# Patient Record
Sex: FEMALE | Race: WHITE | NOT HISPANIC OR LATINO | Employment: UNEMPLOYED
[De-identification: names, ages, dates, MRNs, and addresses within clinical notes are randomized per-mention and may not be internally consistent; named-entity substitution may affect disease eponyms.]

---

## 2018-06-18 ENCOUNTER — TRANSCRIBE ORDERS (OUTPATIENT)
Dept: SCHEDULING | Age: 48
End: 2018-06-18

## 2018-06-18 DIAGNOSIS — Z12.31 ENCOUNTER FOR SCREENING MAMMOGRAM FOR MALIGNANT NEOPLASM OF BREAST: Primary | ICD-10-CM

## 2018-06-25 ENCOUNTER — TRANSCRIBE ORDERS (OUTPATIENT)
Dept: RADIOLOGY | Facility: HOSPITAL | Age: 48
End: 2018-06-25

## 2018-06-25 ENCOUNTER — HOSPITAL ENCOUNTER (OUTPATIENT)
Dept: RADIOLOGY | Facility: HOSPITAL | Age: 48
Discharge: HOME | End: 2018-06-25
Attending: OBSTETRICS & GYNECOLOGY
Payer: COMMERCIAL

## 2018-06-25 DIAGNOSIS — Z12.31 ENCOUNTER FOR SCREENING MAMMOGRAM FOR MALIGNANT NEOPLASM OF BREAST: ICD-10-CM

## 2018-06-25 PROCEDURE — 77063 BREAST TOMOSYNTHESIS BI: CPT

## 2018-09-21 PROBLEM — E55.9 VITAMIN D DEFICIENCY: Status: ACTIVE | Noted: 2017-07-18

## 2018-09-21 PROBLEM — O24.419 GESTATIONAL DIABETES MELLITUS (GDM): Status: ACTIVE | Noted: 2017-07-18

## 2018-09-21 RX ORDER — ERYTHROMYCIN 5 MG/G
OINTMENT OPHTHALMIC
COMMUNITY
Start: 2017-02-21 | End: 2018-09-24 | Stop reason: ALTCHOICE

## 2018-09-24 ENCOUNTER — OFFICE VISIT (OUTPATIENT)
Dept: PRIMARY CARE | Facility: CLINIC | Age: 48
End: 2018-09-24
Attending: INTERNAL MEDICINE
Payer: COMMERCIAL

## 2018-09-24 VITALS
SYSTOLIC BLOOD PRESSURE: 120 MMHG | RESPIRATION RATE: 16 BRPM | OXYGEN SATURATION: 97 % | HEIGHT: 64 IN | DIASTOLIC BLOOD PRESSURE: 72 MMHG | HEART RATE: 80 BPM | WEIGHT: 158 LBS | BODY MASS INDEX: 26.98 KG/M2

## 2018-09-24 DIAGNOSIS — R79.82 ELEVATED C-REACTIVE PROTEIN (CRP): ICD-10-CM

## 2018-09-24 DIAGNOSIS — M25.50 ARTHRALGIA, UNSPECIFIED JOINT: ICD-10-CM

## 2018-09-24 DIAGNOSIS — Z12.11 SCREEN FOR COLON CANCER: ICD-10-CM

## 2018-09-24 DIAGNOSIS — O24.419 GESTATIONAL DIABETES MELLITUS (GDM), ANTEPARTUM, GESTATIONAL DIABETES METHOD OF CONTROL UNSPECIFIED: ICD-10-CM

## 2018-09-24 DIAGNOSIS — Z13.220 SCREENING CHOLESTEROL LEVEL: ICD-10-CM

## 2018-09-24 DIAGNOSIS — E55.9 VITAMIN D DEFICIENCY: ICD-10-CM

## 2018-09-24 DIAGNOSIS — Z00.00 PREVENTATIVE HEALTH CARE: Primary | ICD-10-CM

## 2018-09-24 DIAGNOSIS — R53.83 OTHER FATIGUE: ICD-10-CM

## 2018-09-24 PROCEDURE — 99396 PREV VISIT EST AGE 40-64: CPT | Performed by: INTERNAL MEDICINE

## 2018-09-24 ASSESSMENT — ENCOUNTER SYMPTOMS
DIAPHORESIS: 0
DYSURIA: 0
BRUISES/BLEEDS EASILY: 0
SLEEP DISTURBANCE: 0
COUGH: 0
FEVER: 0
CHILLS: 0
TREMORS: 0
BLOOD IN STOOL: 0
ARTHRALGIAS: 1

## 2018-09-24 NOTE — PATIENT INSTRUCTIONS
Problem List Items Addressed This Visit     Gestational diabetes mellitus (GDM)    Relevant Orders    Hemoglobin A1c    Vitamin D deficiency    Relevant Orders    Vitamin D 25 hydroxy    Preventative health care - Primary    Screening cholesterol level    Relevant Orders    Lipid panel    Other fatigue    Relevant Orders    CBC and differential    Comprehensive metabolic panel    Hemoglobin A1c    TSH w reflex FT4    Elevated C-reactive protein (CRP)    Arthralgia    Relevant Orders    Lyme EIA reflex WB    Screen for colon cancer    Relevant Orders    Ambulatory referral to Gastroenterology

## 2018-09-24 NOTE — PROGRESS NOTES
Subjective      Patient ID: Yany Mahoney is a 48 y.o. female.    49 y/o y/o female for annual exam  No acute issues        The following have been reviewed and updated as appropriate in this visit:  Tobacco  Allergies  Problems  Med Hx  Soc Hx      Review of Systems   Constitutional: Negative for chills, diaphoresis and fever.   HENT: Negative for hearing loss.    Eyes: Negative for visual disturbance.   Respiratory: Negative for cough.    Cardiovascular: Negative for chest pain.   Gastrointestinal: Negative for blood in stool.   Endocrine: Negative for polyuria.   Genitourinary: Negative for dysuria.   Musculoskeletal: Positive for arthralgias.   Skin: Negative for rash.   Allergic/Immunologic: Negative for food allergies.   Neurological: Negative for tremors.   Hematological: Does not bruise/bleed easily.   Psychiatric/Behavioral: Negative for sleep disturbance.       Objective     Physical Exam   Constitutional: She is oriented to person, place, and time. She appears well-developed and well-nourished.   HENT:   Head: Normocephalic and atraumatic.   Nose: Nose normal.   Mouth/Throat: Oropharynx is clear and moist.   Eyes: Conjunctivae and EOM are normal. Pupils are equal, round, and reactive to light.   Neck: Normal range of motion. Neck supple.   Cardiovascular: Normal rate, regular rhythm and normal heart sounds.    Pulmonary/Chest: Effort normal and breath sounds normal.   Abdominal: Soft. Bowel sounds are normal.   Musculoskeletal: She exhibits no edema.   Neurological: She is alert and oriented to person, place, and time.   Skin: Skin is warm. Capillary refill takes less than 2 seconds.   Psychiatric: She has a normal mood and affect. Her behavior is normal.   Nursing note and vitals reviewed.      Assessment/Plan   Problem List Items Addressed This Visit     Gestational diabetes mellitus (GDM)    Relevant Orders    Hemoglobin A1c    Vitamin D deficiency    Relevant Orders    Vitamin D 25 hydroxy     Preventative health care - Primary    Screening cholesterol level    Relevant Orders    Lipid panel    Other fatigue    Relevant Orders    CBC and differential    Comprehensive metabolic panel    Hemoglobin A1c    TSH w reflex FT4    Elevated C-reactive protein (CRP)    Arthralgia    Relevant Orders    Lyme EIA reflex WB    Screen for colon cancer    Relevant Orders    Ambulatory referral to Gastroenterology

## 2018-10-03 LAB
25(OH)D3+25(OH)D2 SERPL-MCNC: 43.4 NG/ML (ref 30–100)
ALBUMIN SERPL-MCNC: 4.3 G/DL (ref 3.5–5.5)
ALBUMIN/GLOB SERPL: 1.6 {RATIO} (ref 1.2–2.2)
ALP SERPL-CCNC: 67 IU/L (ref 39–117)
ALT SERPL-CCNC: 16 IU/L (ref 0–32)
AST SERPL-CCNC: 15 IU/L (ref 0–40)
B BURGDOR IGG+IGM SER-ACNC: <0.91 ISR (ref 0–0.9)
B BURGDOR IGM SER IA-ACNC: <0.8 INDEX (ref 0–0.79)
BASOPHILS # BLD AUTO: 0 X10E3/UL (ref 0–0.2)
BASOPHILS NFR BLD AUTO: 0 %
BILIRUB SERPL-MCNC: 0.5 MG/DL (ref 0–1.2)
BUN SERPL-MCNC: 12 MG/DL (ref 6–24)
BUN/CREAT SERPL: 15 (ref 9–23)
CALCIUM SERPL-MCNC: 8.8 MG/DL (ref 8.7–10.2)
CHLORIDE SERPL-SCNC: 103 MMOL/L (ref 96–106)
CHOLEST SERPL-MCNC: 186 MG/DL (ref 100–199)
CO2 SERPL-SCNC: 23 MMOL/L (ref 20–29)
CREAT SERPL-MCNC: 0.82 MG/DL (ref 0.57–1)
CRP SERPL-MCNC: 3.2 MG/L (ref 0–4.9)
EOSINOPHIL # BLD AUTO: 0.1 X10E3/UL (ref 0–0.4)
EOSINOPHIL NFR BLD AUTO: 1 %
ERYTHROCYTE [DISTWIDTH] IN BLOOD BY AUTOMATED COUNT: 13.6 % (ref 12.3–15.4)
GLOBULIN SER CALC-MCNC: 2.7 G/DL (ref 1.5–4.5)
GLUCOSE SERPL-MCNC: 101 MG/DL (ref 65–99)
HBA1C MFR BLD: 5 % (ref 4.8–5.6)
HCT VFR BLD AUTO: 42.4 % (ref 34–46.6)
HDLC SERPL-MCNC: 92 MG/DL
HGB BLD-MCNC: 14.4 G/DL (ref 11.1–15.9)
IMM GRANULOCYTES # BLD: 0 X10E3/UL (ref 0–0.1)
IMM GRANULOCYTES NFR BLD: 0 %
LAB CORP EGFR IF AFRICN AM: 96 ML/MIN/1.73
LAB CORP EGFR IF NONAFRICN AM: 84 ML/MIN/1.73
LDLC SERPL CALC-MCNC: 83 MG/DL (ref 0–99)
LYMPHOCYTES # BLD AUTO: 2.3 X10E3/UL (ref 0.7–3.1)
LYMPHOCYTES NFR BLD AUTO: 36 %
MCH RBC QN AUTO: 29.4 PG (ref 26.6–33)
MCHC RBC AUTO-ENTMCNC: 34 G/DL (ref 31.5–35.7)
MCV RBC AUTO: 87 FL (ref 79–97)
MONOCYTES # BLD AUTO: 0.3 X10E3/UL (ref 0.1–0.9)
MONOCYTES NFR BLD AUTO: 5 %
NEUTROPHILS # BLD AUTO: 3.7 X10E3/UL (ref 1.4–7)
NEUTROPHILS NFR BLD AUTO: 58 %
PLATELET # BLD AUTO: 304 X10E3/UL (ref 150–379)
POTASSIUM SERPL-SCNC: 4.1 MMOL/L (ref 3.5–5.2)
PROT SERPL-MCNC: 7 G/DL (ref 6–8.5)
RBC # BLD AUTO: 4.9 X10E6/UL (ref 3.77–5.28)
SODIUM SERPL-SCNC: 141 MMOL/L (ref 134–144)
T4 FREE SERPL-MCNC: 1.37 NG/DL (ref 0.82–1.77)
TRIGL SERPL-MCNC: 56 MG/DL (ref 0–149)
TSH SERPL DL<=0.005 MIU/L-ACNC: 1.53 UIU/ML (ref 0.45–4.5)
VLDLC SERPL CALC-MCNC: 11 MG/DL (ref 5–40)
WBC # BLD AUTO: 6.4 X10E3/UL (ref 3.4–10.8)

## 2018-10-05 ENCOUNTER — OFFICE VISIT (OUTPATIENT)
Dept: PRIMARY CARE | Facility: CLINIC | Age: 48
End: 2018-10-05
Payer: COMMERCIAL

## 2018-10-05 VITALS
RESPIRATION RATE: 16 BRPM | OXYGEN SATURATION: 98 % | SYSTOLIC BLOOD PRESSURE: 90 MMHG | DIASTOLIC BLOOD PRESSURE: 50 MMHG | HEART RATE: 88 BPM | TEMPERATURE: 98.7 F

## 2018-10-05 DIAGNOSIS — R30.0 DYSURIA: Primary | ICD-10-CM

## 2018-10-05 LAB
BACTERIA, POC: ABNORMAL
BILIRUBIN, POC: NEGATIVE
BLOOD URINE, POC: NEGATIVE
CLARITY, POC: CLEAR
COLOR, POC: YELLOW
EXPIRATION DATE: ABNORMAL
GLUCOSE URINE, POC: NEGATIVE
KETONES, POC: NEGATIVE
LEUKOCYTE EST, POC: ABNORMAL
Lab: ABNORMAL
NITRITE, POC: NEGATIVE
PH, POC: 7
POCT MANUFACTURER: ABNORMAL
PROTEIN, POC: NEGATIVE
SPECIFIC GRAVITY, POC: 1
UROBILINOGEN, POC: 0.2

## 2018-10-05 PROCEDURE — 99214 OFFICE O/P EST MOD 30 MIN: CPT | Performed by: NURSE PRACTITIONER

## 2018-10-05 PROCEDURE — 81002 URINALYSIS NONAUTO W/O SCOPE: CPT | Performed by: NURSE PRACTITIONER

## 2018-10-05 RX ORDER — MULTIVITAMIN WITH IRON
TABLET ORAL
COMMUNITY

## 2018-10-05 RX ORDER — MULTIVITAMIN
TABLET ORAL
COMMUNITY

## 2018-10-05 RX ORDER — CHOLECALCIFEROL (VITAMIN D3) 25 MCG
TABLET ORAL
COMMUNITY

## 2018-10-05 RX ORDER — TRETINOIN 0.5 MG/G
CREAM TOPICAL
COMMUNITY
Start: 2014-04-09 | End: 2020-06-19 | Stop reason: ALTCHOICE

## 2018-10-05 RX ORDER — CIPROFLOXACIN 250 MG/1
250 TABLET, FILM COATED ORAL 2 TIMES DAILY
Qty: 6 TABLET | Refills: 0 | Status: SHIPPED | OUTPATIENT
Start: 2018-10-05 | End: 2019-09-17 | Stop reason: ALTCHOICE

## 2018-10-05 RX ORDER — TRETINOIN 0.25 MG/G
1 CREAM TOPICAL DAILY
COMMUNITY
Start: 2015-04-15 | End: 2018-10-05 | Stop reason: SDUPTHER

## 2018-10-05 RX ORDER — BIOTIN 10 MG
10 TABLET ORAL DAILY
COMMUNITY

## 2018-10-05 RX ORDER — AA/PROT/LYSINE/METHIO/VIT C/B6 50-12.5 MG
10 TABLET ORAL DAILY
COMMUNITY

## 2018-10-05 RX ORDER — KETOCONAZOLE 20 MG/G
1 CREAM TOPICAL
COMMUNITY
Start: 2015-04-15 | End: 2018-10-05 | Stop reason: ALTCHOICE

## 2018-10-05 ASSESSMENT — ENCOUNTER SYMPTOMS
VOMITING: 0
CARDIOVASCULAR NEGATIVE: 1
NAUSEA: 0
DIZZINESS: 0
DYSURIA: 1
BACK PAIN: 1
RESPIRATORY NEGATIVE: 1
DIFFICULTY URINATING: 1
FEVER: 0
HEADACHES: 0
ABDOMINAL PAIN: 0
SHORTNESS OF BREATH: 0
HEMATURIA: 1

## 2018-10-05 NOTE — ASSESSMENT & PLAN NOTE
Increase water intake to 64 ounces daily    Decrease tea    Fill Cipro Rx if no improvement after increase in fluids    Add AZO uristat if needed for burning or discomfort

## 2018-10-05 NOTE — PATIENT INSTRUCTIONS
Dysuria  Increase water intake to 64 ounces daily    Decrease tea    Fill Cipro Rx if no improvement after increase in fluids    Add AZO uristat if needed for burning or discomfort

## 2018-10-05 NOTE — PROGRESS NOTES
Subjective      Patient ID: Yany Mahoney is a 48 y.o. female.  1970      For the past week the patient can feel a tingling with her first urination of the day. When she wipes she can see a pink tinge. The rest of the day she feels fine. She is due for her menstrual cycle.       UTI    This is a new problem. The current episode started in the past 7 days. The problem occurs intermittently. The problem has been unchanged. There has been no fever. She is sexually active. There is no history of pyelonephritis. Associated symptoms include hematuria. Pertinent negatives include no nausea or vomiting. She has tried nothing for the symptoms. The treatment provided no relief.       The following have been reviewed and updated as appropriate in this visit:       Review of Systems   Constitutional: Negative for fever.   Respiratory: Negative.  Negative for shortness of breath.    Cardiovascular: Negative.  Negative for chest pain.   Gastrointestinal: Negative for abdominal pain, nausea and vomiting.   Genitourinary: Positive for difficulty urinating, dysuria and hematuria.   Musculoskeletal: Positive for back pain (Chronic per patient).   Neurological: Negative for dizziness and headaches.   All other systems reviewed and are negative.      Objective     Vitals:    10/05/18 1506   BP: (!) 90/50   BP Location: Left upper arm   Patient Position: Sitting   Pulse: 88   Resp: 16   Temp: 37.1 °C (98.7 °F)   TempSrc: Oral   SpO2: 98%     There is no height or weight on file to calculate BMI.    Physical Exam   Constitutional: She is oriented to person, place, and time. She appears well-developed and well-nourished.   Cardiovascular: Normal rate, regular rhythm, normal heart sounds and intact distal pulses.    Pulmonary/Chest: Effort normal and breath sounds normal.   Abdominal: Soft. Bowel sounds are normal.   Neurological: She is oriented to person, place, and time.   Psychiatric: She has a normal mood and affect. Her  behavior is normal. Judgment and thought content normal.   Vitals reviewed.      Assessment/Plan   Problem List Items Addressed This Visit     Dysuria - Primary     Increase water intake to 64 ounces daily    Decrease tea    Fill Cipro Rx if no improvement after increase in fluids    Add AZO uristat if needed for burning or discomfort         Relevant Orders    POCT urinalysis dipstick (Completed)          Author Changed to:    Lakeisha MCMULLEN

## 2018-10-08 ENCOUNTER — TELEPHONE (OUTPATIENT)
Dept: PRIMARY CARE | Facility: CLINIC | Age: 48
End: 2018-10-08

## 2018-10-08 NOTE — TELEPHONE ENCOUNTER
Pt requesting a return call to update you on how she is feeling following her visit with you last week. # is [phone]

## 2018-10-08 NOTE — TELEPHONE ENCOUNTER
I phoned pt and discussed symptoms.  She continues with mild burning and discomfort with urination.  I advised her to fill Rx for Cipro and complete taking all pills.

## 2019-05-09 ENCOUNTER — APPOINTMENT (OUTPATIENT)
Dept: URBAN - METROPOLITAN AREA CLINIC 200 | Age: 49
Setting detail: DERMATOLOGY
End: 2019-05-13

## 2019-05-09 DIAGNOSIS — D485 NEOPLASM OF UNCERTAIN BEHAVIOR OF SKIN: ICD-10-CM

## 2019-05-09 DIAGNOSIS — Z80.8 FAMILY HISTORY OF MALIGNANT NEOPLASM OF OTHER ORGANS OR SYSTEMS: ICD-10-CM

## 2019-05-09 DIAGNOSIS — Z87.2 PERSONAL HISTORY OF DISEASES OF THE SKIN AND SUBCUTANEOUS TISSUE: ICD-10-CM

## 2019-05-09 DIAGNOSIS — D22 MELANOCYTIC NEVI: ICD-10-CM

## 2019-05-09 DIAGNOSIS — L57.0 ACTINIC KERATOSIS: ICD-10-CM

## 2019-05-09 DIAGNOSIS — L57.8 OTHER SKIN CHANGES DUE TO CHRONIC EXPOSURE TO NONIONIZING RADIATION: ICD-10-CM

## 2019-05-09 DIAGNOSIS — L91.8 OTHER HYPERTROPHIC DISORDERS OF THE SKIN: ICD-10-CM

## 2019-05-09 PROBLEM — D22.71 MELANOCYTIC NEVI OF RIGHT LOWER LIMB, INCLUDING HIP: Status: ACTIVE | Noted: 2019-05-09

## 2019-05-09 PROBLEM — D48.5 NEOPLASM OF UNCERTAIN BEHAVIOR OF SKIN: Status: ACTIVE | Noted: 2019-05-09

## 2019-05-09 PROCEDURE — OTHER BIOPSY BY SHAVE METHOD: OTHER

## 2019-05-09 PROCEDURE — OTHER MIPS QUALITY: OTHER

## 2019-05-09 PROCEDURE — OTHER OBSERVATION AND MEASURE: OTHER

## 2019-05-09 PROCEDURE — 99202 OFFICE O/P NEW SF 15 MIN: CPT | Mod: 25

## 2019-05-09 PROCEDURE — OTHER COUNSELING: OTHER

## 2019-05-09 PROCEDURE — OTHER BENIGN DESTRUCTION COSMETIC: OTHER

## 2019-05-09 PROCEDURE — OTHER LIQUID NITROGEN: OTHER

## 2019-05-09 PROCEDURE — OTHER OBSERVATION: OTHER

## 2019-05-09 PROCEDURE — 17000 DESTRUCT PREMALG LESION: CPT | Mod: 59

## 2019-05-09 PROCEDURE — OTHER PRESCRIPTION: OTHER

## 2019-05-09 PROCEDURE — 11102 TANGNTL BX SKIN SINGLE LES: CPT

## 2019-05-09 RX ORDER — TRETINOIN 0.5 MG/G
CREAM TOPICAL
Qty: 1 | Refills: 4 | Status: ERX | COMMUNITY
Start: 2019-05-09

## 2019-05-09 ASSESSMENT — LOCATION SIMPLE DESCRIPTION DERM
LOCATION SIMPLE: RIGHT UPPER ARM
LOCATION SIMPLE: RIGHT PRETIBIAL REGION
LOCATION SIMPLE: CHEST
LOCATION SIMPLE: RIGHT UPPER BACK
LOCATION SIMPLE: RIGHT UPPER ARM
LOCATION SIMPLE: LEFT ANTERIOR NECK
LOCATION SIMPLE: RIGHT UPPER BACK
LOCATION SIMPLE: RIGHT GREAT TOE
LOCATION SIMPLE: RIGHT GREAT TOE

## 2019-05-09 ASSESSMENT — LOCATION DETAILED DESCRIPTION DERM
LOCATION DETAILED: LEFT INFERIOR ANTERIOR NECK
LOCATION DETAILED: RIGHT MEDIAL GREAT TOE
LOCATION DETAILED: LEFT MEDIAL SUPERIOR CHEST
LOCATION DETAILED: RIGHT SUPERIOR UPPER BACK
LOCATION DETAILED: RIGHT DISTAL PRETIBIAL REGION
LOCATION DETAILED: RIGHT ANTERIOR DISTAL UPPER ARM
LOCATION DETAILED: RIGHT SUPERIOR UPPER BACK
LOCATION DETAILED: RIGHT MEDIAL GREAT TOE
LOCATION DETAILED: RIGHT ANTERIOR DISTAL UPPER ARM

## 2019-05-09 ASSESSMENT — LOCATION ZONE DERM
LOCATION ZONE: ARM
LOCATION ZONE: LEG
LOCATION ZONE: TRUNK
LOCATION ZONE: NECK
LOCATION ZONE: TRUNK
LOCATION ZONE: TOE
LOCATION ZONE: TOE
LOCATION ZONE: ARM

## 2019-05-09 ASSESSMENT — PAIN INTENSITY VAS: HOW INTENSE IS YOUR PAIN 0 BEING NO PAIN, 10 BEING THE MOST SEVERE PAIN POSSIBLE?: NO PAIN

## 2019-05-09 NOTE — PROCEDURE: BIOPSY BY SHAVE METHOD
Size Of Lesion In Cm: 0.4
Detail Level: Detailed
Electrodesiccation And Curettage Text: The wound bed was treated with electrodesiccation and curettage after the biopsy was performed.
Electrodesiccation Text: The wound bed was treated with electrodesiccation after the biopsy was performed.
Depth Of Biopsy: dermis
Anesthesia Volume In Cc (Will Not Render If 0): 0.5
Additional Anesthesia Volume In Cc (Will Not Render If 0): 0
Hemostasis: Drysol
Curettage Text: The wound bed was treated with curettage after the biopsy was performed.
Cryotherapy Text: The wound bed was treated with cryotherapy after the biopsy was performed.
Wound Care: Aquaphor
Consent: Written consent was obtained and risks were reviewed including but not limited to scarring, infection, bleeding, scabbing, incomplete removal, nerve damage and allergy to anesthesia.
Biopsy Method: Dermablade
Bill For Surgical Tray: no
Silver Nitrate Text: The wound bed was treated with silver nitrate after the biopsy was performed.
Type Of Destruction Used: Curettage
Post-Care Instructions: I reviewed with the patient in detail post-care instructions. Patient is to keep the biopsy site dry overnight, and then apply bacitracin twice daily until healed. Patient may apply hydrogen peroxide soaks to remove any crusting.
Was A Bandage Applied: Yes
Notification Instructions: Patient will be notified of biopsy results. However, patient instructed to call the office if not contacted within 2 weeks.
Dressing: bandage
Billing Type: Third-Party Bill
Biopsy Type: H and E

## 2019-05-09 NOTE — HPI: SKIN LESION
Has Your Skin Lesion Been Treated?: not been treated
Is This A New Presentation, Or A Follow-Up?: Sun Spot
Which Family Member (Optional)?: Sister

## 2019-05-09 NOTE — PROCEDURE: BENIGN DESTRUCTION COSMETIC
Consent: The patient's consent was obtained including but not limited to risks of crusting, scabbing, blistering, scarring, darker or lighter pigmentary change, recurrence, incomplete removal and infection.
Detail Level: Zone
Price (Use Numbers Only, No Special Characters Or $): 25.00
Anesthesia Type: 2% lidocaine with epinephrine
Anesthesia Volume In Cc: 0
Post-Care Instructions: I reviewed with the patient in detail post-care instructions. Patient is to wear sunprotection, and avoid picking at any of the treated lesions. Pt may apply Vaseline to crusted or scabbing areas.

## 2019-06-19 ENCOUNTER — TRANSCRIBE ORDERS (OUTPATIENT)
Dept: SCHEDULING | Age: 49
End: 2019-06-19

## 2019-06-19 DIAGNOSIS — Z12.31 ENCOUNTER FOR SCREENING MAMMOGRAM FOR MALIGNANT NEOPLASM OF BREAST: Primary | ICD-10-CM

## 2019-06-26 ENCOUNTER — HOSPITAL ENCOUNTER (OUTPATIENT)
Dept: RADIOLOGY | Age: 49
Discharge: HOME | End: 2019-06-26
Attending: OBSTETRICS & GYNECOLOGY
Payer: COMMERCIAL

## 2019-06-26 DIAGNOSIS — Z12.31 ENCOUNTER FOR SCREENING MAMMOGRAM FOR MALIGNANT NEOPLASM OF BREAST: ICD-10-CM

## 2019-06-26 PROCEDURE — 77067 SCR MAMMO BI INCL CAD: CPT

## 2019-09-10 ENCOUNTER — ANESTHESIA EVENT (OUTPATIENT)
Dept: ENDOSCOPY | Facility: HOSPITAL | Age: 49
End: 2019-09-10
Payer: COMMERCIAL

## 2019-09-10 ENCOUNTER — ANESTHESIA (OUTPATIENT)
Dept: ENDOSCOPY | Facility: HOSPITAL | Age: 49
End: 2019-09-10
Payer: COMMERCIAL

## 2019-09-10 ENCOUNTER — HOSPITAL ENCOUNTER (OUTPATIENT)
Facility: HOSPITAL | Age: 49
Discharge: HOME | End: 2019-09-10
Attending: INTERNAL MEDICINE | Admitting: INTERNAL MEDICINE
Payer: COMMERCIAL

## 2019-09-10 VITALS
TEMPERATURE: 97.2 F | HEART RATE: 64 BPM | HEIGHT: 64 IN | SYSTOLIC BLOOD PRESSURE: 101 MMHG | BODY MASS INDEX: 23.9 KG/M2 | OXYGEN SATURATION: 99 % | DIASTOLIC BLOOD PRESSURE: 66 MMHG | WEIGHT: 140 LBS | RESPIRATION RATE: 16 BRPM

## 2019-09-10 DIAGNOSIS — Z80.0 FAMILY HISTORY OF COLON CANCER: ICD-10-CM

## 2019-09-10 DIAGNOSIS — Z86.0100 HISTORY OF COLON POLYPS: ICD-10-CM

## 2019-09-10 LAB — B-HCG UR QL: NEGATIVE

## 2019-09-10 PROCEDURE — 81025 URINE PREGNANCY TEST: CPT | Performed by: INTERNAL MEDICINE

## 2019-09-10 PROCEDURE — 37000001 HC ANESTHESIA GENERAL: Performed by: INTERNAL MEDICINE

## 2019-09-10 PROCEDURE — 25000000 HC PHARMACY GENERAL: Performed by: INTERNAL MEDICINE

## 2019-09-10 PROCEDURE — 25800000 HC PHARMACY IV SOLUTIONS: Performed by: INTERNAL MEDICINE

## 2019-09-10 PROCEDURE — 0DBN8ZX EXCISION OF SIGMOID COLON, VIA NATURAL OR ARTIFICIAL OPENING ENDOSCOPIC, DIAGNOSTIC: ICD-10-PCS | Performed by: INTERNAL MEDICINE

## 2019-09-10 PROCEDURE — 63600000 HC DRUGS/DETAIL CODE: Mod: JW | Performed by: INTERNAL MEDICINE

## 2019-09-10 PROCEDURE — 75000011 HC COLONSCOPY BIOPSY: Performed by: INTERNAL MEDICINE

## 2019-09-10 PROCEDURE — 88305 TISSUE EXAM BY PATHOLOGIST: CPT | Performed by: INTERNAL MEDICINE

## 2019-09-10 RX ORDER — SODIUM CHLORIDE 9 MG/ML
INJECTION, SOLUTION INTRAVENOUS CONTINUOUS
Status: DISCONTINUED | OUTPATIENT
Start: 2019-09-10 | End: 2019-09-10 | Stop reason: HOSPADM

## 2019-09-10 RX ORDER — PROPOFOL 200MG/20ML
SYRINGE (ML) INTRAVENOUS AS NEEDED
Status: DISCONTINUED | OUTPATIENT
Start: 2019-09-10 | End: 2019-09-10 | Stop reason: SURG

## 2019-09-10 RX ORDER — LIDOCAINE HYDROCHLORIDE 10 MG/ML
INJECTION, SOLUTION EPIDURAL; INFILTRATION; INTRACAUDAL; PERINEURAL AS NEEDED
Status: DISCONTINUED | OUTPATIENT
Start: 2019-09-10 | End: 2019-09-10 | Stop reason: SURG

## 2019-09-10 RX ADMIN — PROPOFOL 20 MG: 10 INJECTION, EMULSION INTRAVENOUS at 10:09

## 2019-09-10 RX ADMIN — PROPOFOL 50 MG: 10 INJECTION, EMULSION INTRAVENOUS at 10:08

## 2019-09-10 RX ADMIN — SODIUM CHLORIDE: 9 INJECTION, SOLUTION INTRAVENOUS at 08:56

## 2019-09-10 RX ADMIN — SODIUM CHLORIDE: 9 INJECTION, SOLUTION INTRAVENOUS at 10:04

## 2019-09-10 RX ADMIN — PROPOFOL 20 MG: 10 INJECTION, EMULSION INTRAVENOUS at 10:22

## 2019-09-10 RX ADMIN — PROPOFOL 20 MG: 10 INJECTION, EMULSION INTRAVENOUS at 10:07

## 2019-09-10 RX ADMIN — PROPOFOL 20 MG: 10 INJECTION, EMULSION INTRAVENOUS at 10:20

## 2019-09-10 RX ADMIN — PROPOFOL 20 MG: 10 INJECTION, EMULSION INTRAVENOUS at 10:18

## 2019-09-10 RX ADMIN — PROPOFOL 20 MG: 10 INJECTION, EMULSION INTRAVENOUS at 10:16

## 2019-09-10 RX ADMIN — PROPOFOL 20 MG: 10 INJECTION, EMULSION INTRAVENOUS at 10:10

## 2019-09-10 RX ADMIN — PROPOFOL 20 MG: 10 INJECTION, EMULSION INTRAVENOUS at 10:24

## 2019-09-10 RX ADMIN — PROPOFOL 20 MG: 10 INJECTION, EMULSION INTRAVENOUS at 10:28

## 2019-09-10 RX ADMIN — LIDOCAINE HYDROCHLORIDE 5 ML: 10 INJECTION, SOLUTION EPIDURAL; INFILTRATION; INTRACAUDAL; PERINEURAL at 10:06

## 2019-09-10 RX ADMIN — PROPOFOL 20 MG: 10 INJECTION, EMULSION INTRAVENOUS at 10:11

## 2019-09-10 RX ADMIN — PROPOFOL 20 MG: 10 INJECTION, EMULSION INTRAVENOUS at 10:12

## 2019-09-10 RX ADMIN — PROPOFOL 20 MG: 10 INJECTION, EMULSION INTRAVENOUS at 10:26

## 2019-09-10 RX ADMIN — PROPOFOL 20 MG: 10 INJECTION, EMULSION INTRAVENOUS at 10:14

## 2019-09-10 ASSESSMENT — PAIN SCALES - GENERAL: PAIN_LEVEL: 0

## 2019-09-10 NOTE — H&P
"   GI Consult Note          Subjective   Yany Mahoney is a 49 y.o. female who was admitted for History of colon polyps [Z86.010]  Family history of colon cancer [Z80.0].         Past Medical History:   Diagnosis Date   • Anxiety    • Colon polyp    • Diabetes (CMS/HCC) (HCC)     only associated with pregnacy   • Elevated C-reactive protein (CRP)    • Kidney stone    • Melanoma (CMS/HCC) (HCC)     upper back between scapula   • Seasonal allergies      Past Surgical History:   Procedure Laterality Date   •  SECTION     • COLON SURGERY     • CYST REMOVAL     • MOLE REMOVAL      melanoma post back     Allergies   Allergen Reactions   • Bee Sting [Bee Venom Protein (Honey Bee)] Hives     Local reaction plus tingling in throat with small hives near bite       Home Medications:  •  biotin, Take 10 mg by mouth daily.    •  coenzyme Q10, Take 10 mg by mouth daily.    •  tretinoin, Apply to the affected area once daily before bedtime.  •  cholecalciferol (vitamin D3), Take by mouth.  •  multivitamin, Take by mouth.  •  omega-3 fatty acids-fish oil, Take by mouth.        Physical Exam    Physical Exam  /64   Pulse 73   Temp 36.7 °C (98.1 °F) (Temporal)   Resp 18   Ht 1.626 m (5' 4\")   Wt 63.5 kg (140 lb)   LMP 2019 (Exact Date)   SpO2 100%   Breastfeeding? No   BMI 24.03 kg/m²     General appearance: no distress  Head: normocephalic  Lungs: clear to auscultation anteriorly   Heart: regular rate   Abdomen: soft, non-tender; bowel sounds normal; no masses, no organomegaly  Neurologic: awake and alert and oriented        Assessment     1. Hx polyps- proceed w/ colonoscopy today.           Debbie Boles DO  9/10/2019       "

## 2019-09-10 NOTE — OR SURGEON
Pre-Procedure patient identification:  I am the primary operating surgeon/proceduralist and I have identified the patient on 09/10/19 at 9:48 AM Debbie Boles DO  Phone Number: [phone]

## 2019-09-10 NOTE — OP NOTE
_______________________________________________________________________________  Patient Name: Yany Mahoney     Procedure Date: 9/10/2019 9:48 AM  MRN: [id number]                     Account Number: [id number]  YOB: 1970              Age: 49  Gender: Female                        Note Status: Finalized  Attending MD: AVRIL MAK  _______________________________________________________________________________  Procedure:            Colonoscopy  Indications:          High risk colon cancer surveillance: Personal history  of colonic polyps  Providers:            AVRIL WILLIS (Doctor)  Referring MD:         CAMILA LIM MD  Requesting Provider:  Medicines:            See the Anesthesia note for documentation of the  administered medications  Complications:        No immediate complications.  _______________________________________________________________________________  Procedure:            After I obtained informed consent, the scope was passed  under direct vision. Throughout the procedure, the  patient's blood pressure, pulse, and oxygen saturations  were monitored continuously. The Colonoscope was  introduced through the anus and advanced to the cecum,  identified by appendiceal orifice and ileocecal valve.  The colonoscopy was performed without difficulty. The  patient tolerated the procedure well. The quality of  the bowel preparation was good.  Estimated Blood Loss: Estimated blood loss: none.  Findings:  The perianal and digital rectal examinations were normal.  A diminutive polyp was found in the sigmoid colon. The polyp was  sessile. The polyp was removed with a cold biopsy forceps. Resection and  retrieval were complete.  The exam was otherwise without abnormality on direct and retroflexion  views.  Impression:           - One diminutive polyp in the sigmoid colon, removed  with a cold biopsy forceps. Resected and retrieved.  - The examination was otherwise  normal on direct and  retroflexion views.  Recommendation:       - Discharge patient to home.  - Resume previous diet.  - Continue present medications.  - Await pathology results.  - Repeat colonoscopy in 5 years for surveillance based  on pathology results.  - Return to my office PRN.  Procedure Code(s):    --- Professional ---  34359, Colonoscopy, flexible; with biopsy, single or  multiple  Diagnosis Code(s):    --- Professional ---  Z86.010, Personal history of colonic polyps  D12.5, Benign neoplasm of sigmoid colon  CPT copyright 2018 [employer]. All rights reserved.  The codes documented in this report are preliminary and upon  review may  be revised to meet current compliance requirements.  _____________________  FELISHA OSMAN DO~JACQUI  9/10/2019 10:35:33 AM  This report has been signed electronically.  Number of Addenda: 0  Note Initiated On: 9/10/2019 9:48 AM

## 2019-09-10 NOTE — ANESTHESIA POSTPROCEDURE EVALUATION
Patient: Yany Mahoney    Procedure Summary     Date:  09/10/19 Room / Location:   GI 1 / [facility]    Anesthesia Start:  1004 Anesthesia Stop:  1044    Procedure:  Colonoscopy (N/A Anus) Diagnosis:       History of colon polyps      Family history of colon cancer      (History of Colon Polyp Z86.010)      (Family History CRC Z80.0)    Provider:  Debbie Boles DO Responsible Provider:  Frances Wolff DO    Anesthesia Type:  general ASA Status:  2          Anesthesia Type: general  PACU Vitals  9/10/2019 1031 - 9/10/2019 1044      9/10/2019 1035             BP: (!)  97/54    Temp: 36.2 °C (97.2 °F)    Pulse: 74    Resp: 16    SpO2: 100 %            Anesthesia Post Evaluation    Pain score: 0  Pain management: satisfactory to patient  Mode of pain management: IV medication  Patient location during evaluation: PACU  Patient participation: complete - patient participated  Level of consciousness: awake and alert  Cardiovascular status: acceptable  Airway Patency: adequate  Respiratory status: acceptable  Hydration status: stable  Anesthetic complications: no

## 2019-09-10 NOTE — ANESTHESIA PREPROCEDURE EVALUATION
Anesthesia ROS/MED HX    Anesthesia History - neg  Pulmonary - neg  Neuro/Psych - neg  Cardiovascular- neg  Hematological - neg  GI/Hepatic- neg  Musculoskeletal- neg  Renal Disease- neg  Endo/Other   Diabetes  Body Habitus: Normal      Past Surgical History:   Procedure Laterality Date   •  SECTION     • COLON SURGERY     • CYST REMOVAL     • MOLE REMOVAL  2019    melanoma post back       Physical Exam    Airway   Mallampati: II   TM distance: >3 FB   Neck ROM: full  Cardiovascular - normal   Rhythm: regular   Rate: normal  Pulmonary - normal   clear to auscultation  Dental - normal        Anesthesia Plan    Plan: general    Technique: total IV anesthesia     Airway: natural airway / supplemental oxygen   ASA 2  Blood Products:   Use of Blood Products Discussed: No     Consented to blood products  Anesthetic plan and risks discussed with: patient  Induction:    intravenous   Postop Plan:   Patient Disposition: phase II then home   Pain Management: IV analgesics

## 2019-09-11 LAB
CASE RPRT: NORMAL
CLINICAL INFO: NORMAL
PATH REPORT.FINAL DX SPEC: NORMAL
PATH REPORT.GROSS SPEC: NORMAL

## 2019-09-17 ENCOUNTER — OFFICE VISIT (OUTPATIENT)
Dept: PRIMARY CARE | Facility: CLINIC | Age: 49
End: 2019-09-17
Payer: COMMERCIAL

## 2019-09-17 VITALS
BODY MASS INDEX: 24.04 KG/M2 | DIASTOLIC BLOOD PRESSURE: 64 MMHG | HEIGHT: 64 IN | SYSTOLIC BLOOD PRESSURE: 102 MMHG | HEART RATE: 62 BPM | OXYGEN SATURATION: 98 % | WEIGHT: 140.8 LBS | RESPIRATION RATE: 18 BRPM | TEMPERATURE: 98 F

## 2019-09-17 DIAGNOSIS — M25.50 ARTHRALGIA, UNSPECIFIED JOINT: ICD-10-CM

## 2019-09-17 DIAGNOSIS — K63.5 HYPERPLASTIC POLYP OF DESCENDING COLON: ICD-10-CM

## 2019-09-17 DIAGNOSIS — E55.9 VITAMIN D DEFICIENCY: ICD-10-CM

## 2019-09-17 DIAGNOSIS — C43.59 MALIGNANT MELANOMA OF TORSO EXCLUDING BREAST (CMS/HCC): ICD-10-CM

## 2019-09-17 DIAGNOSIS — R53.83 OTHER FATIGUE: ICD-10-CM

## 2019-09-17 DIAGNOSIS — Z86.32 HISTORY OF GESTATIONAL DIABETES: ICD-10-CM

## 2019-09-17 DIAGNOSIS — R79.82 ELEVATED C-REACTIVE PROTEIN (CRP): ICD-10-CM

## 2019-09-17 DIAGNOSIS — Z00.00 PREVENTATIVE HEALTH CARE: Primary | ICD-10-CM

## 2019-09-17 DIAGNOSIS — Z13.220 SCREENING CHOLESTEROL LEVEL: ICD-10-CM

## 2019-09-17 PROBLEM — Z12.11 SCREEN FOR COLON CANCER: Status: RESOLVED | Noted: 2018-09-24 | Resolved: 2019-09-17

## 2019-09-17 PROBLEM — R30.0 DYSURIA: Status: RESOLVED | Noted: 2018-10-05 | Resolved: 2019-09-17

## 2019-09-17 PROCEDURE — 99396 PREV VISIT EST AGE 40-64: CPT | Performed by: INTERNAL MEDICINE

## 2019-09-17 ASSESSMENT — ENCOUNTER SYMPTOMS
TREMORS: 0
BLOOD IN STOOL: 0
ARTHRALGIAS: 1
CHILLS: 0
WHEEZING: 0
DYSURIA: 0
BRUISES/BLEEDS EASILY: 0
SLEEP DISTURBANCE: 0
FEVER: 0

## 2019-09-17 NOTE — ASSESSMENT & PLAN NOTE
Get new shingles vaccine = shingrix    Vision check every 2 - 3 years    Protect hearing    Exercise 30 minutes every day    Colonoscopy repeat 2024

## 2019-09-17 NOTE — PROGRESS NOTES
Subjective      Patient ID: Yany Mahoney is a 49 y.o. female.    Annual exam    Had colonoscopy with polyp removed    Melanoma stage 0 removed back        The following have been reviewed and updated as appropriate in this visit:  Tobacco  Allergies  Meds  Problems  Med Hx  Surg Hx  Fam Hx  Soc Hx        Review of Systems   Constitutional: Negative for chills and fever.        Planned weight loss   HENT: Negative for hearing loss.    Eyes: Negative for visual disturbance.   Respiratory: Negative for wheezing.    Cardiovascular: Negative for chest pain.   Gastrointestinal: Negative for blood in stool.   Endocrine: Negative for polyuria.   Genitourinary: Negative for dysuria.   Musculoskeletal: Positive for arthralgias.   Skin: Negative for rash.   Allergic/Immunologic: Negative for food allergies.   Neurological: Negative for tremors.   Hematological: Does not bruise/bleed easily.   Psychiatric/Behavioral: Negative for sleep disturbance.       Objective     Physical Exam   Constitutional: She is oriented to person, place, and time. No distress.   HENT:   Head: Normocephalic.   Eyes: No scleral icterus.   Neck: Neck supple.   Cardiovascular: Normal rate and regular rhythm.    Pulmonary/Chest: Effort normal and breath sounds normal.   Abdominal: Soft. Bowel sounds are normal.   Musculoskeletal: She exhibits no edema.   Neurological: She is alert and oriented to person, place, and time.   Skin: Skin is warm.   Psychiatric: She has a normal mood and affect. Her behavior is normal. Judgment and thought content normal.   Nursing note and vitals reviewed.      Assessment/Plan   Problem List Items Addressed This Visit     History of gestational diabetes    Relevant Orders    Hemoglobin A1c    Vitamin D deficiency     Vitamin D 1000 IU twice day         Relevant Orders    Vitamin D 25 hydroxy    Preventative health care - Primary     Get new shingles vaccine = shingrix    Vision check every 2 - 3  years    Protect hearing    Exercise 30 minutes every day    Colonoscopy repeat 2024         Relevant Orders    CBC and differential    Comprehensive metabolic panel    Hemoglobin A1c    Lipid panel    TSH w reflex FT4    Vitamin D 25 hydroxy    C-reactive protein    Lyme EIA reflex WB    Screening cholesterol level    Relevant Orders    Lipid panel    Other fatigue    Relevant Orders    CBC and differential    Comprehensive metabolic panel    TSH w reflex FT4    Elevated C-reactive protein (CRP)    Relevant Orders    C-reactive protein    Arthralgia    Relevant Orders    Lyme EIA reflex WB    Malignant melanoma of torso excluding breast (CMS/HCC)     Follows with dermatology every 3 months         Hyperplastic polyp of descending colon     Colonoscopy 9/2019    Dr Boles one polyp removed      Follow up 5 years

## 2019-09-17 NOTE — PATIENT INSTRUCTIONS
Problem List Items Addressed This Visit     History of gestational diabetes    Relevant Orders    Hemoglobin A1c    Vitamin D deficiency     Vitamin D 1000 IU twice day         Relevant Orders    Vitamin D 25 hydroxy    Preventative health care - Primary     Get new shingles vaccine = shingrix    Vision check every 2 - 3 years    Protect hearing    Exercise 30 minutes every day    Colonoscopy repeat 2024         Relevant Orders    CBC and differential    Comprehensive metabolic panel    Hemoglobin A1c    Lipid panel    TSH w reflex FT4    Vitamin D 25 hydroxy    C-reactive protein    Lyme EIA reflex WB    Screening cholesterol level    Relevant Orders    Lipid panel    Other fatigue    Relevant Orders    CBC and differential    Comprehensive metabolic panel    TSH w reflex FT4    Elevated C-reactive protein (CRP)    Relevant Orders    C-reactive protein    Arthralgia    Relevant Orders    Lyme EIA reflex WB    Malignant melanoma of torso excluding breast (CMS/HCC)     Follows with dermatology every 3 months         Hyperplastic polyp of descending colon     Colonoscopy 9/2019    Dr Boles one polyp removed      Follow up 5 years

## 2019-09-19 LAB
BASOPHILS # BLD AUTO: 0 X10E3/UL (ref 0–0.2)
BASOPHILS NFR BLD AUTO: 0 %
EOSINOPHIL # BLD AUTO: 0 X10E3/UL (ref 0–0.4)
EOSINOPHIL NFR BLD AUTO: 1 %
ERYTHROCYTE [DISTWIDTH] IN BLOOD BY AUTOMATED COUNT: 13.3 % (ref 12.3–15.4)
HCT VFR BLD AUTO: 45.9 % (ref 34–46.6)
HGB BLD-MCNC: 14.9 G/DL (ref 11.1–15.9)
IMM GRANULOCYTES # BLD AUTO: 0 X10E3/UL (ref 0–0.1)
IMM GRANULOCYTES NFR BLD AUTO: 0 %
LYMPHOCYTES # BLD AUTO: 1.8 X10E3/UL (ref 0.7–3.1)
LYMPHOCYTES NFR BLD AUTO: 35 %
MCH RBC QN AUTO: 28.5 PG (ref 26.6–33)
MCHC RBC AUTO-ENTMCNC: 32.5 G/DL (ref 31.5–35.7)
MCV RBC AUTO: 88 FL (ref 79–97)
MONOCYTES # BLD AUTO: 0.2 X10E3/UL (ref 0.1–0.9)
MONOCYTES NFR BLD AUTO: 4 %
NEUTROPHILS # BLD AUTO: 3 X10E3/UL (ref 1.4–7)
NEUTROPHILS NFR BLD AUTO: 60 %
PLATELET # BLD AUTO: 237 X10E3/UL (ref 150–450)
RBC # BLD AUTO: 5.22 X10E6/UL (ref 3.77–5.28)
WBC # BLD AUTO: 5 X10E3/UL (ref 3.4–10.8)

## 2019-09-20 LAB
25(OH)D3+25(OH)D2 SERPL-MCNC: 34.6 NG/ML (ref 30–100)
ALBUMIN SERPL-MCNC: 4.3 G/DL (ref 3.5–5.5)
ALBUMIN/GLOB SERPL: 1.5 {RATIO} (ref 1.2–2.2)
ALP SERPL-CCNC: 95 IU/L (ref 39–117)
ALT SERPL-CCNC: 11 IU/L (ref 0–32)
AST SERPL-CCNC: 18 IU/L (ref 0–40)
B BURGDOR IGG+IGM SER-ACNC: <0.91 ISR (ref 0–0.9)
B BURGDOR IGM SER IA-ACNC: <0.8 INDEX (ref 0–0.79)
BILIRUB SERPL-MCNC: 0.3 MG/DL (ref 0–1.2)
BUN SERPL-MCNC: 13 MG/DL (ref 6–24)
BUN/CREAT SERPL: 18 (ref 9–23)
CALCIUM SERPL-MCNC: 9.3 MG/DL (ref 8.7–10.2)
CHLORIDE SERPL-SCNC: 103 MMOL/L (ref 96–106)
CHOLEST SERPL-MCNC: 196 MG/DL (ref 100–199)
CO2 SERPL-SCNC: 22 MMOL/L (ref 20–29)
CREAT SERPL-MCNC: 0.72 MG/DL (ref 0.57–1)
GLOBULIN SER CALC-MCNC: 2.8 G/DL (ref 1.5–4.5)
GLUCOSE SERPL-MCNC: 83 MG/DL (ref 65–99)
HBA1C MFR BLD: 5.2 % (ref 4.8–5.6)
HDLC SERPL-MCNC: 103 MG/DL
LAB CORP EGFR IF AFRICN AM: 112 ML/MIN/1.73
LAB CORP EGFR IF NONAFRICN AM: 97 ML/MIN/1.73
LDLC SERPL CALC-MCNC: 84 MG/DL (ref 0–99)
POTASSIUM SERPL-SCNC: 4.2 MMOL/L (ref 3.5–5.2)
PROT SERPL-MCNC: 7.1 G/DL (ref 6–8.5)
SODIUM SERPL-SCNC: 140 MMOL/L (ref 134–144)
T4 FREE SERPL-MCNC: 1.46 NG/DL (ref 0.82–1.77)
TRIGL SERPL-MCNC: 47 MG/DL (ref 0–149)
TSH SERPL DL<=0.005 MIU/L-ACNC: 1.85 UIU/ML (ref 0.45–4.5)
VLDLC SERPL CALC-MCNC: 9 MG/DL (ref 5–40)

## 2019-09-22 LAB — CRP SERPL-MCNC: 4 MG/L (ref 0–10)

## 2019-09-25 ENCOUNTER — TELEPHONE (OUTPATIENT)
Dept: PRIMARY CARE | Facility: CLINIC | Age: 49
End: 2019-09-25

## 2019-09-25 NOTE — TELEPHONE ENCOUNTER
----- Message from Shady Otero MD sent at 9/22/2019  9:05 PM EDT -----  M,  Pls let pt know labs all great  She will want CRP and lipids.  She is doing great bottom line  Tks,  D

## 2019-09-25 NOTE — TELEPHONE ENCOUNTER
Patient was contacted and made aware of results.  Patient will contact office with any questions or concerns.

## 2019-12-10 ENCOUNTER — APPOINTMENT (OUTPATIENT)
Dept: URBAN - METROPOLITAN AREA CLINIC 200 | Age: 49
Setting detail: DERMATOLOGY
End: 2019-12-12

## 2019-12-10 DIAGNOSIS — Z85.820 PERSONAL HISTORY OF MALIGNANT MELANOMA OF SKIN: ICD-10-CM

## 2019-12-10 DIAGNOSIS — Z12.83 ENCOUNTER FOR SCREENING FOR MALIGNANT NEOPLASM OF SKIN: ICD-10-CM

## 2019-12-10 PROCEDURE — OTHER COUNSELING: OTHER

## 2019-12-10 PROCEDURE — 99213 OFFICE O/P EST LOW 20 MIN: CPT

## 2019-12-10 PROCEDURE — OTHER REASSURANCE: OTHER

## 2019-12-10 PROCEDURE — OTHER MIPS QUALITY: OTHER

## 2019-12-10 ASSESSMENT — LOCATION DETAILED DESCRIPTION DERM
LOCATION DETAILED: RIGHT SUPERIOR MEDIAL UPPER BACK
LOCATION DETAILED: LEFT MEDIAL SUPERIOR CHEST

## 2019-12-10 ASSESSMENT — LOCATION SIMPLE DESCRIPTION DERM
LOCATION SIMPLE: CHEST
LOCATION SIMPLE: RIGHT UPPER BACK

## 2019-12-10 ASSESSMENT — LOCATION ZONE DERM: LOCATION ZONE: TRUNK

## 2019-12-10 NOTE — PROCEDURE: MIPS QUALITY
Additional Notes: Shingles SHINGRIX vaccine not administered due to patients personal or medical reasons. Patient declined to elaborate on those reasons.
Additional Notes: Patient has not received flu shot, but plans to.
Detail Level: Detailed
Quality 110: Preventive Care And Screening: Influenza Immunization: Influenza Immunization not Administered for Documented Reasons.
Quality 474: Zoster Vaccination Status: Shingrix vaccine was not administered for reasons documented by clinician (e.g. patient administered vaccine other than Shingrix, patient allergy or other medical reasons, patient declined or other patient reasons, vaccine not available or other system reasons)

## 2020-05-15 ENCOUNTER — TELEPHONE (OUTPATIENT)
Dept: PRIMARY CARE | Facility: CLINIC | Age: 50
End: 2020-05-15

## 2020-05-15 DIAGNOSIS — Z20.828 EXPOSURE TO 2019 NOVEL CORONAVIRUS: Primary | ICD-10-CM

## 2020-05-15 NOTE — TELEPHONE ENCOUNTER
"She called to acquire a lab slip for herself and her  to be tested for COVID antibodies.  She asked that the lab slip be made out to \"the best\" lab that is well equipped to do this testing.  I told her the slips will be mailed to their home.  She can be reached at - [phone].      (This message will also be put in to her , Emery's, chart).    Madeleine"

## 2020-05-28 ENCOUNTER — APPOINTMENT (OUTPATIENT)
Dept: URBAN - METROPOLITAN AREA CLINIC 200 | Age: 50
Setting detail: DERMATOLOGY
End: 2020-06-01

## 2020-05-28 ENCOUNTER — TELEPHONE (OUTPATIENT)
Dept: PRIMARY CARE | Facility: CLINIC | Age: 50
End: 2020-05-28

## 2020-05-28 DIAGNOSIS — Z85.820 PERSONAL HISTORY OF MALIGNANT MELANOMA OF SKIN: ICD-10-CM

## 2020-05-28 DIAGNOSIS — L57.8 OTHER SKIN CHANGES DUE TO CHRONIC EXPOSURE TO NONIONIZING RADIATION: ICD-10-CM

## 2020-05-28 DIAGNOSIS — L57.0 ACTINIC KERATOSIS: ICD-10-CM

## 2020-05-28 LAB — SARS-COV-2 IGG SERPL QL IA: NEGATIVE

## 2020-05-28 PROCEDURE — OTHER COUNSELING: OTHER

## 2020-05-28 PROCEDURE — 17000 DESTRUCT PREMALG LESION: CPT

## 2020-05-28 PROCEDURE — 99213 OFFICE O/P EST LOW 20 MIN: CPT | Mod: 25

## 2020-05-28 PROCEDURE — OTHER LIQUID NITROGEN: OTHER

## 2020-05-28 ASSESSMENT — LOCATION DETAILED DESCRIPTION DERM
LOCATION DETAILED: LEFT MEDIAL SUPERIOR CHEST
LOCATION DETAILED: LEFT INFERIOR CENTRAL MALAR CHEEK

## 2020-05-28 ASSESSMENT — LOCATION SIMPLE DESCRIPTION DERM
LOCATION SIMPLE: CHEST
LOCATION SIMPLE: LEFT CHEEK

## 2020-05-28 ASSESSMENT — LOCATION ZONE DERM
LOCATION ZONE: TRUNK
LOCATION ZONE: FACE

## 2020-05-28 NOTE — RESULT ENCOUNTER NOTE
Called pt  At  [phone]  LM to return call  Please try pt again tomorrow at this number  Leave in your box until pt calls back and if not send letter in  3 d to pt with results  Thank you MAYANK

## 2020-05-28 NOTE — TELEPHONE ENCOUNTER
Spoke with patient and pulled labs from [facility].  Went over labs with patient but advised to contact OB for advice

## 2020-05-28 NOTE — TELEPHONE ENCOUNTER
She called the office back and she said she already knows the COVID testing was negative, but she is inquiring about the results of her hormone levels.  I do not see any such testing.    She can be reached at - [phone].      Madeleine

## 2020-05-28 NOTE — PROCEDURE: LIQUID NITROGEN
Post-Care Instructions: I reviewed with the patient in detail post-care instructions. Patient is to wear sunprotection, and avoid picking at any of the treated lesions. Pt may apply Vaseline to crusted or scabbing areas.
Consent: The patient's consent was obtained including but not limited to risks of crusting, scabbing, blistering, scarring, darker or lighter pigmentary change, recurrence, incomplete removal and infection.
Detail Level: Detailed
Render Note In Bullet Format When Appropriate: No
Duration Of Freeze Thaw-Cycle (Seconds): 2
Number Of Freeze-Thaw Cycles: 1 freeze-thaw cycle

## 2020-05-29 ENCOUNTER — TELEPHONE (OUTPATIENT)
Dept: PRIMARY CARE | Facility: CLINIC | Age: 50
End: 2020-05-29

## 2020-06-18 ENCOUNTER — TELEPHONE (OUTPATIENT)
Dept: PRIMARY CARE | Facility: CLINIC | Age: 50
End: 2020-06-18

## 2020-06-18 NOTE — TELEPHONE ENCOUNTER
Yany's , Emery, called to let us know about Yany's current condition.    Over the last day, Yany has been experiencing sweats, chills, fever (over 100 degrees at first, now down to 99), and aches.    She, her , and daughter have noticed (what they all thought was sunburn) behind one of Yany's knees.  Behind her lt knee there is a red area with a much darker red part within it.  They are wondering if this is an infection and could be the reason for the above symptoms.  The area is very hard to the touch.    Mr. Mahoney can be reached at - [phone].    Madeleine

## 2020-06-19 ENCOUNTER — OFFICE VISIT (OUTPATIENT)
Dept: PRIMARY CARE | Facility: CLINIC | Age: 50
End: 2020-06-19
Payer: COMMERCIAL

## 2020-06-19 VITALS
WEIGHT: 162 LBS | DIASTOLIC BLOOD PRESSURE: 66 MMHG | TEMPERATURE: 98.6 F | BODY MASS INDEX: 27.66 KG/M2 | HEIGHT: 64 IN | HEART RATE: 70 BPM | SYSTOLIC BLOOD PRESSURE: 122 MMHG

## 2020-06-19 DIAGNOSIS — C43.59 MALIGNANT MELANOMA OF TORSO EXCLUDING BREAST (CMS/HCC): ICD-10-CM

## 2020-06-19 DIAGNOSIS — L03.116 CELLULITIS OF LEFT LOWER EXTREMITY: Primary | ICD-10-CM

## 2020-06-19 PROBLEM — L03.90 CELLULITIS: Status: ACTIVE | Noted: 2020-06-19

## 2020-06-19 PROCEDURE — 99213 OFFICE O/P EST LOW 20 MIN: CPT | Performed by: NURSE PRACTITIONER

## 2020-06-19 RX ORDER — CEPHALEXIN 500 MG/1
500 CAPSULE ORAL 4 TIMES DAILY
Qty: 28 CAPSULE | Refills: 0 | Status: SHIPPED | OUTPATIENT
Start: 2020-06-19 | End: 2020-06-22 | Stop reason: ALTCHOICE

## 2020-06-19 ASSESSMENT — ENCOUNTER SYMPTOMS
WHEEZING: 0
MYALGIAS: 1
SORE THROAT: 0
FEVER: 1
JOINT SWELLING: 0
FACIAL ASYMMETRY: 0
PALPITATIONS: 0
WEAKNESS: 0
SPEECH DIFFICULTY: 0
HEADACHES: 1
DIARRHEA: 0
DIAPHORESIS: 1
TREMORS: 0
CHILLS: 1
FATIGUE: 1
NAUSEA: 0
ARTHRALGIAS: 0
ADENOPATHY: 0
BRUISES/BLEEDS EASILY: 0
PSYCHIATRIC NEGATIVE: 1
SHORTNESS OF BREATH: 0
DIZZINESS: 0
ABDOMINAL PAIN: 0
VOMITING: 0

## 2020-06-19 ASSESSMENT — PAIN SCALES - GENERAL: PAINLEVEL: 0-NO PAIN

## 2020-06-19 NOTE — PROGRESS NOTES
Subjective      Patient ID: Yany Mahoeny is a 49 y.o. female.  1970      Here for rash   ? insect bite on back of leg on  6/16/2020  Awoke  Sweating and nausea and cold then next am temp 100 F then mid day felt  Pressure on back of legs Redness on posterior Left knee  Had red dot   Redness  Has not increased in last  24 hours      Aggravated by pressure  And touching   Used tylenol helped fever    Has had marked increased redness and pain      PMH  Melanoma has FU with Dermatology  Dr Shalini SALDIVAR in May  2020   , allergic to bees    Tdap is 2011      Pt did not see tick no myalgia only HA and stiffness neck this am not slept well last night  Teeth hurt with HA no adenopathy  No streaking no N/V/D or weakness   HA improved with tylenol some no palpations  Able to move and joint not swollen  Skin hot and burning  Expanding redness   Had bite mari in middle  No central clearing no other rash    Rash   This is a new problem. The current episode started in the past 7 days. The problem has been gradually worsening since onset. The affected locations include the left upper leg. The rash is characterized by burning and redness. She was exposed to an insect bite/sting. Associated symptoms include fatigue and a fever. Pertinent negatives include no diarrhea, facial edema, shortness of breath, sore throat or vomiting. (HA and fever and nausea  Fever this am 100  ) Treatments tried: tylenol. The treatment provided mild relief.       The following have been reviewed and updated as appropriate in this visit:  See below  Tobacco  Allergies  Meds  Problems  Med Hx  Surg Hx  Fam Hx  Soc Hx        Review of Systems   Constitutional: Positive for chills, diaphoresis, fatigue and fever.   HENT: Negative for sore throat.    Respiratory: Negative for shortness of breath and wheezing.    Cardiovascular: Negative for chest pain, palpitations and leg swelling.   Gastrointestinal: Negative for abdominal pain, diarrhea,  nausea and vomiting.   Musculoskeletal: Positive for myalgias. Negative for arthralgias and joint swelling.   Skin: Positive for rash.   Allergic/Immunologic: Negative for immunocompromised state.   Neurological: Positive for headaches. Negative for dizziness, tremors, syncope, facial asymmetry, speech difficulty and weakness.   Hematological: Negative for adenopathy. Does not bruise/bleed easily.   Psychiatric/Behavioral: Negative.      Past Medical History:   Diagnosis Date   • Anxiety    • Colon polyp    • Diabetes (CMS/HCC)     only associated with pregnacy   • Elevated C-reactive protein (CRP)    • Kidney stone    • Melanoma (CMS/HCC)     upper back between scapula   • Seasonal allergies      Past Surgical History:   Procedure Laterality Date   •  SECTION     • COLON SURGERY     • CYST REMOVAL     • MOLE REMOVAL  2019    melanoma post back     Social History     Socioeconomic History   • Marital status:      Spouse name: Not on file   • Number of children: Not on file   • Years of education: Not on file   • Highest education level: Not on file   Occupational History   • Not on file   Social Needs   • Financial resource strain: Not on file   • Food insecurity:     Worry: Not on file     Inability: Not on file   • Transportation needs:     Medical: Not on file     Non-medical: Not on file   Tobacco Use   • Smoking status: Never Smoker   • Smokeless tobacco: Never Used   Substance and Sexual Activity   • Alcohol use: Yes     Comment: rare   • Drug use: No   • Sexual activity: Defer   Lifestyle   • Physical activity:     Days per week: Not on file     Minutes per session: Not on file   • Stress: Not on file   Relationships   • Social connections:     Talks on phone: Not on file     Gets together: Not on file     Attends Congregational service: Not on file     Active member of club or organization: Not on file     Attends meetings of clubs or organizations: Not on file     Relationship status: Not on  file   • Intimate partner violence:     Fear of current or ex partner: Not on file     Emotionally abused: Not on file     Physically abused: Not on file     Forced sexual activity: Not on file   Other Topics Concern   • Not on file   Social History Narrative          Has children       Family History   Problem Relation Age of Onset   • Breast cancer Biological Mother    • Hypertension Biological Mother    • Colon polyps Biological Mother    • Breast cancer Maternal Grandmother      Patient Active Problem List   Diagnosis   • History of gestational diabetes   • Vitamin D deficiency   • Preventative health care   • Screening cholesterol level   • Other fatigue   • Elevated C-reactive protein (CRP)   • Arthralgia   • Malignant melanoma of torso excluding breast (CMS/HCC)   • Hyperplastic polyp of descending colon   • Cellulitis     Immunization History   Administered Date(s) Administered   • Influenza, Quadrivalent 09/11/2017   • Influenza, Unspecified 11/19/2010, 12/09/2011, 10/03/2012, 11/20/2013   • Tdap 01/01/2011     Allergies   Allergen Reactions   • Bee Sting [Bee Venom Protein (Honey Bee)] Hives     Local reaction plus tingling in throat with small hives near bite       Current Outpatient Medications:   •  biotin 10 mg tablet, Take 10 mg by mouth daily.  , Disp: , Rfl:   •  cholecalciferol, vitamin D3, 1,000 unit tablet, Take by mouth., Disp: , Rfl:   •  coenzyme Q10 (COQ10) 10 mg capsule, Take 10 mg by mouth daily.  , Disp: , Rfl:   •  multivitamin (THERAGRAN) tablet, Take by mouth., Disp: , Rfl:   •  omega-3 fatty acids-fish oil 300-1,000 mg capsule, Take by mouth., Disp: , Rfl:   •  cephalexin (KEFLEX) 500 mg capsule, Take 1 capsule (500 mg total) by mouth 4 (four) times a day for 7 days., Disp: 28 capsule, Rfl: 0  Health Maintenance   Topic Date Due   • Pneumococcal (1 of 3 - PCV13) 08/26/1974   • HIV Screening  08/26/1981   • Cervical Cancer Screening  08/26/1989   • Zoster Vaccine (1  "of 2) 08/26/2018   • Influenza Vaccine (Season Ended) 08/01/2020   • DTaP, Tdap, and Td Vaccines (2 - Td) 01/01/2021   • Mammogram  06/26/2021   • Colonoscopy  09/10/2024   • Meningococcal ACWY  Aged Out   • HIB Vaccines  Aged Out   • IPV Vaccines  Aged Out   • HPV Vaccines  Aged Out   • Varicella Vaccines  Discontinued     Lab Results   Component Value Date    WBC 5.0 09/19/2019    HGB 14.9 09/19/2019     09/19/2019    CHOL 196 09/19/2019    TRIG 47 09/19/2019     09/19/2019    ALT 11 09/19/2019    AST 18 09/19/2019     09/19/2019    K 4.2 09/19/2019    BUN 13 09/19/2019    CREATININE 0.72 09/19/2019    TSH 1.850 09/19/2019    HGBA1C 5.2 09/19/2019     @lLASTCHEM@  Lab Results   Component Value Date    HGBA1C 5.2 09/19/2019     Lab Results   Component Value Date    TSH 1.850 09/19/2019     Lab Results   Component Value Date    CHOL 196 09/19/2019    CHOL 186 10/02/2018     Lab Results   Component Value Date     09/19/2019    HDL 92 10/02/2018     Lab Results   Component Value Date    LDLCALC 84 09/19/2019    LDLCALC 83 10/02/2018     Lab Results   Component Value Date    TRIG 47 09/19/2019    TRIG 56 10/02/2018       Objective     Vitals:    06/19/20 1133   BP: 122/66   Pulse: 70   Temp: 37 °C (98.6 °F)   TempSrc: Temporal   Weight: 73.5 kg (162 lb)   Height: 1.626 m (5' 4\")     Body mass index is 27.81 kg/m².  BP Readings from Last 3 Encounters:   06/19/20 122/66   09/17/19 102/64   09/10/19 101/66     Wt Readings from Last 3 Encounters:   06/19/20 73.5 kg (162 lb)   09/17/19 63.9 kg (140 lb 12.8 oz)   09/10/19 63.5 kg (140 lb)     Physical Exam   Constitutional: She is oriented to person, place, and time. She appears well-nourished.   HENT:   Head: Normocephalic.   Mouth/Throat: Oropharynx is clear and moist.   Eyes: Pupils are equal, round, and reactive to light. Conjunctivae and EOM are normal.   Neck: Normal range of motion. Neck supple. No thyromegaly present.   Cardiovascular: " Normal rate, regular rhythm, normal heart sounds and intact distal pulses.   Pulmonary/Chest: Effort normal.   Abdominal: Soft. Bowel sounds are normal. There is no tenderness.   Musculoskeletal: Normal range of motion. She exhibits no edema.        Right knee: Normal.        Left knee: She exhibits erythema. She exhibits normal range of motion, no swelling, no effusion and no bony tenderness. Tenderness found.        Legs:  Lymphadenopathy:     She has no cervical adenopathy.   Neurological: She is alert and oriented to person, place, and time.   Skin: Skin is warm. Capillary refill takes less than 2 seconds.    4 x  10  Cm  L knee posterior erythematous raised red warm no discharge rash with 2.5 purple mari and pinpoint bite mari in center      Psychiatric: She has a normal mood and affect. Her behavior is normal.   Vitals reviewed.      Assessment/Plan   Cellulitis  Cellulitis LLE behind knee likely from insect bite ? Spider   Has not seen tick and limited exposure    No central clearing   Has HA and fever and nausea   Will start Cephalexin  500 mg orally every 6 hours x  7 d  Keep clean and dry   Skin marked call if increase in size of redness should start to improve in  24 hours  Tylenol for fever check temp  Before each dose take  500 mg  2 orally every 8 hours max dose per day is  3000 mg   Keep hydrated   Seek FU care if increased  Red nausea vomiting temp > 101 F not improved  Worsening sx   Florastor OTC  Probiotic while on antibiotics    To FU if HA and chills not improved      Malignant melanoma of torso excluding breast (CMS/HCC)  FU with Derm Dr Bah   3 months

## 2020-06-19 NOTE — PATIENT INSTRUCTIONS
Cellulitis  Cellulitis LLE behind knee likely from insect bite   No central clearing   Has HA and fever and nausea   Will start Cephalexin  500 mg orally every 6 hours x  7 d  Keep clean and dry   Skin marked call if increase in size of redness should start to improve in  24 hours  Tylenol for fever check temp  Before each dose take  500 mg  2 orally every 8 hours max dose per day is  3000 mg   Keep hydrated   Seek FU care if increased  Red nausea vomiting temp > 101 F not improved  Worsening sx   Florastor OTC  Probiotic while on antibiotics      Malignant melanoma of torso excluding breast (CMS/HCC)  FU with Derm Dr Bah   3 months

## 2020-06-19 NOTE — ASSESSMENT & PLAN NOTE
Cellulitis LLE behind knee likely from insect bite ? Spider   Has not seen tick and limited exposure    No central clearing   Has HA and fever and nausea   Will start Cephalexin  500 mg orally every 6 hours x  7 d  Keep clean and dry   Skin marked call if increase in size of redness should start to improve in  24 hours  Tylenol for fever check temp  Before each dose take  500 mg  2 orally every 8 hours max dose per day is  3000 mg   Keep hydrated   Seek FU care if increased  Red nausea vomiting temp > 101 F not improved  Worsening sx   Florastor OTC  Probiotic while on antibiotics    To FU if HA and chills not improved

## 2020-06-20 ENCOUNTER — TELEPHONE (OUTPATIENT)
Dept: PRIMARY CARE | Facility: CLINIC | Age: 50
End: 2020-06-20

## 2020-06-20 DIAGNOSIS — L03.116 CELLULITIS OF LEFT LOWER EXTREMITY: Primary | ICD-10-CM

## 2020-06-20 NOTE — TELEPHONE ENCOUNTER
Called with a lot of ?'s   Seen yesterday for bug bite/cellulitis  Has been having night sweats and body aches  Today rash extends beyond inked margin by 1 cm  Rash less red, tender and hot compared to yesterday  Started keflex yesterday  Discussed plan- if worsening should go to er  After discussion everything about rash is better except that it slightly has extended beyond ink margin- as such she will monitor- if worsening from this point though she will go to er  She and  had lot of ?'s about lyme  Explained lyme disease possible- test at this stage not likely helpful.  Keflex not drug of choice for lyme but does have efficacy for lyme. Keflex does have best coverage for cellulitis   Advised continuing sweats, aches and fevers beyond today would warrant further evaluation

## 2020-06-22 ENCOUNTER — APPOINTMENT (OUTPATIENT)
Dept: URBAN - METROPOLITAN AREA CLINIC 200 | Age: 50
Setting detail: DERMATOLOGY
End: 2020-06-23

## 2020-06-22 PROBLEM — L30.9 DERMATITIS, UNSPECIFIED: Status: ACTIVE | Noted: 2020-06-22

## 2020-06-22 PROCEDURE — OTHER CONSENT FOR TELEMEDICINE VISIT OBTAINED: OTHER

## 2020-06-22 PROCEDURE — OTHER REASON FOR TELEMEDICINE VISIT: OTHER

## 2020-06-22 PROCEDURE — OTHER TELEHEALTH ASSESSMENT: OTHER

## 2020-06-22 PROCEDURE — 99212 OFFICE O/P EST SF 10 MIN: CPT | Mod: 95

## 2020-06-22 RX ORDER — DOXYCYCLINE HYCLATE 100 MG
100 TABLET ORAL 2 TIMES DAILY
Qty: 42 TABLET | Refills: 0 | Status: SHIPPED | OUTPATIENT
Start: 2020-06-22 | End: 2020-07-13

## 2020-06-22 NOTE — TELEPHONE ENCOUNTER
Called pt back  HA this am but now improved  Now with  Additional redness and swelling and inflamed temporal area  Now and has some stiffness of neck  And behind knee is increased in size but not for 24 hours  stated may be receding in some places.    Pt has no  Fever today but has been taking tylenol for HA which resolved   Pt requesting appt with Dr Otero and none available today   I called Dr Otero and he will see pt tomorrow and agreed with changing to Doxycycline  Risk benefit and SE reviewed with pt    appt tomorrow at  8 am  Should fever redness HA worsen to ER

## 2020-06-22 NOTE — HPI: RASH
How Severe Is Your Rash?: moderate
Is This A New Presentation, Or A Follow-Up?: Rash
Additional History: Dx with cellulitis on Friday by pcp. Was now prescribed doxycycline

## 2020-06-23 ENCOUNTER — OFFICE VISIT (OUTPATIENT)
Dept: PRIMARY CARE | Facility: CLINIC | Age: 50
End: 2020-06-23
Payer: COMMERCIAL

## 2020-06-23 VITALS
RESPIRATION RATE: 16 BRPM | SYSTOLIC BLOOD PRESSURE: 110 MMHG | BODY MASS INDEX: 29.19 KG/M2 | DIASTOLIC BLOOD PRESSURE: 70 MMHG | HEART RATE: 69 BPM | TEMPERATURE: 98 F | HEIGHT: 64 IN | WEIGHT: 171 LBS | OXYGEN SATURATION: 97 %

## 2020-06-23 DIAGNOSIS — A69.20 LYME DISEASE: ICD-10-CM

## 2020-06-23 DIAGNOSIS — L03.116 CELLULITIS OF LEFT LOWER EXTREMITY: Primary | ICD-10-CM

## 2020-06-23 PROCEDURE — 99213 OFFICE O/P EST LOW 20 MIN: CPT | Performed by: INTERNAL MEDICINE

## 2020-06-23 ASSESSMENT — ENCOUNTER SYMPTOMS
DIARRHEA: 0
COUGH: 0
CHILLS: 1
NECK PAIN: 1
MYALGIAS: 1
VOMITING: 0
BRUISES/BLEEDS EASILY: 0
NAUSEA: 0
FEVER: 1
TROUBLE SWALLOWING: 0
DYSURIA: 0
CONSTIPATION: 0
SLEEP DISTURBANCE: 0
DIAPHORESIS: 1

## 2020-06-23 NOTE — PROGRESS NOTES
Subjective      Patient ID: Yany Mahoney is a 49 y.o. female.    1 week  Left leg red [place]  Tuesday into Wednesday had temp 101 and fver/chills/diaphoresis    Started keflex     Had derm evaluation with dx lyme vs shingles      Right temple tenderness last week    Improved with doxy starting yesterday      The following have been reviewed and updated as appropriate in this visit:  Problems       Review of Systems   Constitutional: Positive for chills, diaphoresis and fever.   HENT: Negative for trouble swallowing.    Eyes: Negative for visual disturbance.   Respiratory: Negative for cough.    Cardiovascular: Negative for chest pain.   Gastrointestinal: Negative for constipation, diarrhea, nausea and vomiting.   Genitourinary: Negative for dysuria.   Musculoskeletal: Positive for myalgias and neck pain.   Skin: Positive for rash.   Neurological:        Right temple tenderness   Hematological: Does not bruise/bleed easily.   Psychiatric/Behavioral: Negative for sleep disturbance.       Objective     Physical Exam   Constitutional: She is oriented to person, place, and time. No distress.   HENT:   Head: Normocephalic.   Eyes: No scleral icterus.   Neck: Neck supple.   Cardiovascular: Normal rate and regular rhythm.   Pulmonary/Chest: Effort normal and breath sounds normal.   Abdominal: Soft. Bowel sounds are normal.   Neurological: She is alert and oriented to person, place, and time.   Skin: Skin is warm.   Left posterior knee 8x8 cm erythema andwarm area with posterior knee    No leg edema   Psychiatric: She has a normal mood and affect. Her behavior is normal. Judgment and thought content normal.   Nursing note and vitals reviewed.      Assessment/Plan   Problem List Items Addressed This Visit        Infectious/Inflammatory    Cellulitis - Primary     Continue doxycycline x 21         Lyme disease     Finish 21 days of lyme    Protect from sun exposure    May take tylenol and arthritis

## 2020-06-23 NOTE — PATIENT INSTRUCTIONS
Problem List Items Addressed This Visit        Infectious/Inflammatory    Cellulitis - Primary     Continue doxycycline x 21         Lyme disease     Finish 21 days of lyme    Protect from sun exposure    May take tylenol and arthritis

## 2020-07-02 ENCOUNTER — TELEPHONE (OUTPATIENT)
Dept: PRIMARY CARE | Facility: CLINIC | Age: 50
End: 2020-07-02

## 2020-07-02 DIAGNOSIS — Z23 NEED FOR SHINGLES VACCINE: Primary | ICD-10-CM

## 2020-07-02 NOTE — TELEPHONE ENCOUNTER
Pt called requesting a script for the Shingrex vaccine.  States she has been on the waiting list for a while at the pharmacy and they just called her to tell her there is one available for her.  She states they are telling her she needs the script in order to get the vaccine w/ them.  Pt also wants to know if she can get the vaccine while taking doxycycline. Please advise when script is ready and if pt can get it on the abx at main number on file.  Thanks! Yuliya

## 2020-07-03 DIAGNOSIS — Z23 NEED FOR SHINGLES VACCINE: ICD-10-CM

## 2020-08-24 ENCOUNTER — TRANSCRIBE ORDERS (OUTPATIENT)
Dept: SCHEDULING | Age: 50
End: 2020-08-24

## 2020-08-24 DIAGNOSIS — Z12.31 ENCOUNTER FOR SCREENING MAMMOGRAM FOR MALIGNANT NEOPLASM OF BREAST: Primary | ICD-10-CM

## 2020-08-25 ENCOUNTER — HOSPITAL ENCOUNTER (OUTPATIENT)
Dept: RADIOLOGY | Facility: HOSPITAL | Age: 50
Discharge: HOME | End: 2020-08-25
Attending: OBSTETRICS & GYNECOLOGY
Payer: COMMERCIAL

## 2020-08-25 DIAGNOSIS — Z12.31 ENCOUNTER FOR SCREENING MAMMOGRAM FOR MALIGNANT NEOPLASM OF BREAST: ICD-10-CM

## 2020-08-25 PROCEDURE — 77063 BREAST TOMOSYNTHESIS BI: CPT

## 2020-08-26 ENCOUNTER — TRANSCRIBE ORDERS (OUTPATIENT)
Dept: REGISTRATION | Facility: HOSPITAL | Age: 50
End: 2020-08-26

## 2020-08-26 DIAGNOSIS — R92.8 OTHER ABNORMAL AND INCONCLUSIVE FINDINGS ON DIAGNOSTIC IMAGING OF BREAST: Primary | ICD-10-CM

## 2020-08-27 ENCOUNTER — HOSPITAL ENCOUNTER (OUTPATIENT)
Dept: RADIOLOGY | Facility: HOSPITAL | Age: 50
Discharge: HOME | End: 2020-08-27
Attending: RADIOLOGY
Payer: COMMERCIAL

## 2020-08-27 DIAGNOSIS — R92.8 OTHER ABNORMAL AND INCONCLUSIVE FINDINGS ON DIAGNOSTIC IMAGING OF BREAST: ICD-10-CM

## 2020-08-27 PROCEDURE — G0279 TOMOSYNTHESIS, MAMMO: HCPCS | Mod: LT

## 2020-08-27 PROCEDURE — 76642 ULTRASOUND BREAST LIMITED: CPT | Mod: LT

## 2020-09-02 ENCOUNTER — TRANSCRIBE ORDERS (OUTPATIENT)
Dept: SCHEDULING | Age: 50
End: 2020-09-02

## 2020-09-02 DIAGNOSIS — R92.8 OTHER ABNORMAL AND INCONCLUSIVE FINDINGS ON DIAGNOSTIC IMAGING OF BREAST: Primary | ICD-10-CM

## 2020-09-03 ENCOUNTER — TELEPHONE (OUTPATIENT)
Dept: RADIOLOGY | Facility: HOSPITAL | Age: 50
End: 2020-09-03

## 2020-09-03 NOTE — TELEPHONE ENCOUNTER
Reached out to Yany as she needs an MRI after an abnormal mammo. LONG chat about her and her husbands family history and a good plan for her. Will follow up after getting her to see a breast surgeon.

## 2020-09-08 PROBLEM — R92.8 ABNORMAL MAMMOGRAM: Status: ACTIVE | Noted: 2020-09-08

## 2020-09-09 ENCOUNTER — TRANSCRIBE ORDERS (OUTPATIENT)
Dept: SCHEDULING | Age: 50
End: 2020-09-09

## 2020-09-09 ENCOUNTER — OFFICE VISIT (OUTPATIENT)
Dept: SURGERY | Facility: CLINIC | Age: 50
End: 2020-09-09
Payer: COMMERCIAL

## 2020-09-09 ENCOUNTER — TELEPHONE (OUTPATIENT)
Dept: GENETICS | Facility: HOSPITAL | Age: 50
End: 2020-09-09

## 2020-09-09 ENCOUNTER — HOSPITAL ENCOUNTER (OUTPATIENT)
Dept: RADIOLOGY | Facility: HOSPITAL | Age: 50
Discharge: HOME | End: 2020-09-09
Attending: OBSTETRICS & GYNECOLOGY
Payer: COMMERCIAL

## 2020-09-09 VITALS
SYSTOLIC BLOOD PRESSURE: 112 MMHG | DIASTOLIC BLOOD PRESSURE: 68 MMHG | RESPIRATION RATE: 14 BRPM | OXYGEN SATURATION: 98 % | WEIGHT: 175 LBS | HEART RATE: 80 BPM | HEIGHT: 64 IN | BODY MASS INDEX: 29.88 KG/M2

## 2020-09-09 DIAGNOSIS — N91.1 SECONDARY AMENORRHEA: Primary | ICD-10-CM

## 2020-09-09 DIAGNOSIS — N91.1 SECONDARY AMENORRHEA: ICD-10-CM

## 2020-09-09 DIAGNOSIS — Z80.3 FAMILY HISTORY OF MALIGNANT NEOPLASM OF BREAST: ICD-10-CM

## 2020-09-09 DIAGNOSIS — E34.9 ENDOCRINE DISORDER, UNSPECIFIED: ICD-10-CM

## 2020-09-09 DIAGNOSIS — R92.8 ABNORMAL MAMMOGRAM: Primary | ICD-10-CM

## 2020-09-09 PROCEDURE — 99204 OFFICE O/P NEW MOD 45 MIN: CPT | Performed by: SURGERY

## 2020-09-09 PROCEDURE — 76856 US EXAM PELVIC COMPLETE: CPT

## 2020-09-09 RX ORDER — LANOLIN ALCOHOL/MO/W.PET/CERES
400 CREAM (GRAM) TOPICAL DAILY
COMMUNITY

## 2020-09-09 RX ORDER — ZINC SULFATE 66 MG
TABLET ORAL
COMMUNITY

## 2020-09-09 NOTE — TELEPHONE ENCOUNTER
I called Ms. Yany Mahoney in regards to her request for a genetic counseling appointment. She was agreeable to an in person appointment on 9/15/20 at . I guided her to registration.

## 2020-09-09 NOTE — LETTER
2020     Shady Otero MD  [address] [facility] [address]  [facility] [address]    Patient: Yany Mahoney  YOB: 1970  Date of Visit: 2020      Dear Dr. Otero:    Thank you for referring Yany Mahoney to me for evaluation. Below are my notes for this consultation.    If you have questions, please do not hesitate to call me. I look forward to following your patient along with you.         Sincerely,        Kim Greer MD        CC: MD Vero Caban DO Carruthers, Catherine D, MD  2020  1:36 PM  Signed  Patient ID: Yany Mahoney                              : 1970    Visit Date: 2020  Referring Provider: No ref. provider found  PCP: Shady Otero MD  GYN: No care team member to display    Subjective   Chief Complaint: Breast Check (MRI and mammo eval referred by Dr. Lao)    Yany Mahoney is a 50 y.o. old female presenting today for evaluation of abnormal breast imaging, family history of breast cancer. She went for routine mammogram, which showed a possible abnormality in the deep left breast.  Repeat imaging and ultrasound showed an area possibly consistent with PASH.  Because of her family history, MRI was recommended, as well as consultation with genetics.  She reports she had genetic testing done several years ago through her gynecologist, which was negative.  Her mother also had negative genetic testing, but she is not sure exactly which genes were evaluated.  She denies lump, does notice some tenderness and fullness around her periods.    NURYS risk 10 yr 9.6%, lifetime 33.8%      Breast Imaging: CLINICAL HISTORY:   Diagnostic callback mammography  with tomosynthesis.  Questionable asymmetry in the left breast. .     COMMENT:  Mammogram: Full field and spot compression left breast digital mammography was  performed with tomosynthesis. The breast tissue is heterogeneously dense,  which  may obscure small masses. The breast density is type C.  Coiled tubular  structures consistent with vessels are seen anterior to the pectoralis muscle in  the area of interest in the CC view.  The 90 degree and MLO views are  unremarkable. There are no suspicious calcifications or indirect signs of  malignancy. Comparisons date to 31 December 2007.  Ultrasound: Real-time grayscale and color-flow Doppler evaluation of the  inferior left breast shows a very irregular 2  cm area of echogenic tissue in  the 5:30 axis approximately 8 cm from the nipple, containing small segments of  hypoechoic/anechoic tubular structures suggestive of ducts.  This is suggestive  of PASH.  However, no coiled vessels are visualized in the fatty tissue as  suggested on the mammogram.  I am not sure whether this echogenic heterogeneous  tissue corresponds to the mammographic finding.     --  IMPRESSION:  There is a mammographic finding near the 6:00 axis of the left breast which  appears to be coiled vessels and there is a sonographic finding in the 5:30 axis  of the left breast which is suggestive, but not pathognomonic, with PASH.  These  findings may or may not be the same thing.  I do not feel that a biopsy at this  point is a reasonable option.  I strongly recommend MRI for further evaluation.  Further, I suggest the patient's risk factor be calculated with the Keon Guzman  model; the patient's mother, grandmother, and great-grandmother had breast  cancer, and the patient's mother and uncle had colon cancer.  The findings were  discussed in detail with the patient.  Written results were conveyed to the  patient.     FINAL ASSESSMENT BIRADS CATEGORY 0  -  INCOMPLETE; NEED ADDITIONAL IMAGING  EVALUATION (B BRST) HETEROGENEOUS    Breast History:    OB/GYN Status    Currently Pregnant:  N/A   Last Menstrual Period:  N/A   Menstrual Status:   Having periods   LMP Comment:   N/A   Menarche Age:     Breastfeeding?  No   Lactation  "Comment:   N/A     OB History    None              Medications: has a current medication list which includes the following prescription(s): biotin, cholecalciferol (vitamin d3), coenzyme q10, magnesium oxide, multivitamin, omega-3 fatty acids-fish oil, and zinc-15.    Family History: family history includes Breast cancer in an other family member; Breast cancer (age of onset: 65) in her maternal grandmother; Breast cancer (age of onset: 66) in her biological mother; Colon polyps in her biological mother; Diabetes in her biological father; Hypertension in her biological mother; Melanoma (age of onset: 42) in her biological sister.  Past Medical History:  has a past medical history of Anxiety, Colon polyp, Diabetes (CMS/HCC), Elevated C-reactive protein (CRP), Kidney stone (), Melanoma (CMS/HCC), and Seasonal allergies.  Past Surgical History:  has a past surgical history that includes Cyst Removal;  section; Colon surgery; and Mole removal (2019).  Social History:  reports that she has never smoked. She has never used smokeless tobacco. She reports that she drinks alcohol. She reports that she does not use drugs.  Sexual History: Sexual activity questions deferred to the physician.  Allergies: is allergic to bee sting [bee venom protein (honey bee)].     Review of Systems   All other systems reviewed and are negative.    The following have been reviewed and updated as appropriate in this visit:  Tobacco  Allergies  Meds  Problems  Med Hx  Surg Hx  Fam Hx  Soc Hx          Objective   Vitals:   Visit Vitals  /68 (BP Location: Left upper arm, Patient Position: Sitting)   Pulse 80   Resp 14   Ht 1.626 m (5' 4\")   Wt 79.4 kg (175 lb)   SpO2 98%   BMI 30.04 kg/m²     Physical Exam   Constitutional: She is oriented to person, place, and time. She appears well-developed and well-nourished.   HENT:   Head: Normocephalic and atraumatic.   Eyes: Conjunctivae and EOM are normal.   Neck: Normal range of " motion. Neck supple. No thyromegaly present.   Cardiovascular: Normal rate, regular rhythm and normal heart sounds. Exam reveals no gallop and no friction rub.   No murmur heard.  Pulmonary/Chest: Effort normal and breath sounds normal. No respiratory distress. She has no wheezes. Right breast exhibits tenderness. Right breast exhibits no inverted nipple, no mass, no nipple discharge and no skin change. Left breast exhibits tenderness. Left breast exhibits no inverted nipple, no mass, no nipple discharge and no skin change. Breasts are symmetrical.   Both breasts are large and dense to palpation, diffusely tender.  No dominant masses present.   Abdominal: Soft. She exhibits no distension and no mass. There is no tenderness.   Musculoskeletal: Normal range of motion. She exhibits no edema.   Lymphadenopathy:     She has no cervical adenopathy.     She has no axillary adenopathy.   Neurological: She is alert and oriented to person, place, and time.   Skin: Skin is warm and dry.   Psychiatric: She has a normal mood and affect. Her behavior is normal.          Assessment/Plan   Problem List Items Addressed This Visit        Other    Abnormal mammogram - Primary    Relevant Orders    MRI BREAST BILATERAL WITH AND WITHOUT CONTRAST    BI DIAGNOSTIC MAMMOGRAM LEFT    ULTRASOUND BREAST LIMITED LEFT    Family history of malignant neoplasm of breast    Relevant Orders    MRI BREAST BILATERAL WITH AND WITHOUT CONTRAST    Ambulatory Referral to [facility] Genetics        Yany presents for evaluation of abnormal imaging of the left breast and family history of breast cancer.  I reviewed her imaging.  In the left breast, there is a vague area of hyperechoic tissue on ultrasound deep in the breast, likely benign.  On mammogram, there is an area of tubular-like tissue, also likely benign.  We discussed options of core needle biopsy or MRI.  I calculated her lifetime risk of breast cancer using the NURYS risk calculator to be 33.8%,  which is high risk.  I explained that with this elevated risk of breast cancer, I would recommend annual MRI as well as mammogram for high risk screening.  She will proceed with MRI now for high risk and further evaluation of the area of question on mammogram and ultrasound.  As long as that is benign, she will need a repeat mammogram and ultrasound in 6 months.  If there is any abnormality, then she will need to proceed with a biopsy.  Regarding her high risk, we also discussed the importance of healthy diet, exercise, and weight loss.  We discussed the use of Tamoxifen to reduce risk as well, as well as some of the side effects.  I recommended meeting with medical oncology if she wants to consider this further.  I also recommended meeting with the genetic counselor to discuss whether any additional genetic testing would be indicated, given the family history of breast cancer, as well as her own personal history of melanoma, and her sister's history of melanoma.  I will plan to see her in the office in 6 months.     Return in about 6 months (around 3/9/2021).    Kim Greer MD

## 2020-09-09 NOTE — PROGRESS NOTES
Patient ID: Yany Mahoney                              : 1970    Visit Date: 2020  Referring Provider: No ref. provider found  PCP: Shady Otero MD  GYN: No care team member to display    Subjective   Chief Complaint: Breast Check (MRI and mammo eval referred by Dr. Lao)    Yany Mahoney is a 50 y.o. old female presenting today for evaluation of abnormal breast imaging, family history of breast cancer. She went for routine mammogram, which showed a possible abnormality in the deep left breast.  Repeat imaging and ultrasound showed an area possibly consistent with PASH.  Because of her family history, MRI was recommended, as well as consultation with genetics.  She reports she had genetic testing done several years ago through her gynecologist, which was negative.  Her mother also had negative genetic testing, but she is not sure exactly which genes were evaluated.  She denies lump, does notice some tenderness and fullness around her periods.    NURYS risk 10 yr 9.6%, lifetime 33.8%      Breast Imaging: CLINICAL HISTORY:   Diagnostic callback mammography  with tomosynthesis.  Questionable asymmetry in the left breast. .     COMMENT:  Mammogram: Full field and spot compression left breast digital mammography was  performed with tomosynthesis. The breast tissue is heterogeneously dense, which  may obscure small masses. The breast density is type C.  Coiled tubular  structures consistent with vessels are seen anterior to the pectoralis muscle in  the area of interest in the CC view.  The 90 degree and MLO views are  unremarkable. There are no suspicious calcifications or indirect signs of  malignancy. Comparisons date to 2007.  Ultrasound: Real-time grayscale and color-flow Doppler evaluation of the  inferior left breast shows a very irregular 2  cm area of echogenic tissue in  the 5:30 axis approximately 8 cm from the nipple, containing small segments  of  hypoechoic/anechoic tubular structures suggestive of ducts.  This is suggestive  of PASH.  However, no coiled vessels are visualized in the fatty tissue as  suggested on the mammogram.  I am not sure whether this echogenic heterogeneous  tissue corresponds to the mammographic finding.     --  IMPRESSION:  There is a mammographic finding near the 6:00 axis of the left breast which  appears to be coiled vessels and there is a sonographic finding in the 5:30 axis  of the left breast which is suggestive, but not pathognomonic, with PASH.  These  findings may or may not be the same thing.  I do not feel that a biopsy at this  point is a reasonable option.  I strongly recommend MRI for further evaluation.  Further, I suggest the patient's risk factor be calculated with the Keon Broadview  model; the patient's mother, grandmother, and great-grandmother had breast  cancer, and the patient's mother and uncle had colon cancer.  The findings were  discussed in detail with the patient.  Written results were conveyed to the  patient.     FINAL ASSESSMENT BIRADS CATEGORY 0  -  INCOMPLETE; NEED ADDITIONAL IMAGING  EVALUATION (B BRST) HETEROGENEOUS    Breast History:    OB/GYN Status    Currently Pregnant:  N/A   Last Menstrual Period:  N/A   Menstrual Status:   Having periods   LMP Comment:   N/A   Menarche Age:     Breastfeeding?  No   Lactation Comment:   N/A     OB History    None              Medications: has a current medication list which includes the following prescription(s): biotin, cholecalciferol (vitamin d3), coenzyme q10, magnesium oxide, multivitamin, omega-3 fatty acids-fish oil, and zinc-15.    Family History: family history includes Breast cancer in an other family member; Breast cancer (age of onset: 65) in her maternal grandmother; Breast cancer (age of onset: 66) in her biological mother; Colon polyps in her biological mother; Diabetes in her biological father; Hypertension in her biological mother; Melanoma  "(age of onset: 42) in her biological sister.  Past Medical History:  has a past medical history of Anxiety, Colon polyp, Diabetes (CMS/HCC), Elevated C-reactive protein (CRP), Kidney stone (2000), Melanoma (CMS/HCC), and Seasonal allergies.  Past Surgical History:  has a past surgical history that includes Cyst Removal;  section; Colon surgery; and Mole removal (2019).  Social History:  reports that she has never smoked. She has never used smokeless tobacco. She reports that she drinks alcohol. She reports that she does not use drugs.  Sexual History: Sexual activity questions deferred to the physician.  Allergies: is allergic to bee sting [bee venom protein (honey bee)].     Review of Systems   All other systems reviewed and are negative.    The following have been reviewed and updated as appropriate in this visit:  Tobacco  Allergies  Meds  Problems  Med Hx  Surg Hx  Fam Hx  Soc Hx          Objective   Vitals:   Visit Vitals  /68 (BP Location: Left upper arm, Patient Position: Sitting)   Pulse 80   Resp 14   Ht 1.626 m (5' 4\")   Wt 79.4 kg (175 lb)   SpO2 98%   BMI 30.04 kg/m²     Physical Exam   Constitutional: She is oriented to person, place, and time. She appears well-developed and well-nourished.   HENT:   Head: Normocephalic and atraumatic.   Eyes: Conjunctivae and EOM are normal.   Neck: Normal range of motion. Neck supple. No thyromegaly present.   Cardiovascular: Normal rate, regular rhythm and normal heart sounds. Exam reveals no gallop and no friction rub.   No murmur heard.  Pulmonary/Chest: Effort normal and breath sounds normal. No respiratory distress. She has no wheezes. Right breast exhibits tenderness. Right breast exhibits no inverted nipple, no mass, no nipple discharge and no skin change. Left breast exhibits tenderness. Left breast exhibits no inverted nipple, no mass, no nipple discharge and no skin change. Breasts are symmetrical.   Both breasts are large and dense to " palpation, diffusely tender.  No dominant masses present.   Abdominal: Soft. She exhibits no distension and no mass. There is no tenderness.   Musculoskeletal: Normal range of motion. She exhibits no edema.   Lymphadenopathy:     She has no cervical adenopathy.     She has no axillary adenopathy.   Neurological: She is alert and oriented to person, place, and time.   Skin: Skin is warm and dry.   Psychiatric: She has a normal mood and affect. Her behavior is normal.          Assessment/Plan   Problem List Items Addressed This Visit        Other    Abnormal mammogram - Primary    Relevant Orders    MRI BREAST BILATERAL WITH AND WITHOUT CONTRAST    BI DIAGNOSTIC MAMMOGRAM LEFT    ULTRASOUND BREAST LIMITED LEFT    Family history of malignant neoplasm of breast    Relevant Orders    MRI BREAST BILATERAL WITH AND WITHOUT CONTRAST    Ambulatory Referral to [facility]        Yany presents for evaluation of abnormal imaging of the left breast and family history of breast cancer.  I reviewed her imaging.  In the left breast, there is a vague area of hyperechoic tissue on ultrasound deep in the breast, likely benign.  On mammogram, there is an area of tubular-like tissue, also likely benign.  We discussed options of core needle biopsy or MRI.  I calculated her lifetime risk of breast cancer using the NURYS risk calculator to be 33.8%, which is high risk.  I explained that with this elevated risk of breast cancer, I would recommend annual MRI as well as mammogram for high risk screening.  She will proceed with MRI now for high risk and further evaluation of the area of question on mammogram and ultrasound.  As long as that is benign, she will need a repeat mammogram and ultrasound in 6 months.  If there is any abnormality, then she will need to proceed with a biopsy.  Regarding her high risk, we also discussed the importance of healthy diet, exercise, and weight loss.  We discussed the use of Tamoxifen to reduce risk  as well, as well as some of the side effects.  I recommended meeting with medical oncology if she wants to consider this further.  I also recommended meeting with the genetic counselor to discuss whether any additional genetic testing would be indicated, given the family history of breast cancer, as well as her own personal history of melanoma, and her sister's history of melanoma.  I will plan to see her in the office in 6 months.     Return in about 6 months (around 3/9/2021).    Kim Greer MD

## 2020-09-15 ENCOUNTER — CLINICAL SUPPORT (OUTPATIENT)
Dept: GENETICS | Facility: HOSPITAL | Age: 50
End: 2020-09-15
Attending: INTERNAL MEDICINE
Payer: COMMERCIAL

## 2020-09-15 DIAGNOSIS — Z80.8 FHX: MELANOMA: ICD-10-CM

## 2020-09-15 DIAGNOSIS — Z80.3 FHX: BREAST CANCER: ICD-10-CM

## 2020-09-15 DIAGNOSIS — Z71.83 ENCOUNTER FOR NONPROCREATIVE GENETIC COUNSELING: Primary | ICD-10-CM

## 2020-09-15 DIAGNOSIS — Z80.0 FHX: CANCER OF GI TRACT: ICD-10-CM

## 2020-09-15 DIAGNOSIS — Z85.820 PERSONAL HISTORY OF MALIGNANT MELANOMA: ICD-10-CM

## 2020-09-15 PROCEDURE — 36415 COLL VENOUS BLD VENIPUNCTURE: CPT | Performed by: GENETIC COUNSELOR, MS

## 2020-09-15 PROCEDURE — 96040 HC GENETICS COUNSELING SESSIONS: CPT | Performed by: GENETIC COUNSELOR, MS

## 2020-09-15 NOTE — LETTER
09/15/20    Shady Otero MD  [address] [facility] [address]  [facility] [address]      Dear Dr. Otero,    I am writing to confirm that your patient, Yany Mahoney, received care through my office on 09/15/20. I have enclosed a summary of the care provided to Yany for your reference.    Please contact me with any questions you may have regarding the visit.    Sincerely,           Lia Saab, [facility]      CC: Vero Lao, DO

## 2020-09-15 NOTE — LETTER
09/15/20    Kim Greer MD  [address] [facility] [address]  [place] [address]      Dear Dr. Greer,    Thank you for referring your patient, Yany Mahoney, to receive care through my office. I have enclosed a summary of the care provided to Yany on 09/15/20.    Please contact me with any questions you may have regarding the visit.    Sincerely,             Lia Saab, [facility]    [address] [place] [address]  [phone]    CC: No Recipients

## 2020-09-15 NOTE — LETTER
09/25/20    Yany Mahoney  [address] [facility] [address]    Dear Ms. Mahoney,    Thank you for participating in the [facility]etics Program.  It was a pleasure working with you. Attached is documentation from our discussion(s). It will also be sent to any physicians you indicated.      You may also choose to share this documentation with your family members. Because cancer risk is based on both personal and both maternal and paternal family history factors, as well as mutation status, your relatives are recommended to review their risks and genetic testing options (if applicable) with their own healthcare providers to derive a risk-appropriate, individualized plan.      If you have any questions, concerns, or updates to your personal/family history, please contact the [facility] Program at [phone] to further review your case.    Please see below for your encounter report.    Sincerely,         Lia Saab, [facility]        Encounter Note    Indication for Appointment:  Yany Mahoney presented for genetic counseling and cancer risk assessment at [facility] due to a personal history of melanoma and family history of breast, melanoma, colon, and prostate cancer. Yany Mahoney previously underwent genetic testing in 2013 with [facility]'s Integrated BRACAnalysis Test; results of which were negative ([facility] Accession # [id number]; see below for test report). Yany Mahoney was referred by Kim Greer MD for updated genetic testing, and presented to the session with her mother.    Personal History:   Yany Mahoney is 50 y.o. female of [place], [place], and [place] descent with primary visit diagnosis of Encounter for nonprocreative genetic counseling [Z71.83].     Yany Mahoney reports that in 2018 she was diagnosed with stage 0 melanoma on her back at age 38 and is status post  "excision of the melanoma. Pathology and medical documentation are unavailable for review.    Past Medical History:   Diagnosis Date   • Anxiety    • Colon polyp 2019    1 hyperplastic polyp [id number]   • Diabetes (CMS/HCC)     only associated with pregnacy   • Elevated C-reactive protein (CRP)    • Fibrocystic breast    • Kidney stone    • Melanoma (CMS/HCC) 2018    upper back between scapula   • Screening for breast cancer     annual mammograms, Type C density   • Screening for colon cancer    • Seasonal allergies       Past Medical History Pertinent Negatives:   Denies History Of: Date Noted   • Disease of thyroid gland 09/15/2020   • Fibroid 09/15/2020     Past Surgical History:   Procedure Laterality Date   •  section     • Colon surgery     • Colonoscopy      q 5 years, most recent    • Cyst removal     • Mole removal  2019    melanoma post back     Past Surgical History Pertinent Negatives:   Denies History Of: Date Noted   • Breast biopsy 09/15/2020   • Hysterectomy 09/15/2020   • Oophorectomy 09/15/2020   • Salpingectomy 09/15/2020      Height/Weight: (previously recorded in physician's office)  Height: 1.626 m (5' 4\")  Weight: 79.4 kg (175 lb)    Gynecologic History:  Menarche Age: 14 years  Age at first live birth: 40  Menopause Status: Sheri-Menopause  Use of hormonal contraceptives: Yes (birth control 1 week)  Use of fertility medications: Yes (1 cycle age 42)  Use of hormone replacement therapy: No  Use of Tamoxifen/Evista: No    Social History     Tobacco Use   • Smoking status: Never Smoker   • Smokeless tobacco: Never Used   Substance Use Topics   • Alcohol use: Yes     Comment: rare   • Drug use: No       Family History:  See completed family history in pedigree below. Of note, Yany Mahoney's mother was present at the appointment today and provided the program with a copy of her test report from when she underwent genetic testing in  at [facility]. Yany Garcia" Denzel's mother underwent genetic testing with [facility]'s 25 gene [facility] Hereditary Cancer Panel; results of which were negative ([facility] Specimen # [id number]).    Genetic Education/Risk Assessment/Counseling:  Information was provided about the relationship between genes and cancer.  The concept of hereditary cancer was defined.  Natural history, risks and inheritance patterns of  cancer-associated genes were reviewed, as related to Yany Mahoney’s personal and/or family history.  Related psychosocial aspects were discussed.    Discussion of Genetic Testing:  The pros, cons, and limitations of testing for genetic susceptibility were discussed, including but not limited to test options, possible results, potential impact on management, and psychosocial aspects.  There may be limited data on the degree of cancer risk and/or no defined management guidelines associated with some genes.  If applicable, risk assessment models and/or published tables were used to provide a mutation probability estimate. Limitations of assessment were reviewed.    Given the reported personal and/or family history, genetic testing genetic testing was offered and accepted. The following testing was ordered:    [facility]  Common Hereditary Cancers Panel with Preliminary-Evidence Genes for Breast Cancer    Plan:  Yany Mahoney was confirmed to have understood the aforementioned information and was assisted with decision making as needed.  Informational and supportive resources were provided. Consent was obtained to share chart note(s) with physicians. Yany Mahoney is encouraged to contact the program with personal/family history updates. Yany Mahoney will be contacted via telephone when genetic test results are available.     A total of 60 minutes was spent providing genetic counseling to Yany Mahoney.

## 2020-09-15 NOTE — PROGRESS NOTES
Patient Name:  Yany Mahoney  : 1970       Indication for Appointment:  Yany Mahoney presented for genetic counseling and cancer risk assessment at [facility] due to a personal history of melanoma and family history of breast, melanoma, colon, and prostate cancer. Yany Mahoney previously underwent genetic testing in  with [facility]'s Integrated BRACAnalysis Test; results of which were negative ([facility] Accession # [id number]; see below for test report). Yany Mahoney was referred by Kim Greer MD for updated genetic testing, and presented to the session with her mother.    Personal History:   Yany Mahoney is 50 y.o. female of [place], [place], and [place] descent with primary visit diagnosis of Encounter for nonprocreative genetic counseling [Z71.83].     Yany Mahoney reports that in 2018 she was diagnosed with stage 0 melanoma on her back at age 38 and is status post excision of the melanoma. Pathology and medical documentation are unavailable for review.    Past Medical History:   Diagnosis Date   • Anxiety    • Colon polyp 2019    1 hyperplastic polyp [id number]   • Diabetes (CMS/HCC)     only associated with pregnacy   • Elevated C-reactive protein (CRP)    • Fibrocystic breast    • Kidney stone    • Melanoma (CMS/HCC) 2018    upper back between scapula   • Screening for breast cancer     annual mammograms, Type C density   • Screening for colon cancer    • Seasonal allergies       Past Medical History Pertinent Negatives:   Denies History Of: Date Noted   • Disease of thyroid gland 09/15/2020   • Fibroid 09/15/2020     Past Surgical History:   Procedure Laterality Date   •  section     • Colon surgery     • Colonoscopy      q 5 years, most recent    • Cyst removal     • Mole removal  2019    melanoma post back     Past Surgical History Pertinent Negatives:   Denies History Of: Date Noted   • Breast biopsy  "09/15/2020   • Hysterectomy 09/15/2020   • Oophorectomy 09/15/2020   • Salpingectomy 09/15/2020      Height/Weight: (previously recorded in physician's office)  Height: 1.626 m (5' 4\")  Weight: 79.4 kg (175 lb)    Gynecologic History:  Menarche Age: 14 years  Age at first live birth: 40  Menopause Status: Sheri-Menopause  Use of hormonal contraceptives: Yes (birth control 1 week)  Use of fertility medications: Yes (1 cycle age 42)  Use of hormone replacement therapy: No  Use of Tamoxifen/Evista: No    Social History     Tobacco Use   • Smoking status: Never Smoker   • Smokeless tobacco: Never Used   Substance Use Topics   • Alcohol use: Yes     Comment: rare   • Drug use: No       Family History:  See completed family history in pedigree below. Of note, Yany Mahoney's mother was present at the appointment today and provided the program with a copy of her test report from when she underwent genetic testing in 2015 at [facility]. Yany Mahoney's mother underwent genetic testing with [facility]'s 25 gene [facility] Hereditary Cancer Panel; results of which were negative ([facility] Accession # [id number]).    Genetic Education/Risk Assessment/Counseling:  Information was provided about the relationship between genes and cancer.  The concept of hereditary cancer was defined.  Natural history, risks and inheritance patterns of  cancer-associated genes were reviewed, as related to Yany Mahoney’s personal and/or family history.  Related psychosocial aspects were discussed.    Discussion of Genetic Testing:  The pros, cons, and limitations of testing for genetic susceptibility were discussed, including but not limited to test options, possible results, potential impact on management, and psychosocial aspects.  There may be limited data on the degree of cancer risk and/or no defined management guidelines associated with some genes.  If applicable, risk assessment models and/or published " tables were used to provide a mutation probability estimate. Limitations of assessment were reviewed.    Given the reported personal and/or family history, genetic testing genetic testing was offered and accepted. The following testing was ordered:    [facility]  Common Hereditary Cancers Panel with Preliminary-Evidence Genes for Breast Cancer    Plan:  Yany Mahoney was confirmed to have understood the aforementioned information and was assisted with decision making as needed.  Informational and supportive resources were provided. Consent was obtained to share chart note(s) with physicians. Yany Mahoney is encouraged to contact the program with personal/family history updates. Yany Mahoney will be contacted via telephone when genetic test results are available.     A total of 60 minutes was spent providing genetic counseling to Yany Mahoney.

## 2020-09-15 NOTE — LETTER
09/15/20    Yany Mahoney  [address] [address]    Dear Ms. Mahoney,    Thank you for participating in the [facility] Assessment [facility]etics Program.  It was a pleasure working with you. Attached is documentation from our discussion(s). It will also be sent to any physicians you indicated.      You may also choose to share this documentation with your family members. Because cancer risk is based on both personal and both maternal and paternal family history factors, as well as mutation status, your relatives are recommended to review their risks and genetic testing options (if applicable) with their own healthcare providers to derive a risk-appropriate, individualized plan.      If you have any questions, concerns, or updates to your personal/family history, please contact the [facility]etics Program at [phone] to further review your case.    Please see below for your encounter report.    Sincerely,         Lia Saab, [facility]        Encounter Note    Indication for Appointment:  Yany Mahoney presented for genetic counseling and cancer risk assessment at [facility] due to a personal history of melanoma and family history of breast, melanoma, colon, and prostate cancer. She reports that she previously underwent genetic testing several years ago ordered through her gynecologist; results of which were negative. Yany Mahoney signed a written consent form to request a copy of her previous genetic test report, which is currently in progress. Yany Mahoney was referred by Kim Greer MD for updated genetic testing, and presented to the session with her mother.    Personal History:   Yany Mahoney is 50 y.o. female of [place], [place], and [place] descent with primary visit diagnosis of Encounter for nonprocreative genetic counseling [Z71.83].     Yany Mahoney reports that in 2018 she was diagnosed with  "stage 0 melanoma on her back at age 38 and is status post excision of the melanoma. Pathology and medical documentation are unavailable for review.    Past Medical History:   Diagnosis Date   • Anxiety    • Colon polyp 2019    1 hyperplastic polyp [id number]   • Diabetes (CMS/HCC)     only associated with pregnacy   • Elevated C-reactive protein (CRP)    • Fibrocystic breast    • Kidney stone    • Melanoma (CMS/HCC) 2018    upper back between scapula   • Screening for breast cancer     annual mammograms, Type C density   • Screening for colon cancer    • Seasonal allergies       Past Medical History Pertinent Negatives:   Denies History Of: Date Noted   • Disease of thyroid gland 09/15/2020   • Fibroid 09/15/2020     Past Surgical History:   Procedure Laterality Date   •  section     • Colon surgery     • Colonoscopy      q 5 years, most recent    • Cyst removal     • Mole removal  2019    melanoma post back     Past Surgical History Pertinent Negatives:   Denies History Of: Date Noted   • Breast biopsy 09/15/2020   • Hysterectomy 09/15/2020   • Oophorectomy 09/15/2020   • Salpingectomy 09/15/2020      Height/Weight: (previously recorded in physician's office)  Height: 1.626 m (5' 4\")  Weight: 79.4 kg (175 lb)    Gynecologic History:  Menarche Age: 14 years  Age at first live birth: 40  Menopause Status: Sheri-Menopause  Use of hormonal contraceptives: Yes (birth control 1 week)  Use of fertility medications: Yes (1 cycle age 42)  Use of hormone replacement therapy: No  Use of Tamoxifen/Evista: No    Social History     Tobacco Use   • Smoking status: Never Smoker   • Smokeless tobacco: Never Used   Substance Use Topics   • Alcohol use: Yes     Comment: rare   • Drug use: No       Family History:  See completed family history in pedigree below. Of note, Yany Mahoney's mother was present at the appointment today and provided the program with a copy of her test report from when she underwent " genetic testing in 2015 at [facility]. Yany Mahoney's mother underwent genetic testing with [facility]'s 25 gene [facility] Hereditary Cancer Panel; results of which were negative ([facility] Specimen # [id number]).    Genetic Education/Risk Assessment/Counseling:  Information was provided about the relationship between genes and cancer.  The concept of hereditary cancer was defined.  Natural history, risks and inheritance patterns of  cancer-associated genes were reviewed, as related to Yany Mahoney’s personal and/or family history.  Related psychosocial aspects were discussed.    Discussion of Genetic Testing:  The pros, cons, and limitations of testing for genetic susceptibility were discussed, including but not limited to test options, possible results, potential impact on management, and psychosocial aspects.  There may be limited data on the degree of cancer risk and/or no defined management guidelines associated with some genes.  If applicable, risk assessment models and/or published tables were used to provide a mutation probability estimate. Limitations of assessment were reviewed.    Given the reported personal and/or family history, genetic testing genetic testing was offered and accepted. The following testing was ordered:    [facility]  Common Hereditary Cancers Panel with Preliminary-Evidence Genes for Breast Cancer    Plan:  Yany Mahoney was confirmed to have understood the aforementioned information and was assisted with decision making as needed.  Informational and supportive resources were provided. Consent was obtained to share chart note(s) with physicians. Yany Mahoney is encouraged to contact the program with personal/family history updates. Yany Mahoney will be contacted via telephone when genetic test results are available.     A total of 60 minutes was spent providing genetic counseling to Yany Mahoney.

## 2020-09-18 ENCOUNTER — TRANSCRIBE ORDERS (OUTPATIENT)
Dept: SCHEDULING | Age: 50
End: 2020-09-18

## 2020-09-18 DIAGNOSIS — R92.8 OTHER ABNORMAL AND INCONCLUSIVE FINDINGS ON DIAGNOSTIC IMAGING OF BREAST: Primary | ICD-10-CM

## 2020-09-22 ENCOUNTER — OFFICE VISIT (OUTPATIENT)
Dept: PRIMARY CARE | Facility: CLINIC | Age: 50
End: 2020-09-22
Payer: COMMERCIAL

## 2020-09-22 VITALS
TEMPERATURE: 97.8 F | HEART RATE: 68 BPM | DIASTOLIC BLOOD PRESSURE: 70 MMHG | BODY MASS INDEX: 30.22 KG/M2 | RESPIRATION RATE: 16 BRPM | SYSTOLIC BLOOD PRESSURE: 124 MMHG | HEIGHT: 64 IN | OXYGEN SATURATION: 98 % | WEIGHT: 177 LBS

## 2020-09-22 DIAGNOSIS — N92.6 IRREGULAR MENSES: ICD-10-CM

## 2020-09-22 DIAGNOSIS — R53.83 OTHER FATIGUE: ICD-10-CM

## 2020-09-22 DIAGNOSIS — Z80.3 FAMILY HISTORY OF MALIGNANT NEOPLASM OF BREAST: ICD-10-CM

## 2020-09-22 DIAGNOSIS — Z86.32 HISTORY OF GESTATIONAL DIABETES: ICD-10-CM

## 2020-09-22 DIAGNOSIS — Z13.220 SCREENING CHOLESTEROL LEVEL: ICD-10-CM

## 2020-09-22 DIAGNOSIS — R73.01 FASTING HYPERGLYCEMIA: ICD-10-CM

## 2020-09-22 DIAGNOSIS — Z23 NEED FOR IMMUNIZATION AGAINST INFLUENZA: ICD-10-CM

## 2020-09-22 DIAGNOSIS — Z00.00 PREVENTATIVE HEALTH CARE: Primary | ICD-10-CM

## 2020-09-22 DIAGNOSIS — R79.82 ELEVATED C-REACTIVE PROTEIN (CRP): ICD-10-CM

## 2020-09-22 DIAGNOSIS — R63.5 WEIGHT GAIN FINDING: ICD-10-CM

## 2020-09-22 DIAGNOSIS — R92.8 ABNORMAL MAMMOGRAM: ICD-10-CM

## 2020-09-22 DIAGNOSIS — M25.50 ARTHRALGIA, UNSPECIFIED JOINT: ICD-10-CM

## 2020-09-22 DIAGNOSIS — A69.20 LYME DISEASE: ICD-10-CM

## 2020-09-22 DIAGNOSIS — E55.9 VITAMIN D DEFICIENCY: ICD-10-CM

## 2020-09-22 DIAGNOSIS — C43.59 MALIGNANT MELANOMA OF TORSO EXCLUDING BREAST (CMS/HCC): ICD-10-CM

## 2020-09-22 DIAGNOSIS — K63.5 HYPERPLASTIC POLYP OF DESCENDING COLON: ICD-10-CM

## 2020-09-22 PROBLEM — L03.90 CELLULITIS: Status: RESOLVED | Noted: 2020-06-19 | Resolved: 2020-09-22

## 2020-09-22 PROCEDURE — 99396 PREV VISIT EST AGE 40-64: CPT | Mod: 25 | Performed by: INTERNAL MEDICINE

## 2020-09-22 PROCEDURE — 90686 IIV4 VACC NO PRSV 0.5 ML IM: CPT | Performed by: INTERNAL MEDICINE

## 2020-09-22 PROCEDURE — 90471 IMMUNIZATION ADMIN: CPT | Performed by: INTERNAL MEDICINE

## 2020-09-22 ASSESSMENT — ENCOUNTER SYMPTOMS
ABDOMINAL PAIN: 0
HEADACHES: 0
CONSTIPATION: 0
CHEST TIGHTNESS: 0
SLEEP DISTURBANCE: 0
HEMATOLOGIC/LYMPHATIC NEGATIVE: 1
FREQUENCY: 0
DYSURIA: 0
SHORTNESS OF BREATH: 0
DIARRHEA: 0
UNEXPECTED WEIGHT CHANGE: 1
FEVER: 0
PSYCHIATRIC NEGATIVE: 1
BACK PAIN: 0
DIFFICULTY URINATING: 0
BRUISES/BLEEDS EASILY: 0

## 2020-09-22 NOTE — ASSESSMENT & PLAN NOTE
Pt's last colonoscopy 09/10/2019 via Dr. Elvi DIAZ revealed one diminutive polyp in the sigmoid colon, resected and retrieved. Exam otherwise normal. Recommended repeat in 5 years. Next colonoscopy due 09/2024.

## 2020-09-22 NOTE — ASSESSMENT & PLAN NOTE
Exercise 30 minutes EVERY DAY! This helps by adding 9 years to life expectancy.    Consider adding yoga or juan chi for balance. To prevent falls and strengthen legs. Planking helps to strengthen core. Water aerobics are also a great alternative for exercise.    Protect hearing when engaging in loud activity like blowing the leaves or mowing the grass to prevent hearing loss. This helps to prevent dementia.     Dental cleaning every 6 months    Vision check every 2 -3 years. (Increase to yearly at age 60.)     Pt's last colonoscopy 09/10/2019 via Dr. Elvi DIAZ revealed one diminutive polyp in the sigmoid colon, resected and retrieved. Exam otherwise normal. Recommended repeat in 5 years. Next colonoscopy due 09/2024.    Recommend Flu vaccine in October 2020.

## 2020-09-22 NOTE — PROGRESS NOTES
Subjective      Patient ID: Yany Mahoney is a 50 y.o. female.  1970      HPI     Pt presents well for annual physical exam.    Pt presents well. BP great today at 124/70. Pt's weight has increased since last visit.    To have MRI on Friday after recent mammogram.Had US and Diagnostic mammogram on August 27th 2020.    Met with Genetic counselor 09/15/2020 for which germ line panel was ordered.    Pt states that after taking wrong medication 8 months ago, she has gained weight and has had a hard time losing it.    Pt was last seen in our office 06/2020 with c/o red [place] on left leg with fever, chills, and diaphoresis. Pt reported positive for chills, diaphoresis, fever, myalgias, neck pain, and rash. Pt was instructed to continue doxycycline. No orders or medication changes.    Pt's last colonoscopy 09/10/2019 via Dr. Elvi DIAZ revealed one diminutive polyp in the sigmoid colon, resected and retrieved. Exam otherwise normal. Recommended repeat in 5 years. Next colonoscopy due 09/2024.    The following have been reviewed and updated as appropriate in this visit:  Tobacco  Allergies  Meds  Med Hx  Surg Hx  Fam Hx  Soc Hx      Review of Systems   Constitutional: Positive for unexpected weight change. Negative for fever.   HENT: Negative.  Negative for congestion.    Eyes: Negative for visual disturbance.   Respiratory: Negative for chest tightness and shortness of breath.    Cardiovascular: Negative for chest pain.   Gastrointestinal: Negative for abdominal pain, constipation and diarrhea.   Endocrine: Negative for polyuria.   Genitourinary: Negative for difficulty urinating, dysuria and frequency.   Musculoskeletal: Negative for back pain.   Skin: Negative for rash.   Neurological: Negative for headaches.   Hematological: Negative.  Does not bruise/bleed easily.   Psychiatric/Behavioral: Negative.  Negative for sleep disturbance.       Objective     Vitals:    09/22/20 1519   BP: 124/70   Patient  "Position: Sitting   Pulse: 68   Resp: 16   Temp: 36.6 °C (97.8 °F)   TempSrc: Temporal   SpO2: 98%   Weight: 80.3 kg (177 lb)   Height: 1.626 m (5' 4\")     Body mass index is 30.38 kg/m².    Physical Exam  Vitals signs and nursing note reviewed.   Constitutional:       Appearance: She is well-developed.   HENT:      Head: Normocephalic and atraumatic.      Right Ear: External ear normal.      Left Ear: External ear normal.      Nose: Nose normal.   Eyes:      Pupils: Pupils are equal, round, and reactive to light.   Neck:      Musculoskeletal: Normal range of motion and neck supple.      Thyroid: No thyromegaly.   Cardiovascular:      Rate and Rhythm: Normal rate and regular rhythm.      Pulses: Normal pulses.      Heart sounds: Normal heart sounds.   Pulmonary:      Effort: Pulmonary effort is normal.      Breath sounds: Normal breath sounds. No wheezing.   Abdominal:      General: Bowel sounds are normal.      Palpations: Abdomen is soft.      Tenderness: There is no abdominal tenderness.   Musculoskeletal: Normal range of motion.         General: No tenderness.   Skin:     General: Skin is warm and dry.      Capillary Refill: Capillary refill takes less than 2 seconds.      Findings: No rash.   Neurological:      Mental Status: She is alert and oriented to person, place, and time.   Psychiatric:         Behavior: Behavior normal.         Assessment/Plan   Diagnoses and all orders for this visit:    Preventative health care (Primary)  Assessment & Plan:  Exercise 30 minutes EVERY DAY! This helps by adding 9 years to life expectancy.    Consider adding yoga or juan chi for balance. To prevent falls and strengthen legs. Planking helps to strengthen core. Water aerobics are also a great alternative for exercise.    Protect hearing when engaging in loud activity like blowing the leaves or mowing the grass to prevent hearing loss. This helps to prevent dementia.     Dental cleaning every 6 months    Vision check every 2 " -3 years. (Increase to yearly at age 60.)     Pt's last colonoscopy 09/10/2019 via Dr. Elvi DIAZ revealed one diminutive polyp in the sigmoid colon, resected and retrieved. Exam otherwise normal. Recommended repeat in 5 years. Next colonoscopy due 09/2024.    Recommend Flu vaccine in October 2020.              Hyperplastic polyp of descending colon  Assessment & Plan:  Pt's last colonoscopy 09/10/2019 via Dr. Elvi DIAZ revealed one diminutive polyp in the sigmoid colon, resected and retrieved. Exam otherwise normal. Recommended repeat in 5 years. Next colonoscopy due 09/2024.      Cellulitis of left lower extremity  Assessment & Plan:  Improved       Vitamin D deficiency    History of gestational diabetes    Screening cholesterol level    Other fatigue    Elevated C-reactive protein (CRP)    Malignant melanoma of torso excluding breast (CMS/HCC)  Assessment & Plan:  Follow up with Dr. Nova Garza Attestation  By signing my name below, I, Jayna De Leon, attest that this documentation has been prepared under the direction and in the presence of Dr. Shady Otero MD.  9/22/2020 3:47 PM    Provider Attestation  By signing my name below, I, Shady Otero, attest that this documentation has been prepared under the direction and in the presence of Shady Otero MD      9/22/2020 4:00 PM

## 2020-09-22 NOTE — PATIENT INSTRUCTIONS
Exercise 30 minutes EVERY DAY! This helps by adding 9 years to life expectancy.    Consider adding yoga or juan chi for balance. To prevent falls and strengthen legs. Planking helps to strengthen core. Water aerobics are also a great alternative for exercise.    Protect hearing when engaging in loud activity like blowing the leaves or mowing the grass to prevent hearing loss. This helps to prevent dementia.     Dental cleaning every 6 months    Vision check every 2 -3 years. (Increase to yearly at age 60.)     Pt's last colonoscopy 09/10/2019 via Dr. Elvi DIAZ revealed one diminutive polyp in the sigmoid colon, resected and retrieved. Exam otherwise normal. Recommended repeat in 5 years. Next colonoscopy due 09/2024.    Recommend Flu vaccine in October 2020.    Problem List Items Addressed This Visit        Nervous    Arthralgia     Treated lyme            Digestive    Vitamin D deficiency    Relevant Orders    Vitamin D 25 hydroxy    Hyperplastic polyp of descending colon     Pt's last colonoscopy 09/10/2019 via Dr. Elvi DIAZ revealed one diminutive polyp in the sigmoid colon, resected and retrieved. Exam otherwise normal. Recommended repeat in 5 years. Next colonoscopy due 09/2024.            Genitourinary    Irregular menses    Relevant Orders    Follicle Stimulating Hormone (FSH)    Luteinizing Hormone (LH)    Estrogens, Total Serum    Cortisol       Endocrine/Metabolic    Fasting hyperglycemia    Relevant Orders    Hemoglobin A1c    Cortisol       Infectious/Inflammatory    Lyme disease    Relevant Orders    Lyme EIA reflex WB       Other    History of gestational diabetes    Relevant Orders    Hemoglobin A1c    Preventative health care - Primary     Exercise 30 minutes EVERY DAY! This helps by adding 9 years to life expectancy.    Consider adding yoga or juan chi for balance. To prevent falls and strengthen legs. Planking helps to strengthen core. Water aerobics are also a great alternative for exercise.    Protect  hearing when engaging in loud activity like blowing the leaves or mowing the grass to prevent hearing loss. This helps to prevent dementia.     Dental cleaning every 6 months    Vision check every 2 -3 years. (Increase to yearly at age 60.)     Pt's last colonoscopy 09/10/2019 via Dr. Elvi DIAZ revealed one diminutive polyp in the sigmoid colon, resected and retrieved. Exam otherwise normal. Recommended repeat in 5 years. Next colonoscopy due 09/2024.    Recommend Flu vaccine in October 2020.                 Relevant Orders    Lyme EIA reflex WB    Hemoglobin A1c    Comprehensive metabolic panel    CBC and differential    Lipid panel    TSH w reflex FT4    Follicle Stimulating Hormone (FSH)    Luteinizing Hormone (LH)    Estrogens, Total Serum    Vitamin D 25 hydroxy    Cortisol    Screening cholesterol level    Relevant Orders    Lipid panel    Other fatigue    Relevant Orders    CBC and differential    TSH w reflex FT4    Elevated C-reactive protein (CRP)    Malignant melanoma of torso excluding breast (CMS/HCC)     Follow up with Dr. Bhatt         Abnormal mammogram     MRI and genetic tests pend         Family history of malignant neoplasm of breast     She spoke to genetic counsler         Weight gain finding    Relevant Orders    Ambulatory referral to [facility]      Other Visit Diagnoses     Need for immunization against influenza        Relevant Orders    Influenza vaccine quadrivalent preservative free 6 month and older IM                   VACCINE INFORMATION STATEMENT     Influenza (Flu) Vaccine (Inactivated or Recombinant): What you need to know     Many Vaccine Information Statements are available in Mongolian and other languages. See  www.immunize.org/vis    Hojas de información sobre vacunas están disponibles en español y en muchos otrosidiomas. Visite www.immunize.org/vis     1. Why Get Vaccinated?  Influenza vaccine can prevent influenza (flu).  Flu is a contagious  disease that spreads around the [place] every year, usually between October and May. Anyone can get the flu, but it is more dangerous for some people. Infants and young children, people 65 years of age and older, pregnant women, and people with certain health conditions or a weakened immune system are at greatest risk of flu complications.  Pneumonia, bronchitis, sinus infections and ear infections are examples of flu-related complications. If you have a medical condition, such as heart disease, cancer or diabetes, flu can make it worse.  Flu can cause fever and chills, sore throat, muscle aches, fatigue, cough, headache, and runny or stuffy nose. Some people may have vomiting and diarrhea, though this is more common in children than adults.  Each year thousands of people in the [place] die from flu, and many more are hospitalized. Flu vaccine prevents millions of illnesses and flu-related visits to the doctor each year.    2. Influenza vaccine  CDC recommends everyone 6 months of age and older get vaccinated every flu season. Children  6 months through 8 years of age may need 2 doses during a single flu season. Everyone else needs only 1 dose each flu season.  It takes about 2 weeks for protection to develop after vaccination.  There are many flu viruses, and they are always changing. Each year a new flu vaccine is made to protect against three or four viruses that are likely to cause disease in the upcoming flu season. Even when the vaccine doesn't exactly match these viruses, it may still provide some protection.  Influenza vaccine does not cause flu.  Influenza vaccine may be given at the same time as other vaccines.    3. Talk with your health care provider   Tell your vaccine provider if the person getting the vaccine:  Has had an allergic reaction after a previous dose of influenza vaccine, or has any severe, life- threatening allergies.  Has ever had Guillain-Barré Syndrome (also called GBS).  In  some cases, your health care provider may decide to postpone influenza vaccination to a future visit.  People with minor illnesses, such as a cold, may be vaccinated. People who are moderately or severely ill should usually wait until they recover before getting influenza vaccine.  Your health care provider can give you more information.    4. Risks of vaccine reaction  Soreness, redness, and swelling where shot is given, fever, muscle aches, and headache can happen after influenza vaccine.  There may be a very small increased risk of Guillain-Barré Syndrome (GBS) after inactivated influenza vaccine (the flu shot).      Young children who get the flu shot along with pneumococcal vaccine (PCV13), and/or DTaP vaccine at the same time might be slightly more likely to have a seizure caused by fever. Tell your health care provider if a child who is getting flu vaccine has ever had a seizure.  People sometimes faint after medical procedures, including vaccination. Tell your provider if you feel dizzy or have vision changes or ringing in the ears.  As with any medicine, there is a very remote chance of a vaccine causing a severe allergic reaction, other serious injury, or death.     5. What if there is a serious problem?  An allergic reaction could occur after the vaccinated person leaves the clinic. If you see signs of a  severe allergic reaction (hives, swelling of the face and throat, difficulty breathing, a fast heartbeat, dizziness, or weakness), call 9-1-1 and get the person to the nearest hospital.  For other signs that concern you, call your health care provider.  Adverse reactions should be reported to the Vaccine Adverse Event Reporting System (VAERS). Your health care provider will usually file this report, or you can do it yourself. Visit the VAERS website at www.vaers.hhs.gov or call 1-402.604.9338. VAERS is only for reporting reactions, and VAERS staff do not give medical advice.    6. The National Vaccine  Injury Compensation Program  The National Vaccine Injury Compensation Program (VICP) is a federal program that was created to compensate people who may have been injured by certain vaccines. Visit the VICP website at www.hrsa.gov/vaccinecompensation or call 1-162.123.5021 to learn about the program and about filing a claim. There is a time limit to file a claim for compensation.    7. How can I learn more?  Ask your healthcare provider.  Call your local or state health department.  Contact the Centers for Disease Control and Prevention (CDC):  - Call 1-801.757.1620 (8-419-IOO-INFO) or  - Visit CDC's www.cdc.gov/flu       U.S. Department of  Health and Human Services  Centers for Disease Control and Prevention  Vaccine Information Statement (Interim)  Inactivated Influenza Vaccine  8/15/2019  42 U.S.C. § 300aa-26

## 2020-09-23 LAB
ALBUMIN SERPL-MCNC: 4.1 G/DL (ref 3.8–4.9)
ALBUMIN/GLOB SERPL: 1.5 {RATIO} (ref 1.2–2.2)
ALP SERPL-CCNC: 85 IU/L (ref 39–117)
ALT SERPL-CCNC: 18 IU/L (ref 0–32)
AST SERPL-CCNC: 20 IU/L (ref 0–40)
BASOPHILS # BLD AUTO: 0 X10E3/UL (ref 0–0.2)
BASOPHILS NFR BLD AUTO: 1 %
BILIRUB SERPL-MCNC: 0.6 MG/DL (ref 0–1.2)
BUN SERPL-MCNC: 14 MG/DL (ref 6–24)
BUN/CREAT SERPL: 18 (ref 9–23)
CALCIUM SERPL-MCNC: 8.9 MG/DL (ref 8.7–10.2)
CHLORIDE SERPL-SCNC: 104 MMOL/L (ref 96–106)
CHOLEST SERPL-MCNC: 206 MG/DL (ref 100–199)
CO2 SERPL-SCNC: 27 MMOL/L (ref 20–29)
CREAT SERPL-MCNC: 0.78 MG/DL (ref 0.57–1)
EOSINOPHIL # BLD AUTO: 0 X10E3/UL (ref 0–0.4)
EOSINOPHIL NFR BLD AUTO: 1 %
ERYTHROCYTE [DISTWIDTH] IN BLOOD BY AUTOMATED COUNT: 12.6 % (ref 11.7–15.4)
FSH SERPL-ACNC: 19.3 MIU/ML
GLOBULIN SER CALC-MCNC: 2.7 G/DL (ref 1.5–4.5)
GLUCOSE SERPL-MCNC: 103 MG/DL (ref 65–99)
HBA1C MFR BLD: 5.4 % (ref 4.8–5.6)
HCT VFR BLD AUTO: 43.3 % (ref 34–46.6)
HDLC SERPL-MCNC: 99 MG/DL
HGB BLD-MCNC: 14 G/DL (ref 11.1–15.9)
IMM GRANULOCYTES # BLD AUTO: 0 X10E3/UL (ref 0–0.1)
IMM GRANULOCYTES NFR BLD AUTO: 0 %
LAB CORP EGFR IF AFRICN AM: 101 ML/MIN/1.73
LAB CORP EGFR IF NONAFRICN AM: 88 ML/MIN/1.73
LDLC SERPL CALC-MCNC: 98 MG/DL (ref 0–99)
LH SERPL-ACNC: 8.7 MIU/ML
LYMPHOCYTES # BLD AUTO: 1.3 X10E3/UL (ref 0.7–3.1)
LYMPHOCYTES NFR BLD AUTO: 23 %
MCH RBC QN AUTO: 27.8 PG (ref 26.6–33)
MCHC RBC AUTO-ENTMCNC: 32.3 G/DL (ref 31.5–35.7)
MCV RBC AUTO: 86 FL (ref 79–97)
MONOCYTES # BLD AUTO: 0.3 X10E3/UL (ref 0.1–0.9)
MONOCYTES NFR BLD AUTO: 6 %
NEUTROPHILS # BLD AUTO: 4.1 X10E3/UL (ref 1.4–7)
NEUTROPHILS NFR BLD AUTO: 69 %
PLATELET # BLD AUTO: 287 X10E3/UL (ref 150–450)
POTASSIUM SERPL-SCNC: 4.3 MMOL/L (ref 3.5–5.2)
PROT SERPL-MCNC: 6.8 G/DL (ref 6–8.5)
RBC # BLD AUTO: 5.03 X10E6/UL (ref 3.77–5.28)
SODIUM SERPL-SCNC: 141 MMOL/L (ref 134–144)
T4 FREE SERPL-MCNC: 1.23 NG/DL (ref 0.82–1.77)
TRIGL SERPL-MCNC: 51 MG/DL (ref 0–149)
TSH SERPL DL<=0.005 MIU/L-ACNC: 1.21 UIU/ML (ref 0.45–4.5)
VLDLC SERPL CALC-MCNC: 9 MG/DL (ref 5–40)
WBC # BLD AUTO: 5.8 X10E3/UL (ref 3.4–10.8)

## 2020-09-24 LAB
25(OH)D3+25(OH)D2 SERPL-MCNC: 28.2 NG/ML (ref 30–100)
CORTIS SERPL-MCNC: 12.1 UG/DL

## 2020-09-25 ENCOUNTER — TELEPHONE (OUTPATIENT)
Dept: GENETICS | Age: 50
End: 2020-09-25

## 2020-09-25 ENCOUNTER — HOSPITAL ENCOUNTER (OUTPATIENT)
Dept: RADIOLOGY | Facility: HOSPITAL | Age: 50
Discharge: HOME | End: 2020-09-25
Attending: OBSTETRICS & GYNECOLOGY
Payer: COMMERCIAL

## 2020-09-25 DIAGNOSIS — R92.8 OTHER ABNORMAL AND INCONCLUSIVE FINDINGS ON DIAGNOSTIC IMAGING OF BREAST: ICD-10-CM

## 2020-09-25 LAB
LYMPH SUBSET INTERP BLD FC-IMP: NORMAL
SCAN RESULT: NORMAL

## 2020-09-25 RX ORDER — GADOBUTROL 604.72 MG/ML
7.7 INJECTION INTRAVENOUS ONCE
Status: COMPLETED | OUTPATIENT
Start: 2020-09-25 | End: 2020-09-25

## 2020-09-25 RX ADMIN — GADOBUTROL 7.7 ML: 604.72 INJECTION INTRAVENOUS at 09:00

## 2020-09-25 NOTE — LETTER
09/25/20    Shady Otero MD  [address]  [place] [facility] [address]      Dear Dr. Otero,    I am writing to confirm that your patient, Yany Mahoney, received care through my office on 09/25/20. I have enclosed a summary of the care provided to Yany for your reference.    Please contact me with any questions you may have regarding the visit.    Sincerely,           Lia Saab, [facility]      CC: Vero Lao, DO

## 2020-09-25 NOTE — TELEPHONE ENCOUNTER
Patient Name:  Yany Mahoney  : 1970       Indication for Appointment:  Yany Mahoney was referred to the Cancer Risk Assessment and Genetics Program due to a personal history of melanoma and family history of breast, melanoma, colon, and prostate cancer. Yany Mahoney previously underwent genetic testing in  with [facility]'s Integrated BRACAnalysis Test; results of which were negative ([facility] Accession # [id number]). Updated genetic testing was performed.    Yany Mahoney was contacted by telephone today to discuss updated genetic test results, risk-based management guidelines and any potential additional test options. Follow up appointments to discuss the results in more detail with our medical director may be scheduled by contacting the Cancer Risk Assessment and Genetics Program.    Genetic Test Results:    RESULT:    Negative- No Pathogenic Sequence Variants Identified     LAB/TEST:  [facility]  Common Hereditary Cancers Panel with Preliminary-Evidence Genes for Breast Cancer    Genes analyzed: ABRAXAS1, AKT1, APC, SAÚL, AXIN2, BARD1, BMPR1A, BRCA1, BRCA2, BRIP1, CDH1, CDK4, CDKN2A (p14ARF), CDKN2A (o28VKY2s), CHEK2, CTNNA1, DICER1, EPCAM, FANCC, FANCM, GREM1, HOXB13, KIT, MEN1, MLH1, MRE11, MSH2, MSH3, MSH6, MUTYH, NBN, NF1, NTHL1, PALB2, PDGFRA, PIK3CA, PMS2, POLD1, POLE, PTEN, RAD50, RAD51C, RAD51D, RECQL, RINT1, SDHA, SDHB, SDHC, SDHD, SMAD4, SMARCA4, STK11, TP53, TSC1, TSC2, VHL, XRCC2    After receiving consent, the following result(s) were disclosed to Yany Mahoney:   - No reportable alterations were identified by sequencing and/or deletion/duplication analysis as interpreted by this laboratory.  This is referred to as an indeterminate negative result.  A mutation could be present that cannot be detected by the current tests performed or in a gene not tested. Additionally, biological family members may have a mutation in any of the genes tested  Yany Mahoney does not given the negative result.    Personal History:   Yany Mahoney is 50 y.o. female of [place], [place], and [place] descent.    Yany Mahoney reports that in 2018 she was diagnosed with stage 0 melanoma on her back at age 38 and is status post excision of the melanoma. Pathology and medical documentation are unavailable for review.    Past Medical History:   Diagnosis Date   • Anxiety    • Colon polyp 2019    1 hyperplastic polyp [id number]   • Diabetes (CMS/HCC)     only associated with pregnacy   • Elevated C-reactive protein (CRP)    • Fibrocystic breast    • Kidney stone    • Melanoma (CMS/HCC) 2018    upper back between scapula   • Screening for breast cancer     annual mammograms, Type C density   • Screening for colon cancer    • Seasonal allergies      Past Medical History Pertinent Negatives:   Denies History Of: Date Noted   • Disease of thyroid gland 09/15/2020   • Fibroid 09/15/2020     Past Surgical History:   Procedure Laterality Date   •  section     • Colon surgery     • Colonoscopy      q 5 years, most recent    • Cyst removal     • Mole removal  2019    melanoma post back     Past Surgical History Pertinent Negatives:   Denies History Of: Date Noted   • Breast biopsy 09/15/2020   • Hysterectomy 09/15/2020   • Oophorectomy 09/15/2020   • Salpingectomy 09/15/2020     Gynecologic History:  Menarche Age: 14 years  Age at first live birth: 40  Menopause Status: Sheri-Menopause  Use of hormonal contraceptives: Yes (birth control 1 week)  Use of fertility medications: Yes (1 cycle age 42)  Use of hormone replacement therapy: No  Use of Tamoxifen/Evista: No    Social History     Tobacco Use   • Smoking status: Never Smoker   • Smokeless tobacco: Never Used   Substance Use Topics   • Alcohol use: Yes     Comment: rare   • Drug use: No       Family History:  See completed family history in pedigree below. Of note, Yany Mahoney's mother was  present at the appointment today and provided the program with a copy of her test report from when she underwent genetic testing in 2015 at [facility]. Yany Mahoney's mother underwent genetic testing with [facility]'s 25 gene [facility] Hereditary Cancer Panel; results of which were negative ([facility] Accession # [id number]).        Risk Assessment and Management:  As a clinically actionable mutation was not identified by the current test method(s), the cancer risks and guidelines reviewed are based on the personal and/or family history provided. Of note, the reported history appears suggestive of an inherited presentation; risk management guidelines for such families in the absence of a clinically actionable mutation remain limited.  As guidelines continually change and the efficacy of screening for some cancers remains under investigation, Yany Mahoney is encouraged to review personal and family history with managing physician(s) regularly.     Breast Management:  Yany Mahoney’s lifetime risk of developing breast cancer was calculated using the Tyrer-Cuzick model and is estimated to be 36.8%. Of note, this risk may be an underestimation as all breast cancer cases in the family cannot be considered in this analysis, and again, the family history suggests an inherited presentation. Breast cancer risk is dynamic and can increase with age and other factors. A high lifetime risk for developing breast cancer is typically 20-25% or higher.    - National Comprehensive Cancer Network (NCCN) guidelines for breast cancer screening include: monthly self-breast examinations, clinical breast examination performed by a physician every 6-12 months and annual mammogram to begin 10 years prior to the youngest breast cancer diagnosis in the family, but not less than age 30. The NCCN also suggests screening with breast MRI as an adjunct to mammogram in the high risk setting, with breast MRI  screening beginning 10 years prior to the youngest breast cancer diagnosis in the family, but not less than age 25. As the lifetime risk for breast cancer exceeds 20%, screening, as well as the risks and benefits of risk reduction strategies should be discussed with managing physician(s).          Gynecologic Management:  Continue gynecologic exam annually or as directed by physician.    Gastrointestinal Management:  Yany Mahoney is advised to continue with colorectal cancer screening as directed by physician, or sooner if symptoms or changes in history warrant sooner evaluation. Based on the reported family history of colorectal cancer, Yany Mahoney’s lifetime risk of developing colorectal cancer is increased 1.5 fold over that of the general population.  General population risk is approximately 5-6%; thus, Yany Mahoney’s risk is 7.5-9%. This may be an underestimation of risk as it does not take into account the Yany Mahoney's personal history of colorectal polyps. The patient was encouraged to review this history and risk with managing gastroenterologist to determine frequency of colonoscopy.    Dermatologic Management:  Yany Mahoney is encouraged to practice skin protective behaviors, such as limiting direct sun exposure, using sunscreen, and pursuing skin screening as directed by managing physician.    General Management:  - Annual physical examination is encouraged.  - Adherence to a healthy lifestyle, including body mass index (BMI) <25 obtained through balanced diet and exercise, limiting intake of alcoholic beverages to less than 1 drink per day (serving equals 1 ounce of liquor, 6 ounces of wine or 8 ounces of beer) and not smoking.    Plan:  Cancer risks are based on personal history, as well as on maternal and paternal family histories, mutation status, and other factors.  Relatives are encouraged to consider risk assessment and/or genetic evaluation to derive a  risk-appropriate, individualized plan.  Should family member(s) be interested in genetic evaluation, the Cancer Risk Assessment and Genetics Program can provide consultation or help to find a genetic counselor in their area.    The information provided reflects current practice guidelines and may change with new medical discoveries/technology/updated personal or family history information.  Yany Mahoney was confirmed to have understood the aforementioned information and was assisted with decision making as needed.  Informational and supportive resources were provided.  Potential psychosocial ramifications related to test results were reviewed.  Consent was obtained to share chart note(s) with physicians.  Yany Mahoney plans to discuss the above information with physicians to determine an optimal risk management plan.    Yany Mahoney should contact the program with personal/family history updates as this could alter the guidelines provided and/or available test options and/or to inquire about new information specific to this case.   As stated, there may be other genes associated with cancer risk for which Yany Mahoney was not tested.  Yany Mahoney was encouraged to contact the genetics program at [phone] ([phone]) with any questions or concerns and/or to periodically review test options and related insurance coverage.

## 2020-09-25 NOTE — LETTER
09/25/20    Yany Mahoney  [address]  [place] [address]    Dear Ms. Mahoney,    Thank you for participating in the [facility] Program.  It was a pleasure working with you. Attached is documentation from our discussion(s). It will also be sent to any physicians you indicated.      You may also choose to share this documentation with your family members. Because cancer risk is based on both personal and both maternal and paternal family history factors, as well as mutation status, your relatives are recommended to review their risks and genetic testing options (if applicable) with their own healthcare providers to derive a risk-appropriate, individualized plan.      If you have any questions, concerns, or updates to your personal/family history, please contact the [facility] at [phone] to further review your case.    Please see below for your encounter report.    Sincerely,         Lia Saab, [facility]        Encounter Note       Indication for Appointment:  Yany Mahoney was referred to the Cancer Risk Assessment and Genetics Program due to a personal history of melanoma and family history of breast, melanoma, colon, and prostate cancer. Yany Mahoney previously underwent genetic testing in 2013 with [facility]'s Integrated BRACAnalysis Test; results of which were negative ([facility] Accession # [id number]). Updated genetic testing was performed.    Yany Mahoney was contacted by telephone today to discuss updated genetic test results, risk-based management guidelines and any potential additional test options. Follow up appointments to discuss the results in more detail with our medical director may be scheduled by contacting the Cancer Risk Assessment and Genetics Program.    Genetic Test Results:    RESULT:    Negative- No Pathogenic Sequence Variants Identified      LAB/TEST:  [facility]  Common Hereditary Cancers Panel with Preliminary-Evidence Genes for Breast Cancer    Genes analyzed: ABRAXAS1, AKT1, APC, SAÚL, AXIN2, BARD1, BMPR1A, BRCA1, BRCA2, BRIP1, CDH1, CDK4, CDKN2A (p14ARF), CDKN2A (v22VAJ5l), CHEK2, CTNNA1, DICER1, EPCAM, FANCC, FANCM, GREM1, HOXB13, KIT, MEN1, MLH1, MRE11, MSH2, MSH3, MSH6, MUTYH, NBN, NF1, NTHL1, PALB2, PDGFRA, PIK3CA, PMS2, POLD1, POLE, PTEN, RAD50, RAD51C, RAD51D, RECQL, RINT1, SDHA, SDHB, SDHC, SDHD, SMAD4, SMARCA4, STK11, TP53, TSC1, TSC2, VHL, XRCC2    After receiving consent, the following result(s) were disclosed to Yany Mahoney:   - No reportable alterations were identified by sequencing and/or deletion/duplication analysis as interpreted by this laboratory.  This is referred to as an indeterminate negative result.  A mutation could be present that cannot be detected by the current tests performed or in a gene not tested. Additionally, biological family members may have a mutation in any of the genes tested Yany Mahoney does not given the negative result.    Personal History:   Yany Mahoney is 50 y.o. female of [place], [place], and [place] descent.    Yany Mahoney reports that in 2018 she was diagnosed with stage 0 melanoma on her back at age 38 and is status post excision of the melanoma. Pathology and medical documentation are unavailable for review.    Past Medical History:   Diagnosis Date   • Anxiety    • Colon polyp 09/2019    1 hyperplastic polyp [id number]   • Diabetes (CMS/HCC)     only associated with pregnacy   • Elevated C-reactive protein (CRP)    • Fibrocystic breast    • Kidney stone 2000   • Melanoma (CMS/HCC) 2018    upper back between scapula   • Screening for breast cancer     annual mammograms, Type C density   • Screening for colon cancer    • Seasonal allergies      Past Medical History Pertinent Negatives:   Denies History Of: Date Noted   • Disease of thyroid gland 09/15/2020   • Fibroid  09/15/2020     Past Surgical History:   Procedure Laterality Date   •  section     • Colon surgery     • Colonoscopy      q 5 years, most recent 2019   • Cyst removal     • Mole removal  2019    melanoma post back     Past Surgical History Pertinent Negatives:   Denies History Of: Date Noted   • Breast biopsy 09/15/2020   • Hysterectomy 09/15/2020   • Oophorectomy 09/15/2020   • Salpingectomy 09/15/2020     Gynecologic History:  Menarche Age: 14 years  Age at first live birth: 40  Menopause Status: Hseri-Menopause  Use of hormonal contraceptives: Yes (birth control 1 week)  Use of fertility medications: Yes (1 cycle age 42)  Use of hormone replacement therapy: No  Use of Tamoxifen/Evista: No    Social History     Tobacco Use   • Smoking status: Never Smoker   • Smokeless tobacco: Never Used   Substance Use Topics   • Alcohol use: Yes     Comment: rare   • Drug use: No       Family History:  See completed family history in pedigree below. Of note, Yany Mahoney's mother was present at the appointment today and provided the program with a copy of her test report from when she underwent genetic testing in  at [facility]. Yany Mahoney's mother underwent genetic testing with [facility]'s 25 gene MyRisk Hereditary Cancer Panel; results of which were negative ([facility] Specimen # [id number]).        Risk Assessment and Management:  As a clinically actionable mutation was not identified by the current test method(s), the cancer risks and guidelines reviewed are based on the personal and/or family history provided. Of note, the reported history appears suggestive of an inherited presentation; risk management guidelines for such families in the absence of a clinically actionable mutation remain limited.  As guidelines continually change and the efficacy of screening for some cancers remains under investigation, Yany Mahoney is encouraged to review personal and family history  with managing physician(s) regularly.     Breast Management:  Yany Mahoney’s lifetime risk of developing breast cancer was calculated using the Tyrer-Cuzick model and is estimated to be 36.8%. Of note, this risk may be an underestimation as all breast cancer cases in the family cannot be considered in this analysis, and again, the family history suggests an inherited presentation. Breast cancer risk is dynamic and can increase with age and other factors. A high lifetime risk for developing breast cancer is typically 20-25% or higher.    - National Comprehensive Cancer Network (NCCN) guidelines for breast cancer screening include: monthly self-breast examinations, clinical breast examination performed by a physician every 6-12 months and annual mammogram to begin 10 years prior to the youngest breast cancer diagnosis in the family, but not less than age 30. The NCCN also suggests screening with breast MRI as an adjunct to mammogram in the high risk setting, with breast MRI screening beginning 10 years prior to the youngest breast cancer diagnosis in the family, but not less than age 25. As the lifetime risk for breast cancer exceeds 20%, screening, as well as the risks and benefits of risk reduction strategies should be discussed with managing physician(s).          Gynecologic Management:  Continue gynecologic exam annually or as directed by physician.    Gastrointestinal Management:  Yany Mahoney is advised to continue with colorectal cancer screening as directed by physician, or sooner if symptoms or changes in history warrant sooner evaluation. Based on the reported family history of colorectal cancer, Yany Mahoney’s lifetime risk of developing colorectal cancer is increased 1.5 fold over that of the general population.  General population risk is approximately 5-6%; thus, Yany Mahoney’s risk is 7.5-9%. This may be an underestimation of risk as it does not take into account the Yany  Jose Mahoney's personal history of colorectal polyps. The patient was encouraged to review this history and risk with managing gastroenterologist to determine frequency of colonoscopy.    Dermatologic Management:  Yany Mahoney is encouraged to practice skin protective behaviors, such as limiting direct sun exposure, using sunscreen, and pursuing skin screening as directed by managing physician.    General Management:  - Annual physical examination is encouraged.  - Adherence to a healthy lifestyle, including body mass index (BMI) <25 obtained through balanced diet and exercise, limiting intake of alcoholic beverages to less than 1 drink per day (serving equals 1 ounce of liquor, 6 ounces of wine or 8 ounces of beer) and not smoking.    Plan:  Cancer risks are based on personal history, as well as on maternal and paternal family histories, mutation status, and other factors.  Relatives are encouraged to consider risk assessment and/or genetic evaluation to derive a risk-appropriate, individualized plan.  Should family member(s) be interested in genetic evaluation, the Cancer Risk Assessment and Genetics Program can provide consultation or help to find a genetic counselor in their area.    The information provided reflects current practice guidelines and may change with new medical discoveries/technology/updated personal or family history information.  Yany Mahoney was confirmed to have understood the aforementioned information and was assisted with decision making as needed.  Informational and supportive resources were provided.  Potential psychosocial ramifications related to test results were reviewed.  Consent was obtained to share chart note(s) with physicians.  Yany Mahoney plans to discuss the above information with physicians to determine an optimal risk management plan.    Yany Mahoney should contact the program with personal/family history updates as this could alter the  guidelines provided and/or available test options and/or to inquire about new information specific to this case.   As stated, there may be other genes associated with cancer risk for which Yany Mahoney was not tested.  Yany Mahoney was encouraged to contact the genetics program at [phone] with any questions or concerns and/or to periodically review test options and related insurance coverage.

## 2020-09-25 NOTE — LETTER
09/25/20    Yany Mahoney  [address]  [place] [address]    Dear Ms. Mahoney,    Thank you for participating in the [facility].  It was a pleasure working with you. Attached is documentation from our discussion(s). It will also be sent to any physicians you indicated.      You may also choose to share this documentation with your family members. Because cancer risk is based on both personal and both maternal and paternal family history factors, as well as mutation status, your relatives are recommended to review their risks and genetic testing options (if applicable) with their own healthcare providers to derive a risk-appropriate, individualized plan.      If you have any questions, concerns, or updates to your personal/family history, please contact the [facility] at [phone] to further review your case.    Please see below for your encounter report.    Sincerely,         Lia Saab, [facility]                Encounter Note       Indication for Appointment:  Yany Mahoney was referred to the Cancer Risk Assessment and Genetics Program due to a personal history of melanoma and family history of breast, melanoma, colon, and prostate cancer. Yany Mahoney previously underwent genetic testing in 2013 with [facility]'s Integrated BRACAnalysis Test; results of which were negative ([facility] Accession # [id number]). Updated genetic testing was performed.    Yany Mahoney was contacted by telephone today to discuss updated genetic test results, risk-based management guidelines and any potential additional test options. Follow up appointments to discuss the results in more detail with our medical director may be scheduled by contacting the Cancer Risk Assessment and Genetics Program.    Genetic Test Results:    RESULT:    Negative- No Pathogenic Sequence Variants Identified      LAB/TEST:  [facility]  Common Hereditary Cancers Panel with Preliminary-Evidence Genes for Breast Cancer    Genes analyzed: ABRAXAS1, AKT1, APC, SAÚL, AXIN2, BARD1, BMPR1A, BRCA1, BRCA2, BRIP1, CDH1, CDK4, CDKN2A (p14ARF), CDKN2A (d34VUA7z), CHEK2, CTNNA1, DICER1, EPCAM, FANCC, FANCM, GREM1, HOXB13, KIT, MEN1, MLH1, MRE11, MSH2, MSH3, MSH6, MUTYH, NBN, NF1, NTHL1, PALB2, PDGFRA, PIK3CA, PMS2, POLD1, POLE, PTEN, RAD50, RAD51C, RAD51D, RECQL, RINT1, SDHA, SDHB, SDHC, SDHD, SMAD4, SMARCA4, STK11, TP53, TSC1, TSC2, VHL, XRCC2    After receiving consent, the following result(s) were disclosed to Yany Mahoney:   - No reportable alterations were identified by sequencing and/or deletion/duplication analysis as interpreted by this laboratory.  This is referred to as an indeterminate negative result.  A mutation could be present that cannot be detected by the current tests performed or in a gene not tested. Additionally, biological family members may have a mutation in any of the genes tested Yany Mahoney does not given the negative result.    Personal History:   Yany Mahoney is 50 y.o. female of [place], [place], and [place] citizen descent.    Yany Mahoney reports that in 2018 she was diagnosed with stage 0 melanoma on her back at age 38 and is status post excision of the melanoma. Pathology and medical documentation are unavailable for review.    Past Medical History:   Diagnosis Date   • Anxiety    • Colon polyp 09/2019    1 hyperplastic polyp [id number]   • Diabetes (CMS/HCC)     only associated with pregnacy   • Elevated C-reactive protein (CRP)    • Fibrocystic breast    • Kidney stone 2000   • Melanoma (CMS/HCC) 2018    upper back between scapula   • Screening for breast cancer     annual mammograms, Type C density   • Screening for colon cancer    • Seasonal allergies      Past Medical History Pertinent Negatives:   Denies History Of: Date Noted   • Disease of thyroid gland 09/15/2020   • Fibroid  09/15/2020     Past Surgical History:   Procedure Laterality Date   •  section     • Colon surgery     • Colonoscopy      q 5 years, most recent 2019   • Cyst removal     • Mole removal  2019    melanoma post back     Past Surgical History Pertinent Negatives:   Denies History Of: Date Noted   • Breast biopsy 09/15/2020   • Hysterectomy 09/15/2020   • Oophorectomy 09/15/2020   • Salpingectomy 09/15/2020     Gynecologic History:  Menarche Age: 14 years  Age at first live birth: 40  Menopause Status: Sheri-Menopause  Use of hormonal contraceptives: Yes (birth control 1 week)  Use of fertility medications: Yes (1 cycle age 42)  Use of hormone replacement therapy: No  Use of Tamoxifen/Evista: No    Social History     Tobacco Use   • Smoking status: Never Smoker   • Smokeless tobacco: Never Used   Substance Use Topics   • Alcohol use: Yes     Comment: rare   • Drug use: No       Family History:  See completed family history in pedigree below. Of note, Yany Mahoney's mother was present at the appointment today and provided the program with a copy of her test report from when she underwent genetic testing in  at [facility]. Yany Mahoney's mother underwent genetic testing with [facility]'s 25 gene MyRisk Hereditary Cancer Panel; results of which were negative ([facility] Specimen # [id number]).        Risk Assessment and Management:  As a clinically actionable mutation was not identified by the current test method(s), the cancer risks and guidelines reviewed are based on the personal and/or family history provided. Of note, the reported history appears suggestive of an inherited presentation; risk management guidelines for such families in the absence of a clinically actionable mutation remain limited.  As guidelines continually change and the efficacy of screening for some cancers remains under investigation, Yany Mahoney is encouraged to review personal and family history  with managing physician(s) regularly.     Breast Management:  Yany Mahoney’s lifetime risk of developing breast cancer was calculated using the Tyrer-Cuzick model and is estimated to be 36.8%. Of note, this risk may be an underestimation as all breast cancer cases in the family cannot be considered in this analysis, and again, the family history suggests an inherited presentation. Breast cancer risk is dynamic and can increase with age and other factors. A high lifetime risk for developing breast cancer is typically 20-25% or higher.    - National Comprehensive Cancer Network (NCCN) guidelines for breast cancer screening include: monthly self-breast examinations, clinical breast examination performed by a physician every 6-12 months and annual mammogram to begin 10 years prior to the youngest breast cancer diagnosis in the family, but not less than age 30. The NCCN also suggests screening with breast MRI as an adjunct to mammogram in the high risk setting, with breast MRI screening beginning 10 years prior to the youngest breast cancer diagnosis in the family, but not less than age 25. As the lifetime risk for breast cancer exceeds 20%, screening, as well as the risks and benefits of risk reduction strategies should be discussed with managing physician(s).          Gynecologic Management:  Continue gynecologic exam annually or as directed by physician.    Gastrointestinal Management:  Yany Mahoney is advised to continue with colorectal cancer screening as directed by physician, or sooner if symptoms or changes in history warrant sooner evaluation. Based on the reported family history of colorectal cancer, Yany Mahoney’s lifetime risk of developing colorectal cancer is increased 1.5 fold over that of the general population.  General population risk is approximately 5-6%; thus, Yany Mahoney’s risk is 7.5-9%. This may be an underestimation of risk as it does not take into account the Yany  Jose Mahoney's personal history of colorectal polyps. The patient was encouraged to review this history and risk with managing gastroenterologist to determine frequency of colonoscopy.    Dermatologic Management:  Yany Mahoney is encouraged to practice skin protective behaviors, such as limiting direct sun exposure, using sunscreen, and pursuing skin screening as directed by managing physician.    General Management:  - Annual physical examination is encouraged.  - Adherence to a healthy lifestyle, including body mass index (BMI) <25 obtained through balanced diet and exercise, limiting intake of alcoholic beverages to less than 1 drink per day (serving equals 1 ounce of liquor, 6 ounces of wine or 8 ounces of beer) and not smoking.    Plan:  Cancer risks are based on personal history, as well as on maternal and paternal family histories, mutation status, and other factors.  Relatives are encouraged to consider risk assessment and/or genetic evaluation to derive a risk-appropriate, individualized plan.  Should family member(s) be interested in genetic evaluation, the Cancer Risk Assessment and Genetics Program can provide consultation or help to find a genetic counselor in their area.    The information provided reflects current practice guidelines and may change with new medical discoveries/technology/updated personal or family history information.  Yany Mahoney was confirmed to have understood the aforementioned information and was assisted with decision making as needed.  Informational and supportive resources were provided.  Potential psychosocial ramifications related to test results were reviewed.  Consent was obtained to share chart note(s) with physicians.  Yany Mahoney plans to discuss the above information with physicians to determine an optimal risk management plan.    Yany Mahoney should contact the program with personal/family history updates as this could alter the  guidelines provided and/or available test options and/or to inquire about new information specific to this case.   As stated, there may be other genes associated with cancer risk for which Yany Mahoney was not tested.  Yany Mahoney was encouraged to contact the genetics program at [phone] with any questions or concerns and/or to periodically review test options and related insurance coverage.

## 2020-09-25 NOTE — LETTER
09/25/20    Kim Greer MD  [address]  [address] [facility] [address]  [place] [address]      Dear Dr. Grere,    Thank you for referring your patient, Yany Mahoney, to receive care through my office. I have enclosed a summary of the care provided to Yany on 09/25/20.    Please contact me with any questions you may have regarding the visit.    Sincerely,             Lia Saab, [facility]    [address]  [place] [address]    CC: No Recipients

## 2020-09-26 LAB — ESTROGEN SERPL-MCNC: 183 PG/ML

## 2020-09-29 ENCOUNTER — TELEPHONE (OUTPATIENT)
Dept: PRIMARY CARE | Facility: CLINIC | Age: 50
End: 2020-09-29

## 2020-09-29 NOTE — TELEPHONE ENCOUNTER
----- Message from Loren Castaneda MA sent at 9/29/2020 11:10 AM EDT -----  Please notify patient.

## 2020-09-29 NOTE — TELEPHONE ENCOUNTER
Patient was contacted and made aware of MRI results.    Patient requesting to look over labs to see if menopausal

## 2020-10-08 ENCOUNTER — TELEPHONE (OUTPATIENT)
Dept: PRIMARY CARE | Facility: CLINIC | Age: 50
End: 2020-10-08

## 2020-10-08 NOTE — TELEPHONE ENCOUNTER
She called b/c she is saying she has never heard from us about the results of her lyme testing.    She can be reached at - [phone].    Madeleine

## 2020-10-08 NOTE — TELEPHONE ENCOUNTER
I called [facility] and her lyme test was not done. She would have to go to the lab for another blood test.

## 2020-10-08 NOTE — TELEPHONE ENCOUNTER
I called Yany and had to leave a message on her answering machine.  [facility] and to call us back.    She can be reached at - [phone].    Madeleine

## 2020-11-30 ENCOUNTER — APPOINTMENT (OUTPATIENT)
Dept: URBAN - METROPOLITAN AREA CLINIC 200 | Age: 50
Setting detail: DERMATOLOGY
End: 2020-12-01

## 2020-11-30 DIAGNOSIS — L57.8 OTHER SKIN CHANGES DUE TO CHRONIC EXPOSURE TO NONIONIZING RADIATION: ICD-10-CM

## 2020-11-30 DIAGNOSIS — Z85.820 PERSONAL HISTORY OF MALIGNANT MELANOMA OF SKIN: ICD-10-CM

## 2020-11-30 PROCEDURE — 99213 OFFICE O/P EST LOW 20 MIN: CPT

## 2020-11-30 PROCEDURE — OTHER COUNSELING: OTHER

## 2020-11-30 PROCEDURE — OTHER PRESCRIPTION SAMPLES PROVIDED: OTHER

## 2020-11-30 PROCEDURE — OTHER MIPS QUALITY: OTHER

## 2020-11-30 ASSESSMENT — LOCATION SIMPLE DESCRIPTION DERM: LOCATION SIMPLE: CHEST

## 2020-11-30 ASSESSMENT — LOCATION ZONE DERM: LOCATION ZONE: TRUNK

## 2020-11-30 ASSESSMENT — LOCATION DETAILED DESCRIPTION DERM: LOCATION DETAILED: LEFT MEDIAL SUPERIOR CHEST

## 2020-11-30 NOTE — PROCEDURE: PRESCRIPTION SAMPLES PROVIDED
Samples Given: Aquanil, DML moisturizer, hydro boost gel samples given for dry skin on face due to mask
Detail Level: Generalized

## 2020-12-21 ENCOUNTER — TELEPHONE (OUTPATIENT)
Dept: INFECTIOUS DISEASES | Facility: HOSPITAL | Age: 50
End: 2020-12-21

## 2020-12-21 ENCOUNTER — TELEPHONE (OUTPATIENT)
Dept: PRIMARY CARE | Facility: CLINIC | Age: 50
End: 2020-12-21

## 2020-12-21 ENCOUNTER — CLINICAL SUPPORT (OUTPATIENT)
Dept: OTHER | Facility: CLINIC | Age: 50
End: 2020-12-21
Attending: INTERNAL MEDICINE
Payer: COMMERCIAL

## 2020-12-21 DIAGNOSIS — Z20.828 EXPOSURE TO SARS-ASSOCIATED CORONAVIRUS: Primary | ICD-10-CM

## 2020-12-21 DIAGNOSIS — Z20.828 EXPOSURE TO SARS-ASSOCIATED CORONAVIRUS: ICD-10-CM

## 2020-12-21 NOTE — TELEPHONE ENCOUNTER
Please schedule this patient for Covid 19 testing.  I have updated the patient's demographic information with the patient's contact information for scheduling.    Patient symptoms:Congestion / Runny Nose and Headache    Provider is: Provider Select: [facility] Provider  (Independent providers with Epic access SHOULD NOT put orders in Epic, results will not route)    Ordering provider (First - Last): PCP  Provider Best Callback # for NSQ: [phone]    This patient is a [facility] [employer] Employee: YES/NO/NA: No  If yes: notify Employee Exposure line and advise ordering provider that Employee's also need to call that team.    If an [facility] provider, advised order needs to be in Epic.    If an independent provider, they are directed to fax the order to [phone]  (Fax should include: patient name, date of birth, Covid 19 [facility] order, ICD-10 for symptoms, and either [facility] account number or fax number for [facility] to send results)    Independent provider results will arrive via [facility]   [facility] provider results will route via Epic

## 2020-12-21 NOTE — TELEPHONE ENCOUNTER
Patient called.    Current Symptoms: sinus, headaches, exposure    Date Symptoms started: 12/19    Been in contact with someone was confirmed or suspected to have COVID-19? Y    Travel Screen (traveled to any of the [facility] in past 14 days) ? N    COVID-19 ordered? Y    [facility] contacted? Y

## 2020-12-21 NOTE — PROGRESS NOTES
Patient was processed today at [facility]-19 drive-up clinic.  Pt denies any sort of medical distress today.  After identifying the patient, a nasopharyngeal sample was obtained and placed in viral transport medium.  Pt was given a post-collection education sheet and encouraged to self-isolate until they receive the results.  Pt directed to [facility] website for [facility] Privacy Practices.  Sample sent to the lab noted on the order and requisition.  Pt was without additional questions or concerns and was dispositioned from the testing area to home.

## 2020-12-23 LAB — SARS-COV-2 RNA RESP QL NAA+PROBE: DETECTED

## 2020-12-24 ENCOUNTER — TELEPHONE (OUTPATIENT)
Dept: PRIMARY CARE | Facility: CLINIC | Age: 50
End: 2020-12-24

## 2021-03-05 ENCOUNTER — OFFICE VISIT (OUTPATIENT)
Dept: PRIMARY CARE | Facility: CLINIC | Age: 51
End: 2021-03-05
Payer: COMMERCIAL

## 2021-03-05 VITALS
WEIGHT: 164 LBS | OXYGEN SATURATION: 98 % | HEART RATE: 76 BPM | TEMPERATURE: 97.8 F | SYSTOLIC BLOOD PRESSURE: 116 MMHG | BODY MASS INDEX: 28.15 KG/M2 | RESPIRATION RATE: 16 BRPM | DIASTOLIC BLOOD PRESSURE: 68 MMHG

## 2021-03-05 DIAGNOSIS — E55.9 VITAMIN D DEFICIENCY: ICD-10-CM

## 2021-03-05 DIAGNOSIS — E78.5 DYSLIPIDEMIA: ICD-10-CM

## 2021-03-05 DIAGNOSIS — R92.8 ABNORMAL MAMMOGRAM: ICD-10-CM

## 2021-03-05 DIAGNOSIS — Z80.3 FAMILY HISTORY OF MALIGNANT NEOPLASM OF BREAST: ICD-10-CM

## 2021-03-05 DIAGNOSIS — R53.83 OTHER FATIGUE: ICD-10-CM

## 2021-03-05 DIAGNOSIS — R63.5 WEIGHT GAIN FINDING: Primary | ICD-10-CM

## 2021-03-05 DIAGNOSIS — C43.59 MALIGNANT MELANOMA OF TORSO EXCLUDING BREAST (CMS/HCC): ICD-10-CM

## 2021-03-05 DIAGNOSIS — R79.82 ELEVATED C-REACTIVE PROTEIN (CRP): ICD-10-CM

## 2021-03-05 DIAGNOSIS — N92.6 IRREGULAR MENSES: ICD-10-CM

## 2021-03-05 DIAGNOSIS — K63.5 HYPERPLASTIC POLYP OF DESCENDING COLON: ICD-10-CM

## 2021-03-05 DIAGNOSIS — Z86.32 HISTORY OF GESTATIONAL DIABETES: ICD-10-CM

## 2021-03-05 DIAGNOSIS — R73.01 FASTING HYPERGLYCEMIA: ICD-10-CM

## 2021-03-05 PROCEDURE — 99214 OFFICE O/P EST MOD 30 MIN: CPT | Performed by: INTERNAL MEDICINE

## 2021-03-05 ASSESSMENT — ENCOUNTER SYMPTOMS
BRUISES/BLEEDS EASILY: 0
HEMATOLOGIC/LYMPHATIC NEGATIVE: 1
CHILLS: 0
SLEEP DISTURBANCE: 0
DIFFICULTY URINATING: 0
WHEEZING: 0
CONSTIPATION: 0
ARTHRALGIAS: 1
HEADACHES: 0
PSYCHIATRIC NEGATIVE: 1
PALPITATIONS: 0
FEVER: 0
DIARRHEA: 0

## 2021-03-05 NOTE — ASSESSMENT & PLAN NOTE
No indicative of diabetes.    Continue to monitor carb intake in diet.    Exercise 30 or more minutes daily.    Hemoglobin A1c 09/2020:  5.4%    In comparison to previous lab 09/2019:  5.2%

## 2021-03-05 NOTE — PROGRESS NOTES
Subjective      Patient ID: Yany Mahoney is a 50 y.o. female.  1970      HPI     Pt presents for 6 Month Follow Up.    Presents well. BP great today at 116/68. Pt's weight has decreased year to year.    Denies any adverse side effects to medications or medication changes.    Denies any bowel or bladder issues.    Exercises regularly and maintains great diet.    The following have been reviewed and updated as appropriate in this visit:  Tobacco  Meds  Problems  Med Hx  Surg Hx  Fam Hx  Soc Hx      Review of Systems   Constitutional: Negative for chills and fever.   HENT: Negative.  Negative for congestion.    Eyes: Negative for visual disturbance.   Respiratory: Negative for wheezing.    Cardiovascular: Negative for palpitations.   Gastrointestinal: Negative for constipation and diarrhea.   Genitourinary: Negative for difficulty urinating.   Musculoskeletal: Positive for arthralgias.   Skin: Negative for rash.   Allergic/Immunologic: Positive for environmental allergies.   Neurological: Negative for headaches.   Hematological: Negative.  Does not bruise/bleed easily.   Psychiatric/Behavioral: Negative.  Negative for sleep disturbance.       Objective     Vitals:    03/05/21 0822   BP: 116/68   BP Location: Left upper arm   Patient Position: Sitting   Pulse: 76   Resp: 16   Temp: 36.6 °C (97.8 °F)   TempSrc: Temporal   SpO2: 98%   Weight: 74.4 kg (164 lb)     Body mass index is 28.15 kg/m².    Physical Exam  Vitals signs and nursing note reviewed.   Constitutional:       Appearance: She is well-developed.   HENT:      Head: Atraumatic.   Eyes:      Pupils: Pupils are equal, round, and reactive to light.   Neck:      Musculoskeletal: Neck supple.      Thyroid: No thyromegaly.   Cardiovascular:      Rate and Rhythm: Normal rate and regular rhythm.   Pulmonary:      Breath sounds: Normal breath sounds.   Musculoskeletal: Normal range of motion.      Right lower leg: No edema.      Left lower leg: No  edema.   Skin:     General: Skin is warm and dry.      Capillary Refill: Capillary refill takes less than 2 seconds.      Findings: No rash.   Neurological:      Mental Status: She is alert and oriented to person, place, and time.   Psychiatric:         Behavior: Behavior normal.         Assessment/Plan   Diagnoses and all orders for this visit:    Weight gain finding (Primary)  Assessment & Plan:  Great job with weight loss!    Orders:  -     Hemoglobin A1c; Future    Malignant melanoma of torso excluding breast (CMS/HCC)  Assessment & Plan:  Follows with Dr. Bhatt every six months      Vitamin D deficiency  Assessment & Plan:  Continue Vitamin D3 1,000 units. Twice a day    I will recheck level    Orders:  -     Vitamin D 25 hydroxy; Future    Hyperplastic polyp of descending colon  Assessment & Plan:  Repeat Colonoscopy 09/2024 via Dr. Boles.      Irregular menses  Assessment & Plan:  Labs done in September    Follow up with GYN yearly.      Fasting hyperglycemia  Assessment & Plan:  No indicative of diabetes.    Continue to monitor carb intake in diet.    Exercise 30 or more minutes daily.    Hemoglobin A1c 09/2020:  5.4%    In comparison to previous lab 09/2019:  5.2%      Orders:  -     Hemoglobin A1c; Future  -     Comprehensive metabolic panel; Future    Dyslipidemia  Assessment & Plan:  Continue to monitor fat/cholesterol intake in diet.    Exercise 30 or more minutes daily.    Lipid Panel 09/2020:  Cholesterol 206 mg/dL  Triglycerides 51 mg/dL  HDL 99 mg/dL  LDL 98 mg/dL    In comparison to previous lab 09/2019:  Cholesterol 196 mg/dL  Triglycerides 47 mg/dL   mg/dL  LDL 84 mg/dL      Orders:  -     Lipid panel; Future    Family history of malignant neoplasm of breast  Assessment & Plan:  Per genetic counseler    Orders:  -     BI DIAGNOSTIC MAMMOGRAM BILATERAL; Future    Abnormal mammogram  -     BI DIAGNOSTIC MAMMOGRAM BILATERAL; Future    Elevated C-reactive protein (CRP)    History of  gestational diabetes  -     Hemoglobin A1c; Future  -     Comprehensive metabolic panel; Future    Other fatigue  -     CBC and differential; Future      Scribe Attestation  By signing my name below, I, Jayna De Leon, attest that this documentation has been prepared under the direction and in the presence of Dr. Shady Otero MD.  3/5/2021 8:51 AM    Provider Attestation  By signing my name below, I, Shady Otero, attalyssa that this documentation has been prepared under the direction and in the presence of Shady Otero MD      3/5/2021 8:51 AM        I spent 30 minutes on this date of service performing the following activities: obtaining history, performing examination, entering orders, documenting, preparing for visit, obtaining / reviewing records, providing counseling and education, independently reviewing study/studies and communicating results.

## 2021-03-05 NOTE — PATIENT INSTRUCTIONS
Exercise 30 minutes EVERY DAY! This helps by adding 9 years to life expectancy.    Consider adding yoga or juan chi for balance to prevent falls and strengthen legs. Planking helps to strengthen core. Water aerobics are also a great alternative for exercise.    Protect hearing when engaging in loud activity like blowing the leaves or mowing the grass to prevent hearing loss. This helps to prevent dementia.     Dental cleaning every 6 months    Vision check every 2 -3 years. (Increase to yearly at age 60.)     Last colonoscopy via Dr. Boles 09/10/2019. Next colonoscopy due 09/2024.     Recommend Pneumonia vaccine.      Recommend Tdap vaccine.    Cutting your risk of Dementia    1.Controlling blood pressure.  2. Protect your hearing.  3.Limit alcohol use to 2 drinks a day (Less than 2 oz).  4. Avoid trauma to head.  5. Do not smoke.  6. Exercising at least 30 minutes daily.    Problem List Items Addressed This Visit        Digestive    Vitamin D deficiency     Continue Vitamin D3 1,000 units. Twice a day    I will recheck level         Relevant Orders    Vitamin D 25 hydroxy    Hyperplastic polyp of descending colon     Repeat Colonoscopy 09/2024 via Dr. Boles.            Genitourinary    Irregular menses     Labs done in September    Follow up with GYN yearly.            Endocrine/Metabolic    Dyslipidemia     Continue to monitor fat/cholesterol intake in diet.    Exercise 30 or more minutes daily.    Lipid Panel 09/2020:  Cholesterol 206 mg/dL  Triglycerides 51 mg/dL  HDL 99 mg/dL  LDL 98 mg/dL    In comparison to previous lab 09/2019:  Cholesterol 196 mg/dL  Triglycerides 47 mg/dL   mg/dL  LDL 84 mg/dL           Relevant Orders    Lipid panel    Fasting hyperglycemia     No indicative of diabetes.    Continue to monitor carb intake in diet.    Exercise 30 or more minutes daily.    Hemoglobin A1c 09/2020:  5.4%    In comparison to previous lab 09/2019:  5.2%           Relevant Orders    Hemoglobin A1c     Comprehensive metabolic panel       Other    History of gestational diabetes    Relevant Orders    Hemoglobin A1c    Comprehensive metabolic panel    Other fatigue    Relevant Orders    CBC and differential    Elevated C-reactive protein (CRP)    Malignant melanoma of torso excluding breast (CMS/HCC)     Follows with Dr. Bhatt every six months         Abnormal mammogram    Relevant Orders    BI DIAGNOSTIC MAMMOGRAM BILATERAL    Family history of malignant neoplasm of breast     Per genetic counseler         Relevant Orders    BI DIAGNOSTIC MAMMOGRAM BILATERAL    Weight gain finding - Primary     Great job with weight loss!         Relevant Orders    Hemoglobin A1c

## 2021-03-05 NOTE — ASSESSMENT & PLAN NOTE
Patient arrive by EMS. Provider at bedside. Patient states here a little over a week ago, was diagnosed with spot on lung. Patient c/o right side chest pain, c/o persistent cough despite cough medicine. States brownish and white sputum. EMS squad reports hx of schizophrenia, 12 shows sinus tach, and other vitals good. Emergency Department Nursing Plan of Care       The Nursing Plan of Care is developed from the Nursing assessment and Emergency Department Attending provider initial evaluation. The plan of care may be reviewed in the ED Provider note.     The Plan of Care was developed with the following considerations:   Patient / Family readiness to learn indicated by:verbalized understanding  Persons(s) to be included in education: patient  Barriers to Learning/Limitations:No    Signed     Kris Mora RN    3/7/2017   7:46 AM Repeat Colonoscopy 09/2024 via Dr. Boles.

## 2021-03-05 NOTE — ASSESSMENT & PLAN NOTE
Continue to monitor fat/cholesterol intake in diet.    Exercise 30 or more minutes daily.    Lipid Panel 09/2020:  Cholesterol 206 mg/dL  Triglycerides 51 mg/dL  HDL 99 mg/dL  LDL 98 mg/dL    In comparison to previous lab 09/2019:  Cholesterol 196 mg/dL  Triglycerides 47 mg/dL   mg/dL  LDL 84 mg/dL

## 2021-03-30 ENCOUNTER — TELEPHONE (OUTPATIENT)
Dept: SURGERY | Facility: CLINIC | Age: 51
End: 2021-03-30

## 2021-03-30 NOTE — TELEPHONE ENCOUNTER
Spoke with patient regarding her questions about her imaging.  She believes that with her insurance that she can have her annual bilateral mammogram anytime in the calendar year, not just 1 year 1 day after the last mammogram.  She is hoping to get a bilateral mammogram done now with a left breast ultrasound instead of just the left breast mammogram and ultrasound.  I asked her to check with her insurance to be sure that this is correct, and if it is I will rewrite the prescription for bilateral mammogram for now and then she would need an ultrasound of the left breast done as well.  Assuming that is benign, then for high rescreening she will have a bilateral breast MRI in October.  She will give me a call back after she speaks with her insurance company.    ----- Message from Jennifer Rehman sent at 3/29/2021  1:44 PM EDT -----  Regarding: IMAGING QUESTIONS  Contact: [phone]  Adilia Greer,    PT Yany Mahoney and her mother have a bunch of questions concerning her upcoming imaging, a mammo and ULS of left breast only. PT was under the impression everything was fine with the left breast. She met with the genetic consouler and understands she is high risk. They wanted to know if  she is due for her routine mammogram in November sometime why does she need the limited left breast now? I stated that with PT's that fall into the high risk category we typically have imaging done for them every 6 months. But she still kept questioning why just the left if everything was ok with the left breast. She had had the MRI done that was requested. She also has concerns regarding to much radiation exposure and if all the tests are necessary? I told the PT I would rely her questions to you and that you would get back to her. Thank you and have a great day.    Best,  Jennifer

## 2021-04-01 DIAGNOSIS — R92.8 ABNORMAL MAMMOGRAM: Primary | ICD-10-CM

## 2021-04-01 NOTE — PROGRESS NOTES
Patient's insurance allows her to have a mammogram at any point in the calendar year.  She will have a diagnostic bilateral mammogram done now with a left breast ultrasound.  Prescription in chart

## 2021-04-07 ENCOUNTER — HOSPITAL ENCOUNTER (OUTPATIENT)
Dept: RADIOLOGY | Facility: HOSPITAL | Age: 51
Discharge: HOME | End: 2021-04-07
Attending: SURGERY
Payer: COMMERCIAL

## 2021-04-07 DIAGNOSIS — R92.8 ABNORMAL MAMMOGRAM: ICD-10-CM

## 2021-04-07 PROCEDURE — 77066 DX MAMMO INCL CAD BI: CPT

## 2021-04-07 PROCEDURE — 76642 ULTRASOUND BREAST LIMITED: CPT | Mod: LT

## 2021-04-12 ENCOUNTER — OFFICE VISIT (OUTPATIENT)
Dept: SURGERY | Facility: CLINIC | Age: 51
End: 2021-04-12
Payer: COMMERCIAL

## 2021-04-12 VITALS
RESPIRATION RATE: 16 BRPM | HEART RATE: 66 BPM | TEMPERATURE: 96.6 F | WEIGHT: 172 LBS | HEIGHT: 64 IN | DIASTOLIC BLOOD PRESSURE: 68 MMHG | SYSTOLIC BLOOD PRESSURE: 110 MMHG | OXYGEN SATURATION: 98 % | BODY MASS INDEX: 29.37 KG/M2

## 2021-04-12 DIAGNOSIS — Z80.3 FAMILY HISTORY OF MALIGNANT NEOPLASM OF BREAST: Primary | ICD-10-CM

## 2021-04-12 DIAGNOSIS — N64.4 MASTODYNIA: ICD-10-CM

## 2021-04-12 DIAGNOSIS — R92.8 ABNORMAL MAMMOGRAM: ICD-10-CM

## 2021-04-12 PROCEDURE — 3008F BODY MASS INDEX DOCD: CPT | Performed by: SURGERY

## 2021-04-12 PROCEDURE — 99213 OFFICE O/P EST LOW 20 MIN: CPT | Performed by: SURGERY

## 2021-04-12 NOTE — LETTER
2021     Shady Otero MD  [address]  [address]  [facility] [address]    Patient: Yany Mahoney  YOB: 1970  Date of Visit: 2021      Dear Dr. Otero:    Thank you for referring Yany Mahoney to me for evaluation. Below are my notes for this consultation.    If you have questions, please do not hesitate to call me. I look forward to following your patient along with you.         Sincerely,        Kim Greer MD        CC: DO Percy Montes Catherine D, MD  2021 11:41 AM  Signed  [facility]  Dr. Kim Greer  [address] [place] [address]  Phone: [phone]  Fax: [phone]      Patient ID: Yany Mahoney                              : 1970    Visit Date: 2021  Referring Provider: No ref. provider found   PCP: Shady Otero MD  GYN: No care team member to display    Chief Complaint: Office Visit (6 Month follow up)      Yany Mahoney is a 50 y.o.  female who presents for follow-up of abnormal breast imaging and family history of breast cancer.  Since her last visit, she had an MRI of both breasts 2020 which showed no suspicious findings.  She had a bilateral mammogram with left breast ultrasound done 2021 as a follow-up for previously noted left breast asymmetry.  The mammogram showed no suspicious changes, the asymmetry appeared fibroglandular.  Ultrasound showed no suspicious findings.  Annual screening was recommended.  She reports no changes in her breast exam.  She had a lot of questions regarding the PASH that was noted at her last mammogram.  She has previously had negative genetic testing.  NURYS risk 10-year 9.6%, lifetime 33.8%    Review of Systems   All other systems reviewed and are negative.           Physical Exam:    Vitals:   Visit Vitals  /68 (BP Location: Left upper  "arm, Patient Position: Sitting)   Pulse 66   Temp (!) 35.9 °C (96.6 °F) (Temporal)   Resp 16   Ht 1.626 m (5' 4\")   Wt 78 kg (172 lb)   LMP  (LMP Unknown)   SpO2 98%   BMI 29.52 kg/m²     Body mass index is 29.52 kg/m².      Physical Examination performed in the supine and sitting position  HEENT: Normocephalic atraumatic  Neck: Supple, no lymphadenopathy  Heart: Regular rate and rhythm  Lungs: Clear to auscultation bilaterally  Abdomen: Soft nontender nondistended  BREAST SIZE:large   SYMMETRY yes       SKIN - Skin color, texture, turgor normal. No rashes or lesions Skin is without tethering, dimpling, retraction or increased vascularity  AREOLA - normal    NIPPLES -  normal without retraction and without discharge  LYMPH NODES: infra, supra, cervical, parasternal and axillary nodes normal bilaterally  BREAST TISSUE: Right breast normal without discrete lesions. Left Breast  normal without discrete lesions.  Both breasts are dense to palpation, slight tenderness diffusely.  No dominant masses.    Breast Imaging:   Mammogram and left breast ultrasound were reviewed, also reviewed was her MRI from September 2020.  MRI shows no suspicious masses or areas of enhancement.  On mammogram, the area of previous asymmetry appears less impressive, consistent with fibroglandular tissue.  Ultrasound was negative.  Impression:  Family history of breast cancer  Abnormal findings on diagnostic imaging of the breast  Mastodynia  Recommendation and Plan:   Yany presents for follow-up family history and abnormal imaging.  I reviewed her mammogram, and see no suspicious abnormalities.  She had a lot of questions about what could have happened to the area of PASH noted on her previous mammogram.  I explained that PASH is really a pathologic finding not an imaging finding, and while the radiologist suspected that that was what she was seeing on imaging, she never had a biopsy to prove this.  I suspect that the area was just " fibroglandular tissue from the beginning.  Her MRI certainly showed no abnormality.  I reassured her that all these findings are benign.  She remains at increased risk of developing breast cancer because of her family history, and I recommended she continue high risk screening with visits with me alternating with her gynecologist, as well as MRI and mammogram.  She will be due for an MRI of both breasts in 6 months, and will see me back at that time to alternate with her gynecologist.  We will then make that her annual visit.  I recommended she continue self exams, healthy diet, and exercise.  She has some bilateral breast tenderness, and I explained this is likely related to her recent weight gain and excess body fat.  We discussed the importance of losing weight and maintaining a healthy body weight.  She will call me with any concerns, otherwise I will see her in 6 months.    All of the questions were answered.  The patient is in agreement with the treatment plan.      Return in about 6 months (around 10/12/2021).        4/13/2021   11:35 AM    MD Kim Gann MD

## 2021-04-12 NOTE — PROGRESS NOTES
"[facility]  Dr. Kim Greer  [address] [place] [address]  Phone: [phone]  Fax: [phone]      Patient ID: Yany Mahoney                              : 1970    Visit Date: 2021  Referring Provider: No ref. provider found   PCP: Shady Otero MD  GYN: No care team member to display    Chief Complaint: Office Visit (6 Month follow up)      Yany Mahoney is a 50 y.o.  female who presents for follow-up of abnormal breast imaging and family history of breast cancer.  Since her last visit, she had an MRI of both breasts 2020 which showed no suspicious findings.  She had a bilateral mammogram with left breast ultrasound done 2021 as a follow-up for previously noted left breast asymmetry.  The mammogram showed no suspicious changes, the asymmetry appeared fibroglandular.  Ultrasound showed no suspicious findings.  Annual screening was recommended.  She reports no changes in her breast exam.  She had a lot of questions regarding the PASH that was noted at her last mammogram.  She has previously had negative genetic testing.  NURYS risk 10-year 9.6%, lifetime 33.8%    Review of Systems   All other systems reviewed and are negative.           Physical Exam:    Vitals:   Visit Vitals  /68 (BP Location: Left upper arm, Patient Position: Sitting)   Pulse 66   Temp (!) 35.9 °C (96.6 °F) (Temporal)   Resp 16   Ht 1.626 m (5' 4\")   Wt 78 kg (172 lb)   LMP  (LMP Unknown)   SpO2 98%   BMI 29.52 kg/m²     Body mass index is 29.52 kg/m².      Physical Examination performed in the supine and sitting position  HEENT: Normocephalic atraumatic  Neck: Supple, no lymphadenopathy  Heart: Regular rate and rhythm  Lungs: Clear to auscultation bilaterally  Abdomen: Soft nontender nondistended  BREAST SIZE:large   SYMMETRY yes       SKIN - Skin color, texture, turgor normal. No rashes or lesions Skin is without tethering, " dimpling, retraction or increased vascularity  AREOLA - normal    NIPPLES -  normal without retraction and without discharge  LYMPH NODES: infra, supra, cervical, parasternal and axillary nodes normal bilaterally  BREAST TISSUE: Right breast normal without discrete lesions. Left Breast  normal without discrete lesions.  Both breasts are dense to palpation, slight tenderness diffusely.  No dominant masses.    Breast Imaging:   Mammogram and left breast ultrasound were reviewed, also reviewed was her MRI from September 2020.  MRI shows no suspicious masses or areas of enhancement.  On mammogram, the area of previous asymmetry appears less impressive, consistent with fibroglandular tissue.  Ultrasound was negative.  Impression:  Family history of breast cancer  Abnormal findings on diagnostic imaging of the breast  Mastodynia  Recommendation and Plan:   Yany presents for follow-up family history and abnormal imaging.  I reviewed her mammogram, and see no suspicious abnormalities.  She had a lot of questions about what could have happened to the area of PASH noted on her previous mammogram.  I explained that PASH is really a pathologic finding not an imaging finding, and while the radiologist suspected that that was what she was seeing on imaging, she never had a biopsy to prove this.  I suspect that the area was just fibroglandular tissue from the beginning.  Her MRI certainly showed no abnormality.  I reassured her that all these findings are benign.  She remains at increased risk of developing breast cancer because of her family history, and I recommended she continue high risk screening with visits with me alternating with her gynecologist, as well as MRI and mammogram.  She will be due for an MRI of both breasts in 6 months, and will see me back at that time to alternate with her gynecologist.  We will then make that her annual visit.  I recommended she continue self exams, healthy diet, and exercise.  She has some  bilateral breast tenderness, and I explained this is likely related to her recent weight gain and excess body fat.  We discussed the importance of losing weight and maintaining a healthy body weight.  She will call me with any concerns, otherwise I will see her in 6 months.    All of the questions were answered.  The patient is in agreement with the treatment plan.      Return in about 6 months (around 10/12/2021).        4/13/2021   11:35 AM    MD Kim Gann MD

## 2021-04-13 PROBLEM — N64.4 MASTODYNIA: Status: ACTIVE | Noted: 2021-04-13

## 2021-06-02 ENCOUNTER — APPOINTMENT (OUTPATIENT)
Dept: URBAN - METROPOLITAN AREA CLINIC 200 | Age: 51
Setting detail: DERMATOLOGY
End: 2021-06-06

## 2021-06-02 DIAGNOSIS — L57.8 OTHER SKIN CHANGES DUE TO CHRONIC EXPOSURE TO NONIONIZING RADIATION: ICD-10-CM

## 2021-06-02 DIAGNOSIS — Z85.820 PERSONAL HISTORY OF MALIGNANT MELANOMA OF SKIN: ICD-10-CM

## 2021-06-02 PROCEDURE — OTHER PRESCRIPTION SAMPLES PROVIDED: OTHER

## 2021-06-02 PROCEDURE — OTHER COUNSELING: OTHER

## 2021-06-02 PROCEDURE — 99213 OFFICE O/P EST LOW 20 MIN: CPT

## 2021-06-02 ASSESSMENT — LOCATION SIMPLE DESCRIPTION DERM: LOCATION SIMPLE: CHEST

## 2021-06-02 ASSESSMENT — LOCATION ZONE DERM: LOCATION ZONE: TRUNK

## 2021-06-02 ASSESSMENT — LOCATION DETAILED DESCRIPTION DERM: LOCATION DETAILED: LEFT MEDIAL SUPERIOR CHEST

## 2021-12-01 ENCOUNTER — APPOINTMENT (OUTPATIENT)
Dept: URBAN - METROPOLITAN AREA CLINIC 200 | Age: 51
Setting detail: DERMATOLOGY
End: 2021-12-01

## 2021-12-01 DIAGNOSIS — Z85.820 PERSONAL HISTORY OF MALIGNANT MELANOMA OF SKIN: ICD-10-CM

## 2021-12-01 DIAGNOSIS — Z11.52 ENCOUNTER FOR SCREENING FOR COVID-19: ICD-10-CM

## 2021-12-01 DIAGNOSIS — I83.9 ASYMPTOMATIC VARICOSE VEINS OF LOWER EXTREMITIES: ICD-10-CM

## 2021-12-01 DIAGNOSIS — L57.0 ACTINIC KERATOSIS: ICD-10-CM

## 2021-12-01 DIAGNOSIS — L57.8 OTHER SKIN CHANGES DUE TO CHRONIC EXPOSURE TO NONIONIZING RADIATION: ICD-10-CM

## 2021-12-01 PROBLEM — I83.91 ASYMPTOMATIC VARICOSE VEINS OF RIGHT LOWER EXTREMITY: Status: ACTIVE | Noted: 2021-12-01

## 2021-12-01 PROCEDURE — 99213 OFFICE O/P EST LOW 20 MIN: CPT | Mod: 25

## 2021-12-01 PROCEDURE — 17003 DESTRUCT PREMALG LES 2-14: CPT

## 2021-12-01 PROCEDURE — OTHER MIPS QUALITY: OTHER

## 2021-12-01 PROCEDURE — OTHER SCREENING FOR COVID-19: OTHER

## 2021-12-01 PROCEDURE — OTHER SUNSCREEN RECOMMENDATIONS: OTHER

## 2021-12-01 PROCEDURE — OTHER COUNSELING: OTHER

## 2021-12-01 PROCEDURE — OTHER LIQUID NITROGEN: OTHER

## 2021-12-01 PROCEDURE — 17000 DESTRUCT PREMALG LESION: CPT

## 2021-12-01 PROCEDURE — OTHER REASSURANCE: OTHER

## 2021-12-01 PROCEDURE — OTHER REFERRAL: OTHER

## 2021-12-01 ASSESSMENT — LOCATION DETAILED DESCRIPTION DERM
LOCATION DETAILED: LEFT MEDIAL SUPERIOR CHEST
LOCATION DETAILED: PERIUMBILICAL SKIN
LOCATION DETAILED: RIGHT FOREHEAD
LOCATION DETAILED: RIGHT DISTAL PRETIBIAL REGION
LOCATION DETAILED: LEFT DISTAL DORSAL FOREARM

## 2021-12-01 ASSESSMENT — LOCATION ZONE DERM
LOCATION ZONE: LEG
LOCATION ZONE: TRUNK
LOCATION ZONE: FACE
LOCATION ZONE: ARM

## 2021-12-01 ASSESSMENT — LOCATION SIMPLE DESCRIPTION DERM
LOCATION SIMPLE: RIGHT FOREHEAD
LOCATION SIMPLE: RIGHT PRETIBIAL REGION
LOCATION SIMPLE: LEFT FOREARM
LOCATION SIMPLE: CHEST
LOCATION SIMPLE: ABDOMEN

## 2021-12-01 ASSESSMENT — PAIN INTENSITY VAS: HOW INTENSE IS YOUR PAIN 0 BEING NO PAIN, 10 BEING THE MOST SEVERE PAIN POSSIBLE?: NO PAIN

## 2021-12-01 NOTE — PROCEDURE: LIQUID NITROGEN
Render Note In Bullet Format When Appropriate: No
Detail Level: Detailed
Post-Care Instructions: I reviewed with the patient in detail post-care instructions. Patient is to wear sunprotection, and avoid picking at any of the treated lesions. Pt may apply Vaseline to crusted or scabbing areas.
Show Aperture Variable?: Yes
Duration Of Freeze Thaw-Cycle (Seconds): 2
Consent: The patient's consent was obtained including but not limited to risks of crusting, scabbing, blistering, scarring, darker or lighter pigmentary change, recurrence, incomplete removal and infection.
Number Of Freeze-Thaw Cycles: 1 freeze-thaw cycle

## 2022-03-07 ENCOUNTER — OFFICE VISIT (OUTPATIENT)
Dept: BARIATRICS/WEIGHT MGMT | Facility: CLINIC | Age: 52
End: 2022-03-07
Payer: COMMERCIAL

## 2022-03-07 VITALS
OXYGEN SATURATION: 97 % | WEIGHT: 188.4 LBS | HEART RATE: 72 BPM | TEMPERATURE: 98.3 F | SYSTOLIC BLOOD PRESSURE: 110 MMHG | HEIGHT: 64 IN | DIASTOLIC BLOOD PRESSURE: 70 MMHG | RESPIRATION RATE: 12 BRPM | BODY MASS INDEX: 32.17 KG/M2

## 2022-03-07 DIAGNOSIS — E66.811 CLASS 1 OBESITY DUE TO EXCESS CALORIES WITH SERIOUS COMORBIDITY AND BODY MASS INDEX (BMI) OF 32.0 TO 32.9 IN ADULT: ICD-10-CM

## 2022-03-07 DIAGNOSIS — N20.0 KIDNEY STONES: ICD-10-CM

## 2022-03-07 DIAGNOSIS — E55.9 VITAMIN D DEFICIENCY: ICD-10-CM

## 2022-03-07 DIAGNOSIS — E78.5 DYSLIPIDEMIA: ICD-10-CM

## 2022-03-07 DIAGNOSIS — E66.09 CLASS 1 OBESITY DUE TO EXCESS CALORIES WITH SERIOUS COMORBIDITY AND BODY MASS INDEX (BMI) OF 32.0 TO 32.9 IN ADULT: ICD-10-CM

## 2022-03-07 DIAGNOSIS — R73.01 IMPAIRED FASTING GLUCOSE: Primary | ICD-10-CM

## 2022-03-07 DIAGNOSIS — K63.5 HYPERPLASTIC POLYP OF DESCENDING COLON: ICD-10-CM

## 2022-03-07 PROBLEM — R63.5 WEIGHT GAIN FINDING: Status: RESOLVED | Noted: 2020-09-22 | Resolved: 2022-03-07

## 2022-03-07 PROBLEM — E66.9 OBESITY: Status: ACTIVE | Noted: 2022-03-07

## 2022-03-07 PROCEDURE — 99214 OFFICE O/P EST MOD 30 MIN: CPT | Performed by: STUDENT IN AN ORGANIZED HEALTH CARE EDUCATION/TRAINING PROGRAM

## 2022-03-07 PROCEDURE — 3008F BODY MASS INDEX DOCD: CPT | Performed by: STUDENT IN AN ORGANIZED HEALTH CARE EDUCATION/TRAINING PROGRAM

## 2022-03-07 RX ORDER — SEMAGLUTIDE 0.25 MG/.5ML
0.25 INJECTION, SOLUTION SUBCUTANEOUS
Qty: 2 ML | Refills: 0 | Status: SHIPPED | OUTPATIENT
Start: 2022-03-07 | End: 2022-03-23 | Stop reason: DRUGHIGH

## 2022-03-07 ASSESSMENT — ENCOUNTER SYMPTOMS
ALLERGIC/IMMUNOLOGIC NEGATIVE: 1
PSYCHIATRIC NEGATIVE: 1
ABDOMINAL DISTENTION: 1
MUSCULOSKELETAL NEGATIVE: 1
HEMATOLOGIC/LYMPHATIC NEGATIVE: 1
UNEXPECTED WEIGHT CHANGE: 1
CARDIOVASCULAR NEGATIVE: 1
NEUROLOGICAL NEGATIVE: 1
ENDOCRINE NEGATIVE: 1
EYES NEGATIVE: 1
RESPIRATORY NEGATIVE: 1

## 2022-03-07 NOTE — ASSESSMENT & PLAN NOTE
Educated patient that higher BMI is associated with increased risks of colon and rectal cancers in both sexes, with a higher increased risk in men.   Encouraged patient to follow up for screening colonoscopy.   See assessment and plan under obesity for further details of plan for weight management

## 2022-03-07 NOTE — ASSESSMENT & PLAN NOTE
Educated patient that elevated cholesterol levels often come from a blend of genetic and environmental factors.  Maximizing nutrition and physical activity is an important adjunct to medical management to treat elevated cholesterol levels and reduce the risk of atherosclerotic disease such as heart attack, stroke, and peripheral arterial disease.  See assessment and plan under obesity for further details of plan for weight management.

## 2022-03-07 NOTE — PROGRESS NOTES
DETAILS OF CONSULTATION     Consulting Service:  [facility]  Referring Physician: Vero Lao DO  Reason for Consultation:   Chief Complaint   Patient presents with   • Weight Management        HISTORY OF PRESENT ILLNESS - ASSESSMENT AND PLAN BY PROBLEM      Yany Mahoney is a 51 y.o. female with vitamin D deficiency, colon polyps, dyslipidemia, malignant melanoma, kidney stones, anxiety, sleep apnea, seasonal allergies, impaired fasting glucose and obesity here to discuss how weight loss through optimized nutrition, physical activity, and behavior modification can improve weight related conditions.      Lifetime Weight History and Trends:   Previous weight loss strategies: Gym  Previous weight loss medications:  Phentermine  Previous surgical interventions: Blank      Program Interests:  Nutrition plan: Yes  Medications: Yes  Surgery: Yes  Meal replacements: Yes  Supplements: Yes     Goal/Healthy weight: 125 lbs  Onset of weight difficulty: 2020  Age and weight at first weight loss attempt: Unknown  Weight trend in the past year: Increasing  Lowest adult weight: 125-130 lbs, 48 Yo  Highest adult weight: 198 lbs, 39 Yo  Childhood weight category: Normal   Most significant weight loss: 45 lbs  Number of yo-yo dieting episodes: 1-2  Life events related to weight gained: Pandemic, loss Job     Daily Routine and Food/Beverage Patterns:   Eating interval: 6a-8p  Meal structure (planned meals/snack per day): 2-3 meals, several snacks  After dinner eating: 3+  Night eatin  Frequent snacking (>2): Yes  Number of meals per week not prepared at home: 1-2     Food tracking method: Yes  Current eating plan: Watch sugar,carbs  Food Allergies/Intolerances: None  Person who does grocery shopping: Self  Person who does meal preparation:Self,      24 hour recall:  Breakfast:  Coffee  Lunch: Lettuce chicken wrap/tsp vivar  Dinner: Grilled chicken, spinach  Snacks: Orange,  cheese/four crackers     Daily water intake: 5-8  Daily coffee or tea intake: 2 Coffee/creamer, sweet, 2 Tea, creamer  Sweetened beverages: 1  Protein shake  Diet or artificial sweetened beverages: 1 Diet drink  Alcohol: 0     Number of vegetable servings per day: 2-3  Number of fruit servings per day: 1-2  Number of protein servings per day: 3  Number of starches/grains per day: 0  Number of processed foods per day: 1-2      Non Hunger and Disordered Eating:  Emotional (bored, irritable, lonely, tired, stressed, sad, celebrating): All  Mindless (stay awake, procrastinate, , fast eating): All  Availability (social parties, free, offered): Available, convenient, free, offered  Overeating (large portions):  Full, overfull  Distracted (driving, working, watching TV, using phone, computer or tablet): Computer, driving, working, cooking, reading, TV  Hunger Patterns (always, never, ravenous, skips meals, forget to eat): Always hungry, never full  Intense food cravings: Sugar , carbs  Often food cravings: Everyday  Food Addiction: No  Binge Eating Screen: 1-2     Comments: The biggest thing I noticed is that I am never satisfied  after I eat and I'm always thinking about food. I thought it might be candida Or insulin resistance metabolic syndrome or non-fatty liver. Probiotics anddigestive enzymes help a little bit, But I believe more is going on. I started gaining  weight quickly around my stomach almost to the point where it's difficult to breath at times.     Stressors:  Current level of life stress: High  Significant stressors: Loss of job  Rejuvenating activities: Walking, movies, reading  Rejuvenating frequency activities: Weekly  Relaxation techniques: Unknown     Physical Activity:  Activity tracker: Yes  Number of steps per day: 6-8k  Activity level at work: Blank   Activity level at home: Moderate active  Hours of work screen: 8-10   Hours of leisure screen: 1-2   Current exercise: Yes  How often  and type of exercise: 2-3 x week at gym doing classes - doing 3-5 mile walks a few times a week   Barriers to physical activity: Embarrassed by how I look     Sleep:  Average hours of sleep per night: 5-7  Sleep is unrestful  Trouble falling asleep: No   Trouble staying asleep: Yes  Snoring or witnessed apneas: Yes    Potentially Weight Positive Medications: None    Current status of the weight related conditions, relevant history, and new concerns:  Assessment   Problem List Items Addressed This Visit        Digestive    Vitamin D deficiency    Current Assessment & Plan     Educated patient that Vitamin D is important to reduce inflammation, modulate processes such as cell growth, neuromuscular and immune function, as well as glucose metabolism.   Counseled patient that low Vitamin D levels can leave inflammation unchecked, lead to suboptimal glucose metabolism as well as worsen insulin resistance. All this in turn can make weight loss more challenging.   Counseled patient that the optimal range for Vitamin D levels for weight loss is greater than 50.  Vitamin D supplements can be obtained over the counter at the pharmacy, adequate repletion can be either 2000 IU daily or 10,0000 IU once weekly. Vitamin D is best absorbed when taken with food or a meal that contains fat.  Check vitamin D level to ensure adequate response to therapy.   See assessment and plan under obesity for further details of plan for weight management.             Relevant Orders    Vitamin D 25 hydroxy    Hyperplastic polyp of descending colon    Overview     Colonoscopy 9/2019    Dr Boles one polyp removed      Follow up 5 years         Current Assessment & Plan     Educated patient that higher BMI is associated with increased risks of colon and rectal cancers in both sexes, with a higher increased risk in men.   Encouraged patient to follow up for screening colonoscopy.   See assessment and plan under obesity for further details of plan for  weight management              Genitourinary    Kidney stones    Current Assessment & Plan     Noted this is a contraindication to Topamax.             Endocrine/Metabolic    Dyslipidemia    Current Assessment & Plan     Educated patient that elevated cholesterol levels often come from a blend of genetic and environmental factors.  Maximizing nutrition and physical activity is an important adjunct to medical management to treat elevated cholesterol levels and reduce the risk of atherosclerotic disease such as heart attack, stroke, and peripheral arterial disease.  See assessment and plan under obesity for further details of plan for weight management.             Impaired fasting glucose - Primary    Current Assessment & Plan     We discussed today that without change in lifestyle, impaired fasting glucose can progress to prediabetes and then type 2 diabetes over time.  Continuing to have the same eating plan, level of nutrition, and lifestyle will not result in stable blood sugars over time.  Doing the same thing will result in worsening of the condition.  The only way to stop progression or produce remission of insulin resistance is to eat a whole foods based diet, low in simple carbohydrates, get adequate physical activity, manage stress, and eat only in response to hunger.  Certain medications are also available to help to improve insulin sensitivity.   See assessment and plan under obesity for further details of plan for weight management.           Relevant Orders    Hemoglobin A1c    Comprehensive metabolic panel    CBC    Obesity    Overview     [facility] Intake: 03/07/22  Weight: 188.4           BF%: 38.6     REE: 1435  Waist: 36    Co morbidities and Obesity Risk Factors: vitamin D deficiency, colon polyps, dyslipidemia, malignant melanoma, kidney stones, anxiety, sleep apnea, seasonal allergies, perimenopause, prolonged eating interval, frequent snacking, after dinner eating, natural sweetener use, emotional  eating when bored, irritable, lonely, tired, stressed, sad, celebrating, mindless eating to stay awake, procrastinate, , fast eating, overeating large portions, distracted eating while on computer, driving, working, cooking, reading, TV, disordered eating patterns, always hungry, never full, intense food cravings, high levels of stress with insufficient relaxation, insufficient cardiovascular exercise and resistance training, insufficient sleep time with unrestful sleep.     Medications Discussed or Started: Wegovy  Medications Contraindicated: Topamax given kidney stones     Nutrition Plan: Hunger Hormone Balance Plan     Calories 1200  4 or more high fiber choices  3 low fiber choices  5 Protein choices  4 Fat choices             Current Assessment & Plan     Obesity is a chronic, progressive, relapsing, multi-factorial, neurobehavioral disease, wherein an increase in body fat promotes adipose tissue dysfunction and abnormal fat mass physical forces, resulting in adverse metabolic, biomechanical, and psychosocial health consequences.    Weight loss through a multifaceted approach addressing metabolic factors, nutrition, physical activity, and mental well being will help this patient improve or resolve conditions related to weight and increased fat mass as identified as above.      Our plan of action includes frequent visits over the next year to focus on education regarding the appropriate nutrition and physical activity best suited to our patient's individual needs, SMART goal setting, mindfulness, stress reduction, accountability and review of any initiated medical interventions.  Visits will space out over time, as able, with formation of new life habits and stabilization of the treatment plan. We will enlist the services of the nutritionist, and may in the future consult with exercise physiology or emotional wellness.       Plan for lifestyle change will begin with the following steps:      Nutrition:   -Begin following the nutrition plan as outlined above with 3 meals and 0-1 snacks per day with intermittent fasting technique   -Begin keeping track of nutritional servings throughout the day with lifestyle journal provided or phone [facility]  -Follow up for nutritional support and food log review     Exercise:  -Goal of 7-10k steps per day for non exercise activity  -Goal of cardiovascular exercise 150 minutes each week   -Goal of resistance or strength training 90 minutes each week    Medical Management:  -Ordered adiposity relevant bloodwork with HgBA1c, CBC, CMP and Vitamin D.  -Counseled patient as below regarding initiation of Wegovy  Dosin.25 mg once weekly for 4 weeks, then increase to 0.5 mg once weekly for 4 weeks, then increase to 1 mg once weekly for 4 weeks, then increase to 1.7 mg once weekly for 4 weeks, then increase to 2.4 mg once weekly for 4 weeks and remain on this dose.   You can take Wegovy with or without food. You may change the day of the week you use Wegovy as long as your last dose was taken 2 or more days before.  If you miss a dose of Wegovy , take the missed dose as soon as possible within 5 days after the missed dose. If more than 5 days have passed, skip the missed dose and take your next dose on the regularly scheduled day. Using the pen, Wegovy is injected under the skin of your abdomen, thigh, or upper arm. Do not inject into a muscle or vein. Change (rotate) your injection site with each injection. Do not use the same site for each injection. If you choose to inject in the same area, always use a different spot in that area.  SIDE EFFECTS: Nausea, fullness, constipation. This gets better with time. You could get a small lump at the injection site.   WHO SHOULD NOT TAKE WEGOVY: Anyone with a history of pancreatitis, MEN2 syndrome, or kidney problems.  A woman of childbearing age who is planning on becoming pregnant or is nursing.                                 I have  reviewed the patient's past medical and surgical history, family history, psychiatric, and social history.     Pertinent negative history:   Patient denies history of anorexia, bulimia, addiction, ADHD, seizures, pancreatitis, gallstones, coronary artery disease, valvular disease, cardiomyopathy, arrhythmias, gout, chronic renal disease, disorders of electrolyte imbalance.     INFORMATION FROM INTAKE FORMS:    See details as recorded in note by support staff (MA or NP).     PAST MEDICAL AND SURGICAL HISTORY        Past Medical History:   Diagnosis Date   • Anxiety    • Colon polyp 2019    1 hyperplastic polyp [id number]   • Diabetes (CMS/HCC)     only associated with pregnacy   • Elevated C-reactive protein (CRP)    • Fibrocystic breast    • Kidney stone    • Melanoma (CMS/HCC) 2018    upper back between scapula   • Screening for breast cancer     annual mammograms, Type C density   • Screening for colon cancer    • Seasonal allergies        Past Surgical History:   Procedure Laterality Date   •  SECTION     • COLON SURGERY     • COLONOSCOPY      q 5 years, most recent    • CYST REMOVAL     • MOLE REMOVAL  2019    melanoma post back       PCP: Shady Otero MD    MEDICATIONS          Current Outpatient Medications:   •  ascorbic acid (VITAMIN C ORAL), Take by mouth., Disp: , Rfl:   •  biotin 10 mg tablet, Take 10 mg by mouth daily.  , Disp: , Rfl:   •  cholecalciferol, vitamin D3, 1,000 unit tablet, Take by mouth., Disp: , Rfl:   •  coenzyme Q10 (COQ10) 10 mg capsule, Take 10 mg by mouth daily.  , Disp: , Rfl:   •  magnesium oxide (MAG-OX) 400 mg (241.3 mg magnesium) tablet, Take 400 mg by mouth daily., Disp: , Rfl:   •  multivitamin (THERAGRAN) tablet, Take by mouth., Disp: , Rfl:   •  omega-3 fatty acids-fish oil 300-1,000 mg capsule, Take by mouth., Disp: , Rfl:   •  ZINC-15 66 mg tablet, Take by mouth., Disp: , Rfl:   •  WEGOVY 0.25 mg/0.5 mL pen injector, Inject 0.5 mL (0.25 mg total)  "under the skin every (seven) 7 days., Disp: 2 mL, Rfl: 0    ALLERGIES        Bee sting [bee venom protein (honey bee)]    FAMILY HISTORY        Family History   Problem Relation Age of Onset   • Breast cancer Biological Mother 66   • Hypertension Biological Mother    • Colon polyps Biological Mother    • Breast cancer Maternal Grandmother 65   • Melanoma Biological Sister 42   • Breast cancer Other         maternal great-grandmother       SOCIAL/ TOBACCO HISTORY        Social History     Tobacco Use   • Smoking status: Never Smoker   • Smokeless tobacco: Never Used   Vaping Use   • Vaping Use: Never used   Substance Use Topics   • Alcohol use: Yes     Comment: rare   • Drug use: No     Social History     Social History Narrative          Has children         REVIEW OF SYSTEMS        Review of Systems   Constitutional: Positive for unexpected weight change.   HENT: Negative.    Eyes: Negative.    Respiratory: Negative.    Cardiovascular: Negative.    Gastrointestinal: Positive for abdominal distention.   Endocrine: Negative.    Genitourinary: Negative.    Musculoskeletal: Negative.    Skin: Negative.    Allergic/Immunologic: Negative.    Neurological: Negative.    Hematological: Negative.    Psychiatric/Behavioral: Negative.         PHYSICAL EXAMINATION      Visit Vitals  /70 (BP Location: Left upper arm, Patient Position: Sitting)   Pulse 72   Temp 36.8 °C (98.3 °F)   Resp 12   Ht 1.626 m (5' 4\")   Wt 85.5 kg (188 lb 6.4 oz)   LMP 09/15/2021   SpO2 97%   BMI 32.34 kg/m²        Physical Exam  Vitals reviewed.   Constitutional:       Appearance: Normal appearance. She is well-developed. She is obese.   HENT:      Head: Normocephalic and atraumatic.      Right Ear: External ear normal.      Left Ear: External ear normal.   Eyes:      General: Lids are normal.      Extraocular Movements: Extraocular movements intact.      Conjunctiva/sclera: Conjunctivae normal.      Pupils: Pupils are equal, " round, and reactive to light.   Pulmonary:      Effort: Pulmonary effort is normal.      Breath sounds: Normal breath sounds. No decreased breath sounds.   Abdominal:      General: Abdomen is protuberant.   Musculoskeletal:      Cervical back: Normal range of motion.   Skin:     General: Skin is warm and dry.   Neurological:      General: No focal deficit present.      Mental Status: She is alert and oriented to person, place, and time. Mental status is at baseline.   Psychiatric:         Attention and Perception: Attention and perception normal.         Mood and Affect: Mood and affect normal.         Speech: Speech normal.         Behavior: Behavior normal. Behavior is cooperative.         Thought Content: Thought content normal.         Cognition and Memory: Cognition and memory normal.         Judgment: Judgment normal.           LABS / IMAGING / EKG        I have reviewed the patient's pertinent labs.  Lab Results   Component Value Date    HGBA1C 5.4 09/23/2020     Lab Results   Component Value Date    CHOL 206 (H) 09/23/2020    CHOL 196 09/19/2019    CHOL 186 10/02/2018     Lab Results   Component Value Date    HDL 99 09/23/2020     09/19/2019    HDL 92 10/02/2018     Lab Results   Component Value Date    LDLCALC 98 09/23/2020    LDLCALC 84 09/19/2019    LDLCALC 83 10/02/2018     Lab Results   Component Value Date    TRIG 51 09/23/2020    TRIG 47 09/19/2019    TRIG 56 10/02/2018     No results found for: CHOLHDL  Lab Results   Component Value Date    TSH 1.210 09/23/2020     Lab Results   Component Value Date    WBC 5.8 09/23/2020    HGB 14.0 09/23/2020    HCT 43.3 09/23/2020    MCV 86 09/23/2020     09/23/2020     Lab Results   Component Value Date     09/23/2020    K 4.3 09/23/2020     09/23/2020    CO2 27 09/23/2020    BUN 14 09/23/2020    CREATININE 0.78 09/23/2020    GLUCOSE 103 (H) 09/23/2020    AST 20 09/23/2020    ALT 18 09/23/2020    PROTEIN 6.8 09/23/2020    ALBUMIN 4.1  09/23/2020    BILITOT 0.6 09/23/2020    EGFR 88 09/23/2020    EGFR 101 09/23/2020    ANIONGAP 6 08/07/2015      CONSULTATION TIME   Over half of today's visit was spent in education and counseling regarding  nutrition, physical activity, metabolism, and weight loss goals and benefits.     I spent 60 minutes on this date of service performing the following activities: obtaining history, performing examination, entering orders, documenting, preparing for visit, obtaining / reviewing records, and providing counseling and education.    Prior to this visit, if available, I reviewed all recent primary care and specialist office visit notes as well as recent labwork, imaging studies or procedures which the patient has completed in the past year. I reviewed and prepared the chart with the patient's answers to the [facility] intake paperwork.     Cally Smith MD  3/7/2022

## 2022-03-07 NOTE — ASSESSMENT & PLAN NOTE
We discussed today that without change in lifestyle, impaired fasting glucose can progress to prediabetes and then type 2 diabetes over time.  Continuing to have the same eating plan, level of nutrition, and lifestyle will not result in stable blood sugars over time.  Doing the same thing will result in worsening of the condition.  The only way to stop progression or produce remission of insulin resistance is to eat a whole foods based diet, low in simple carbohydrates, get adequate physical activity, manage stress, and eat only in response to hunger.  Certain medications are also available to help to improve insulin sensitivity.   See assessment and plan under obesity for further details of plan for weight management.

## 2022-03-07 NOTE — PATIENT INSTRUCTIONS
Wegovy dosing and storage instructions: Wegovy must be stored in the refrigerator.  There is 1 dose per pen.  There are 4 pens per month, you will need a new prescription each month and need to send me a message the day you take your final dose each month telling me what dose you are currently taking so I know which next dosage to send to your pharmacy.  Injection sites include your lower abdomen or upper thighs.  Please rotate sites weekly.    Month 1: Inject 0.25 mg weekly  Month 2: Inject 0.5 mg weekly  Month 3: Inject 1 mg weekly  Month 4: Inject 1.7 mg weekly  Month 5: Inject 2.4 mg weekly.    Most common side effects for this medication include mild nausea and constipation.     If you develop nausea, you do not want to increase to the next higher dose until nausea has resolved.  Mild nausea is not a reason to stop the medication.  When you require a refill, please contact the office at least 5 days in advance and let us know how you are tolerating your current injection.  This will allow us to determine if we increase your prescription or repeat the same dose for another month.    If you develop slow bowels, the best defense is increasing your water intake and adding a fiber supplement like Metamucil or FiberCon to your daily regimen.  You could also use MiraLAX 1 capful daily or dulcosate (colace or dulcolax)  as a stool softener 1-2 tabs twice daily.  If you go 3 days without a bowel movement, please use milk of magnesium 30 ml every 12 hours for 3 doses or until BM.     Rare but serious side effects include:  You should not take this medication if you are pregnant, nursing, or plan to become pregnant in the next 3 months.  You should not take this medicine if you have history of unexplained pancreatitis.  If you develop severe abdominal pain while taking this medication, please discontinue the medication and contact our office.  In mouse models, this medication induced a rare form of thyroid tumor called a  medullary thyroid tumor.  While we have not seen this happen in humans in the past 10 years of using this type of medication, anyone with a family history of this rare thyroid tumor, should not use this medication.  Please let us know if you develop any difficulty swallowing, or voice changes while on this medication as this could be a sign of a problem developing with your thyroid.

## 2022-03-07 NOTE — ASSESSMENT & PLAN NOTE
Obesity is a chronic, progressive, relapsing, multi-factorial, neurobehavioral disease, wherein an increase in body fat promotes adipose tissue dysfunction and abnormal fat mass physical forces, resulting in adverse metabolic, biomechanical, and psychosocial health consequences.    Weight loss through a multifaceted approach addressing metabolic factors, nutrition, physical activity, and mental well being will help this patient improve or resolve conditions related to weight and increased fat mass as identified as above.      Our plan of action includes frequent visits over the next year to focus on education regarding the appropriate nutrition and physical activity best suited to our patient's individual needs, SMART goal setting, mindfulness, stress reduction, accountability and review of any initiated medical interventions.  Visits will space out over time, as able, with formation of new life habits and stabilization of the treatment plan. We will enlist the services of the nutritionist, and may in the future consult with exercise physiology or emotional wellness.       Plan for lifestyle change will begin with the following steps:     Nutrition:   -Begin following the nutrition plan as outlined above with 3 meals and 0-1 snacks per day with intermittent fasting technique   -Begin keeping track of nutritional servings throughout the day with lifestyle journal provided or phone Magali  -Follow up for nutritional support and food log review     Exercise:  -Goal of 7-10k steps per day for non exercise activity  -Goal of cardiovascular exercise 150 minutes each week   -Goal of resistance or strength training 90 minutes each week    Medical Management:  -Ordered adiposity relevant bloodwork with HgBA1c, CBC, CMP and Vitamin D.  -Counseled patient as below regarding initiation of Wegovy  Dosin.25 mg once weekly for 4 weeks, then increase to 0.5 mg once weekly for 4 weeks, then increase to 1 mg once weekly for 4 weeks,  then increase to 1.7 mg once weekly for 4 weeks, then increase to 2.4 mg once weekly for 4 weeks and remain on this dose.   You can take Wegovy with or without food. You may change the day of the week you use Wegovy as long as your last dose was taken 2 or more days before.  If you miss a dose of Wegovy , take the missed dose as soon as possible within 5 days after the missed dose. If more than 5 days have passed, skip the missed dose and take your next dose on the regularly scheduled day. Using the pen, Wegovy is injected under the skin of your abdomen, thigh, or upper arm. Do not inject into a muscle or vein. Change (rotate) your injection site with each injection. Do not use the same site for each injection. If you choose to inject in the same area, always use a different spot in that area.  SIDE EFFECTS: Nausea, fullness, constipation. This gets better with time. You could get a small lump at the injection site.   WHO SHOULD NOT TAKE WEGOVY: Anyone with a history of pancreatitis, MEN2 syndrome, or kidney problems.  A woman of childbearing age who is planning on becoming pregnant or is nursing.

## 2022-03-07 NOTE — PROGRESS NOTES
Lifetime Weight History and Trends:   Previous weight loss strategies: Gym  Previous weight loss medications:  Phentermine  Previous surgical interventions: Blank     Program Interests:  Nutrition plan: Yes  Medications: Yes  Surgery: Yes  Meal replacements: Yes  Supplements: Yes    Goal/Healthy weight: 125 lbs  Onset of weight difficulty: 2020  Age and weight at first weight loss attempt: Unknown  Weight trend in the past year: Increasing  Lowest adult weight: 125-130 lbs, 50 Yo  Highest adult weight:198 lbs, 39 Yo  Childhood weight category: Unknown  Most significant weight loss: 45 lbs  Number of yo-yo dieting episodes: 1-2  Life events related to weight gained: Pandemic, loss Job    Daily Routine and Food/Beverage Patterns:   Eating interval: 6a-8p  Meal structure (planned meals/snack per day): 2-3 meals, several snacks  After dinner eating: 3+  Night eatin  Frequent snacking (>2): Yes  Number of meals per week not prepared at home: 1-2    Food tracking method: Yes  Current eating plan: Watch sugar,carbs  Food Allergies/Intolerances: None  Person who does grocery shopping: Self  Person who does meal preparation:Self,     24 hour recall:  Breakfast:  Coffee  Lunch: Lettuce chicken wrap/tsp vivar  Dinner: Grilled chicken, spinach  Snacks: Orange, cheese/four crackers    Daily water intake: 5-8  Daily coffee or tea intake: 2 Coffee/creamer, sweet, 2 Tea, creamer  Sweetened beverages: 1  Protein shake  Diet or artificial sweetened beverages: 1 Diet drink  Alcohol: 0    Number of vegetable servings per day: 2-3  Number of fruit servings per day: 1-2  Number of protein servings per day: 3  Number of starches/grains per day: 0  Number of processed foods per day: 1-2     Non Hunger and Disordered Eating:  Emotional (bored, irritable, lonely, tired, stressed, sad, celebrating): All  Mindless (stay awake, procrastinate, , fast eating): All  Availability (social parties, free, offered): Available,  convenient, free, offered  Overeating (large portions):  Full, overfull  Distracted (driving, working, watching TV, using phone, computer or tablet): Computer, driving, working, cooking, reading, TV  Hunger Patterns (always, never, ravenous, skips meals, forget to eat): Always hungry, never full  Intense food cravings: Sugar , carbs  Often food cravings: Everyday  Food Addiction: No  Binge Eating Screen: 1-2    Comments: The biggest thing I noticed is that I am never satisfied   after I eat and I'm always thinking about food. I thought it might be candida  Or insulin resistance metabolic syndrome or non-fatty liver. Probiotics and    digestive enzymes help a little bit, But I believe more is going on.I started gaining   weight quickly around my stomach almost to the point where it's difficult to breath at times.    Stressors:  Current level of life stress: High  Significant stressors: Loss of job  Rejuvenating activities: Walking, movies, reading  Rejuvenating frequency activities: Weekly  Relaxation techniques: Unknown    Physical Activity:  Activity tracker: Yes  Number of steps per day: 6-8k  Activity level at work: Blank   Activity level at home: Moderate active  Hours of work screen: 8-10   Hours of leisure screen: 1-2   Current exercise: Yes  How often and type of exercise: 2-3 x wk, walking  Barriers to physical activity: Embarrassed by how I look    Sleep:  Average hours of sleep per night: 5-7  Sleep is unrestful  Trouble falling asleep: No   Trouble staying asleep: Yes  Snoring or witnessed apneas: Yes   Questions YES NO   1 During the last 3 months, did you have any episodes of excessive overeating (i.e. eating significantly more than what most people would eat in a similar period)?   Y    NOTE: IF YOU ANSWERED “NO” TO QUESTION 1, YOU MAY STOP. THE REMAINING QUESTIONS DO NOT APPLY TO YOU     2 Do you feel distresses about your episodes of excessive overeating?         Within the past 3 months… Never or  Rarely Sometimes Often Always   3 During your episodes of excessive overwaiting, how often did you feel like you had no control over your eating (e.g., not being able to stop eating, feel compelled to eat, or going back and forth for more food)?       4 During your episodes of excessive overeating, how often did you continue eating even though you were not hungry?       5 During your episodes of excessive overeating, how often were you embarrassed by how much you ate?       6 During your episodes of excessive overeating, how often did you feel disgusted with yourself or guilty afterwards?       7 During the last 3 months, how often did you make yourself vomit as a means to control your weight or shape?

## 2022-03-07 NOTE — ASSESSMENT & PLAN NOTE
Educated patient that Vitamin D is important to reduce inflammation, modulate processes such as cell growth, neuromuscular and immune function, as well as glucose metabolism.   Counseled patient that low Vitamin D levels can leave inflammation unchecked, lead to suboptimal glucose metabolism as well as worsen insulin resistance. All this in turn can make weight loss more challenging.   Counseled patient that the optimal range for Vitamin D levels for weight loss is greater than 50.  Vitamin D supplements can be obtained over the counter at the pharmacy, adequate repletion can be either 2000 IU daily or 10,0000 IU once weekly. Vitamin D is best absorbed when taken with food or a meal that contains fat.  Check vitamin D level to ensure adequate response to therapy.   See assessment and plan under obesity for further details of plan for weight management.

## 2022-03-09 ENCOUNTER — TELEPHONE (OUTPATIENT)
Dept: BARIATRICS/WEIGHT MGMT | Facility: CLINIC | Age: 52
End: 2022-03-09
Payer: COMMERCIAL

## 2022-03-09 NOTE — TELEPHONE ENCOUNTER
Micki: do you mind reaching out to Yany to discuss this. I reviewed this information with her specifically during her visit. Thanks!

## 2022-03-09 NOTE — TELEPHONE ENCOUNTER
Patient want to know the CPT Codes or Procedure Codes for the lab work that was order 03/07/2022. Patient need the information for her insurance to see if it is covered. She does not want a high bill that she received before.

## 2022-03-11 NOTE — TELEPHONE ENCOUNTER
Wegovy has been approved from 3/11/22-10/11/22 Key # [id number]  Has been scanned into Pt's chart  Patient has been called

## 2022-03-16 NOTE — PROGRESS NOTES
"HISTORY OF PRESENT ILLNESS        Yany Mahoney is a 51 y.o. female following up on lifestyle management and weight loss as adjunctive treatment strategies for arthralgias, impaired fasting glucose, dyslipidemia, fatigue, elevated C-reactive protein.      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 Has a dog.  Very busy with kids. Non paying work to hope turns into job.   Mental thing with wt gain is tough.     First visit 3/7/22 with MD Smith.   Goal/Healthy weight: 125 lbs     Data:  4/18/22: wt   .4   Wt Readings from Last 3 Encounters:   04/18/22 83.6 kg (184 lb 6.4 oz)   03/28/22 84.8 kg (186 lb 14.4 oz)   03/22/22 85 kg (187 lb 8 oz)       Prior visit recap:  Last visit with me 3/28/22.  She had to go to 0.25 mg to 0.5 mg quickly due to hunger and cravings.  I went to 1.0 mg weekly and consider topiramate if cravings and hunger persist at the same level of concern.     Medications used to support lifestyle modifications:Wegovy She started 1.0 at end of March  Wegovy side effects:   nausea, vomiting, and diarrhea, abdominal pain, swelling in the neck, hoarseness -No  Supplements used to support lifestyle modification:take omega, vitamin D, MVI, vit c    Interval history:   Chart reviewed since our last visit.    New notes,  labs or studies since last visit: no    Updates/Concerns: She craves salty/crunchy. I advised to get Wegovy dose up and re-evaluate.    She did well at [facility] desser lemon bar/ no wine/ park wrapped scallop  Only one.   \"I am not losing as much as I want too\"      Nutrition:  HHB 5P/4F/3lf/4+hf  Reports the following nutrition successes:  She awakes and drinks 32 ounces of water before coffee.  Bf11:30 -coffee or tea or collegen powder and /or protein shake- cinnamon, pb2 w/ probiotics, water  1-2 L 6 ounces chicken, handful lettuce, cucumbers, apple cider vinegar dressing, water or biwater  Todd shakes left over and will do this as snack - chocolate " "shake-  Drinking 64 oz or more of water daily Meeting protein goals most days of the week Keeping to a fasting schedule of 12 or more hours daily Keeping to meal structure (no spontaneous snacking) did not now about off plan item.      Reports the following nutrition challenges:  Cravings, Eating in 12 hour or less window, Getting 64+ oz water daily, Hitting protein goals most days, 2 or more cups of veggies daily and no naked carbs.     Physical activity:  8K - 22K steps and daily 8k to 12K steps; two cardio week-less with schedule- start doing HIIT at home even 10 minutes daily. One hour walk daily.     Wellness:   Sleeping 8 hours a night on average Feels exhausted and has one ear open.  snores.     Current stress levels are low, Practicing mindfulness most days of the week and Dedicated relaxation time 60 (walking) minutes per day       Current Outpatient Medications on File Prior to Visit   Medication Sig Dispense Refill   • ascorbic acid (VITAMIN C ORAL) Take by mouth.     • biotin 10 mg tablet Take 10 mg by mouth daily.       • cholecalciferol, vitamin D3, 1,000 unit tablet Take by mouth.     • coenzyme Q10 (COQ10) 10 mg capsule Take 10 mg by mouth daily.       • green tea leaf extract (GREEN TEA ORAL) daily.     • Lactobacillus acidophilus (PROBIOTIC ORAL) daily.     • magnesium oxide (MAG-OX) 400 mg (241.3 mg magnesium) tablet Take 400 mg by mouth daily.     • multivitamin (THERAGRAN) tablet Take by mouth.     • omega-3 fatty acids-fish oil 300-1,000 mg capsule Take by mouth.     • ZINC-15 66 mg tablet Take by mouth.       No current facility-administered medications on file prior to visit.          Bee sting [bee venom protein (honey bee)]         PHYSICAL EXAMINATION       Physical Exam   Visit Vitals  /74 (BP Location: Left upper arm, Patient Position: Sitting)   Pulse 70   Temp 36.6 °C (97.9 °F) (Temporal)   Resp 18   Ht 1.626 m (5' 4\")   Wt 83.6 kg (184 lb 6.4 oz)   SpO2 98%   BMI 31.65 " kg/m²       HEENT: No moon face; no hirsutism, no parotid gland enlargement, no dental erosions  NECK: No thyromegaly.  LUNGS: Respirations even and non-labored. CTA b/l  CARDIAC: RRR  ABD: No purple striae   MS: No buffalo hump  SKIN: No acanthosis nigricans. No skin tags.          ASSESSMENT AND PLAN         Impaired fasting glucose  Wegovy will help with your hunger and cravings along side your lifestyle changes you have in place.  Your fasting glucose will improve with time as you continue your plan    Obesity  Status: Craving salty and crunchy.  She did well at [facility].      Challenges: Cravings, restful sleep    Nutrition Goals: Advised on avoiding naked carbs-having just an apple as a snack. Advised on an off plan item weekly (400 calories).     Physical activity goals: Please see EP.  She is walking but not doing CV or resistance and wants to lose adiposity from middle.  Very focused on midsection.  I assured her that as we improve insulin resistance and maximize CV activity this will help the disease of adiposity.      Wellbeing goals: Stress ok. She will work on quality sleep. I discussed journal worries before bed with ways to address the following days and not during your rest.      Medications and supplements: Increase Wegovy to 1.0 mg weekly as you are tolerating this well.  We reviewed Mechanism of action, adverse effects and precautions with the medications prescribed.    I spent 35 minutes on this date of service performing the following activities: obtaining history, performing examination, entering orders, documenting, preparing for visit, obtaining / reviewing records, providing counseling and education, independently reviewing study/studies, communicating results, and coordinating care.      KEMI Miller        Thank you very much for allowing us to participate in the care of your patient. Please do not hesitate to call or email if there are any questions.

## 2022-03-17 ENCOUNTER — TELEPHONE (OUTPATIENT)
Dept: BARIATRICS/WEIGHT MGMT | Facility: CLINIC | Age: 52
End: 2022-03-17
Payer: COMMERCIAL

## 2022-03-17 NOTE — TELEPHONE ENCOUNTER
Yany called with questions about side effects of wegovy, if she should feel nausea, have any constipation or diarrhea is it okay to take something OTC. Where is the primary area of doing the injection. Not sure if she had received the full dose, clarified if she saw the yellow area she did complete first dose correctly.  Areas ok to inject under the skin of your abdomen, thigh, or upper arm.   Given SIDE EFFECTS: Nausea, fullness, constipation. She was told this gets better with time. She felt none of the common side effects, stated she felt great.

## 2022-03-22 ENCOUNTER — CLINICAL SUPPORT (OUTPATIENT)
Dept: BARIATRICS/WEIGHT MGMT | Facility: CLINIC | Age: 52
End: 2022-03-22
Payer: COMMERCIAL

## 2022-03-22 VITALS — DIASTOLIC BLOOD PRESSURE: 80 MMHG | WEIGHT: 187.5 LBS | BODY MASS INDEX: 32.18 KG/M2 | SYSTOLIC BLOOD PRESSURE: 115 MMHG

## 2022-03-22 DIAGNOSIS — R73.01 IMPAIRED FASTING GLUCOSE: ICD-10-CM

## 2022-03-22 DIAGNOSIS — Z71.3 DIETARY COUNSELING: ICD-10-CM

## 2022-03-22 DIAGNOSIS — E66.811 CLASS 1 OBESITY DUE TO EXCESS CALORIES WITH SERIOUS COMORBIDITY AND BODY MASS INDEX (BMI) OF 32.0 TO 32.9 IN ADULT: Primary | ICD-10-CM

## 2022-03-22 DIAGNOSIS — E66.09 CLASS 1 OBESITY DUE TO EXCESS CALORIES WITH SERIOUS COMORBIDITY AND BODY MASS INDEX (BMI) OF 32.0 TO 32.9 IN ADULT: Primary | ICD-10-CM

## 2022-03-22 PROCEDURE — 97802 MEDICAL NUTRITION INDIV IN: CPT | Performed by: DIETITIAN, REGISTERED

## 2022-03-22 NOTE — PATIENT INSTRUCTIONS
[facility]  [facility]  [facility]    Problem List Items Addressed This Visit          Endocrine/Metabolic    Impaired fasting glucose    Obesity - Primary     1. Start tracking on Lose It Magali  2. Add collagen to coffee in the morning (20g protein) + start having breakfast within 90 minutes of finishing exercise (3 meals, 0 snacks)  3. If going to reach for something ask yourself are you hungry or doing this out of habit?               Other    Dietary counseling

## 2022-03-22 NOTE — Clinical Note
Saw Yany today-- she reports she doesn't feel anything from Wegovy (1st dose last week) and is hungry all the time? 2nd dose tomorrow.. looping Emerald in since she sees you next week.

## 2022-03-22 NOTE — ASSESSMENT & PLAN NOTE
1. Start tracking on Lose It Magali  2. Add collagen to coffee in the morning (20g protein) + start having breakfast within 90 minutes of finishing exercise (3 meals, 0 snacks)  3. If going to reach for something ask yourself are you hungry or doing this out of habit?

## 2022-03-22 NOTE — PROGRESS NOTES
"DETAILS OF VISIT     Consulting Service:  [facility] Comprehensive Weight and Wellness Program - Dietitian    Referring Physician: Dr. Cally Smith.    PCP: Shady Otero MD    Reason for Consultation: Medical Weight Management      VISIT DESCRIPTION         Yany Mahoney is a 51 y.o. female here for an initial nutrition evaluation.    Current Wt:   Wt Readings from Last 1 Encounters:   03/22/22 85 kg (187 lb 8 oz)     Ht:   Ht Readings from Last 1 Encounters:   03/07/22 1.626 m (5' 4\")        BMI: Body mass index is 32.18 kg/m².    Pt Goal Wt/Reasonable BW: 125#    Wt Readings from Last 5 Encounters:   03/22/22 85 kg (187 lb 8 oz)   03/07/22 85.5 kg (188 lb 6.4 oz)   04/12/21 78 kg (172 lb)   03/05/21 74.4 kg (164 lb)   09/22/20 80.3 kg (177 lb)     BMI Readings from Last 5 Encounters:   03/22/22 32.18 kg/m²   03/07/22 32.34 kg/m²   04/12/21 29.52 kg/m²   03/05/21 28.15 kg/m²   09/22/20 30.38 kg/m²       I met with Yany Mahoney today for Nutrition Counseling [Z71.3] and dietary education related to the following:    E66.09 - Obese due to excess calories and R73.01- Impaired fasting glucose    Nutrition: Pt seen for initial evaluation. Reports she was reffered by her OB due to gaining weight unexplainably (40# In a few months). Feels she eats well but craves sweets- craves chocolate chip cookie but not ice cream/candy, carbs may be her down fall but not chips/pretzels, enjoys crackers/cheese. Reports she could eat 3 meals but doesn't feel satisfied after she eats.  She felt bloating/stomach distension after eating- started taking probiotic and digestive enzymes.   Started Wegovy last Wednesday but is starving- not constipated, not nauseated.     Recall: wake up 5:30-6am  Coffee while she gets lunches ready (1 c. coffee, 1 tsp monkfruit, 2 tsp chobani vanilla creamer)  Gym 8-8:30 am (may eat an apple before she goes) - arrives home @ 9:30am - if she eats after the gym will be an apple/strawberries, "   Lunch 12-1 pm: lettuce wraps w/ chicken (3.5-4.5 oz chicken), vivar, red grapes   Snack: 1/2 grape fruit with cinnamon   Dinner 7pm: 3.5-4.5 oz chicken/fish/pork, sliced cucumber/tomato/onions with apple cider christine   Tries to do 'dinner and done', if stomach is upset --> reaches for crackers.   Addressed questions re: supplements  Explained she is likely not meeting enough to keep up with metabolic demands, implement breakfast within 90 mins of exercise, add collagen to coffee. Work on reduction of habit snacking.     Plan:  1. Start tracking on Lose It Magali  2. Add collagen to coffee in the morning (20g protein) + start having breakfast within 90 minutes of finishing exercise  3. If going to reach for something ask yourself are you hungry or doing this out of habit?     Hydration Goals Met: At least 64 oz water/day, Camilo stevia soda (3x per week- usually as a pick me up after zoom calls).     Physical Activity: Walks 3-5 miles/day. She was previously going to the gym every day.  She strength trains 3-4x per week either w/ bodypump or on her own at the gym.     Sleep: Feels tired generally, if she gets woken up, will stay awake. Did not discuss In detail.    Emotional Wellness: Not discussed in detail.    Handouts Provided: snack list, [facility] [id number] plans.    Time Spent with Patient for face to face office visit: 90    MEDICATIONS AND ALLERGIES     Current Outpatient Medications on File Prior to Visit   Medication Sig Dispense Refill   • ascorbic acid (VITAMIN C ORAL) Take by mouth.     • biotin 10 mg tablet Take 10 mg by mouth daily.       • cholecalciferol, vitamin D3, 1,000 unit tablet Take by mouth.     • coenzyme Q10 (COQ10) 10 mg capsule Take 10 mg by mouth daily.       • magnesium oxide (MAG-OX) 400 mg (241.3 mg magnesium) tablet Take 400 mg by mouth daily.     • multivitamin (THERAGRAN) tablet Take by mouth.     • omega-3 fatty acids-fish oil 300-1,000 mg capsule Take by mouth.     • WEGOVY 0.25  mg/0.5 mL pen injector Inject 0.5 mL (0.25 mg total) under the skin every (seven) 7 days. 2 mL 0   • ZINC-15 66 mg tablet Take by mouth.       No current facility-administered medications on file prior to visit.         Bee sting [bee venom protein (honey bee)]    CURRENT DIET        Plan: LGI 5972-0730  Meals: 2  Snacks: 0-1     RECOMMENDATIONS   Obesity  1. Start tracking on Lose It Magali  2. Add collagen to coffee in the morning (20g protein) + start having breakfast within 90 minutes of finishing exercise (3 meals, 0 snacks)  3. If going to reach for something ask yourself are you hungry or doing this out of habit?     Anish Morales, MS, RD, LDN  3/22/2022     Thank you very much for allowing us to participate in the care of your patient. Please do not hesitate to call or email if there are any questions

## 2022-03-23 ENCOUNTER — TELEPHONE (OUTPATIENT)
Dept: BARIATRICS/WEIGHT MGMT | Facility: CLINIC | Age: 52
End: 2022-03-23
Payer: COMMERCIAL

## 2022-03-23 DIAGNOSIS — E66.09 CLASS 1 OBESITY DUE TO EXCESS CALORIES WITH SERIOUS COMORBIDITY AND BODY MASS INDEX (BMI) OF 32.0 TO 32.9 IN ADULT: Primary | ICD-10-CM

## 2022-03-23 DIAGNOSIS — E78.5 DYSLIPIDEMIA: ICD-10-CM

## 2022-03-23 DIAGNOSIS — R73.01 IMPAIRED FASTING GLUCOSE: ICD-10-CM

## 2022-03-23 DIAGNOSIS — E66.811 CLASS 1 OBESITY DUE TO EXCESS CALORIES WITH SERIOUS COMORBIDITY AND BODY MASS INDEX (BMI) OF 32.0 TO 32.9 IN ADULT: Primary | ICD-10-CM

## 2022-03-23 RX ORDER — SEMAGLUTIDE 0.5 MG/.5ML
0.5 INJECTION, SOLUTION SUBCUTANEOUS
Qty: 2 ML | Refills: 0 | Status: SHIPPED | OUTPATIENT
Start: 2022-03-23 | End: 2022-03-28

## 2022-03-23 NOTE — TELEPHONE ENCOUNTER
I called Yany. She had no response from 0.25 mg injection.  She will increase to 0.25 mg two injections today and I sent a Rx for Wegovy 0.5 mg weekly.  Follow up with me in one month. Get BMP right before visit.

## 2022-03-23 NOTE — TELEPHONE ENCOUNTER
----- Message from Cally Smith MD sent at 3/23/2022 10:07 AM EDT -----  Thanks for passing this along. Emerald - if she wants to increase to 0.5mg after two doses of 0.25mg I am ok with this!   ----- Message -----  From: Anish Morales RD  Sent: 3/22/2022   3:32 PM EDT  To: Cally Smith MD, KEMI Winter    Saw Yany today-- she reports she doesn't feel anything from Wegovy (1st dose last week) and is hungry all the time? 2nd dose tomorrow.. looping [facility] in since she sees you next week.

## 2022-03-24 NOTE — PROGRESS NOTES
HISTORY OF PRESENT ILLNESS        Yany Mahoney is a 51 y.o. female following up on lifestyle management and weight loss as adjunctive treatment strategies for vitamin D deficiency, colon polyps, dyslipidemia, malignant melanoma, kidney stones, anxiety, sleep apnea, seasonal allergies, impaired fasting glucose.      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 Has a dog.  Very busy with kids. Non paying work to hope turns into job.   Mental thing with wt gain is tough.     Prior visit recap:  First visit 3/7/22 with MD Smith.   Goal/Healthy weight: 125 lbs   CWWP Intake: 03/07/22  Weight: 188.4           BF%: 38.6       REE: 1435  Waist: 36  3/28/2022  Body wt 186.9  BF%38.4%  Ree: 142.8.  She gained wt quickly and it was reported she was on OB related Provera med for few months that increased wt. She states gained wt quickly on this.  She states gained in middle and lower half.  Bruises easily    Medications used to support lifestyle modifications:Wegovy 0.5 mg. Hunger is less. Cravings are present.  Wants crunchy carbs.  Likes cheese.  Eats pizza.    Wegovy side effects:   nausea, vomiting, and diarrhea, abdominal pain, swelling in the neck, hoarseness None.     Interval history:   Chart reviewed since our last visit.    New notes,  labs or studies since last visit: n/a    Updates/Concerns:  She had to go to 0.25 mg to 0.5 mg quickly due to hunger and cravings.      Nutrition:  Nutrition Plan: Hunger Hormone Balance Plan    Calories 1200  4 or more high fiber choices  3 low fiber choices  5 Protein choices  4 Fat choices  Reports the following nutrition successes:  not getting in 64 ounces of water a day; had incident with daughter fainting in school -dehydrated. Using collagen in protein.  Not enjoying coffee with collagen in it.      Reports the following nutrition challenges:  Trouble hitting protein goals, hunger, Cravings and appropriate portions    Physical activity:  not  "discussed    Wellness:   not discussed    Current stress levels are high with daughter's recent ER visit from syncope due to dehydration       Current Outpatient Medications on File Prior to Visit   Medication Sig Dispense Refill   • ascorbic acid (VITAMIN C ORAL) Take by mouth.     • biotin 10 mg tablet Take 10 mg by mouth daily.       • cholecalciferol, vitamin D3, 1,000 unit tablet Take by mouth.     • coenzyme Q10 (COQ10) 10 mg capsule Take 10 mg by mouth daily.       • green tea leaf extract (GREEN TEA ORAL) daily.     • Lactobacillus acidophilus (PROBIOTIC ORAL) daily.     • magnesium oxide (MAG-OX) 400 mg (241.3 mg magnesium) tablet Take 400 mg by mouth daily.     • multivitamin (THERAGRAN) tablet Take by mouth.     • omega-3 fatty acids-fish oil 300-1,000 mg capsule Take by mouth.     • ZINC-15 66 mg tablet Take by mouth.       No current facility-administered medications on file prior to visit.          Bee sting [bee venom protein (honey bee)]         PHYSICAL EXAMINATION      Visit Vitals  /60 (BP Location: Left upper arm, Patient Position: Sitting)   Pulse 70   Temp 36.7 °C (98 °F) (Oral)   Resp 12   Ht 1.626 m (5' 4\")   Wt 84.8 kg (186 lb 14.4 oz)   LMP 08/10/2021   SpO2 97%   BMI 32.08 kg/m²      Body mass index is 32.08 kg/m².  Visit Vitals  /60 (BP Location: Left upper arm, Patient Position: Sitting)   Pulse 70   Temp 36.7 °C (98 °F) (Oral)   Resp 12   Ht 1.626 m (5' 4\")   Wt 84.8 kg (186 lb 14.4 oz)   LMP 08/10/2021   SpO2 97%   BMI 32.08 kg/m²     Body fat 38.7%, REE 1428, waist 34.  Physical Exam  Vitals reviewed.   Constitutional:       Appearance: She is obese.   Abdominal:      Comments: Abdomen protuberant   Psychiatric:      Comments: Focused on weight gain in abdomen.             ASSESSMENT AND PLAN         Obesity  Continue to focus on food type, timing and teamwork.    Continue adequate protein intake to reduce hunger  Continue low insulin inducing foods to balance insulin and " "blood sugar.    Continue adequate cardiovascular activity, resistance training and nonexercise activity daily.    Continue to balance stress with relaxation techniques and aim for adequate sleep nightly.  Pay attention to \"sugar cravings\" perhaps you are not enough enough carbs to keep up with activity level during the day and you need to plan to have a moderate or high glucemic index food with lunch, snack and or dinner to prevent post dinner cravings. Pay attention to if cravings are related to emotions, fatigue, stress etc.   Follow up with RD to discuss what you explore over the coming month!     Medications and supplements: Wegovy 1.0 mg weekly -this was increased from 0.5 mg weekly. Consider adding topiramate with cravings and hunger if persist.    I spent 45 minutes on this date of service performing the following activities: obtaining history, performing examination, entering orders, documenting, preparing for visit, obtaining / reviewing records, providing counseling and education, independently reviewing study/studies and coordinating care.      Dyslipidemia  Continue current lifestyle measures to help improve lipid panel.  You have an order to repeat this in the near future.    Impaired fasting glucose  Wegovy will help with your hunger and cravings along side your lifestyle changes you have in place.  Your fasting glucose will improve with time as you continue your plan.      Thank you very much for allowing us to participate in the care of your patient. Please do not hesitate to call or email if there are any questions.     "

## 2022-03-24 NOTE — ASSESSMENT & PLAN NOTE
"Continue to focus on food type, timing and teamwork.    Continue adequate protein intake to reduce hunger  Continue low insulin inducing foods to balance insulin and blood sugar.    Continue adequate cardiovascular activity, resistance training and nonexercise activity daily.    Continue to balance stress with relaxation techniques and aim for adequate sleep nightly.  Pay attention to \"sugar cravings\" perhaps you are not enough enough carbs to keep up with activity level during the day and you need to plan to have a moderate or high glucemic index food with lunch, snack and or dinner to prevent post dinner cravings. Pay attention to if cravings are related to emotions, fatigue, stress etc.   Follow up with RD to discuss what you explore over the coming month!     Medications and supplements: Wegovy 1.0 mg weekly -this was increased from 0.5 mg weekly. Consider adding topiramate with cravings and hunger if persist.    I spent 45 minutes on this date of service performing the following activities: obtaining history, performing examination, entering orders, documenting, preparing for visit, obtaining / reviewing records, providing counseling and education, independently reviewing study/studies and coordinating care.    "

## 2022-03-27 NOTE — ASSESSMENT & PLAN NOTE
Wegovy will help with your hunger and cravings along side your lifestyle changes you have in place.  Your fasting glucose will improve with time as you continue your plan.

## 2022-03-27 NOTE — ASSESSMENT & PLAN NOTE
Continue current lifestyle measures to help improve lipid panel.  You have an order to repeat this in the near future.

## 2022-03-28 ENCOUNTER — OFFICE VISIT (OUTPATIENT)
Dept: BARIATRICS/WEIGHT MGMT | Facility: CLINIC | Age: 52
End: 2022-03-28
Payer: COMMERCIAL

## 2022-03-28 VITALS
SYSTOLIC BLOOD PRESSURE: 110 MMHG | TEMPERATURE: 98 F | RESPIRATION RATE: 12 BRPM | BODY MASS INDEX: 31.91 KG/M2 | OXYGEN SATURATION: 97 % | WEIGHT: 186.9 LBS | HEART RATE: 70 BPM | DIASTOLIC BLOOD PRESSURE: 60 MMHG | HEIGHT: 64 IN

## 2022-03-28 DIAGNOSIS — E78.5 DYSLIPIDEMIA: ICD-10-CM

## 2022-03-28 DIAGNOSIS — R73.01 IMPAIRED FASTING GLUCOSE: Primary | ICD-10-CM

## 2022-03-28 DIAGNOSIS — E66.811 CLASS 1 OBESITY WITH SERIOUS COMORBIDITY AND BODY MASS INDEX (BMI) OF 32.0 TO 32.9 IN ADULT, UNSPECIFIED OBESITY TYPE: ICD-10-CM

## 2022-03-28 PROCEDURE — 99214 OFFICE O/P EST MOD 30 MIN: CPT | Performed by: NURSE PRACTITIONER

## 2022-03-28 PROCEDURE — 3008F BODY MASS INDEX DOCD: CPT | Performed by: NURSE PRACTITIONER

## 2022-03-28 RX ORDER — SEMAGLUTIDE 1 MG/.5ML
1 INJECTION, SOLUTION SUBCUTANEOUS
Qty: 2 ML | Refills: 0 | Status: SHIPPED | OUTPATIENT
Start: 2022-03-28 | End: 2022-04-18

## 2022-03-28 RX ORDER — SEMAGLUTIDE 1.7 MG/.75ML
1.7 INJECTION, SOLUTION SUBCUTANEOUS
Qty: 3 ML | Refills: 0 | Status: SHIPPED | OUTPATIENT
Start: 2022-03-28 | End: 2022-03-28 | Stop reason: ENTERED-IN-ERROR

## 2022-04-05 ENCOUNTER — TELEPHONE (OUTPATIENT)
Dept: BARIATRICS/WEIGHT MGMT | Facility: CLINIC | Age: 52
End: 2022-04-05
Payer: COMMERCIAL

## 2022-04-05 NOTE — TELEPHONE ENCOUNTER
Pt has been sick and coughing. She has a pain left back under bra strap. Feels like a dull muscle pull and hurts when she twists or turn. Feels a pull when she stretches. Wants to know if it is a side effect of the WeyGovy. Has been taking meds for 3 weeks. Please advise.

## 2022-04-05 NOTE — TELEPHONE ENCOUNTER
No fever. Coughing a lot, hoarseness.  She tried heating pad to back and help.  Pain on left side at bra strap. I advised ibuprofen or tylenol ice alternating with hit and periodic gentle movement.  See PCP if worse.  No SOB or pain with breathing.  No fever or CP.  No GI distress and urinating normally. Drinking 64 ounces.  I told her muscle strain and not Wegovy related.

## 2022-04-13 NOTE — ASSESSMENT & PLAN NOTE
Wegovy will help with your hunger and cravings along side your lifestyle changes you have in place.  Your fasting glucose will improve with time as you continue your plan

## 2022-04-18 ENCOUNTER — OFFICE VISIT (OUTPATIENT)
Dept: BARIATRICS/WEIGHT MGMT | Facility: CLINIC | Age: 52
End: 2022-04-18
Payer: COMMERCIAL

## 2022-04-18 VITALS
BODY MASS INDEX: 31.48 KG/M2 | SYSTOLIC BLOOD PRESSURE: 120 MMHG | WEIGHT: 184.4 LBS | TEMPERATURE: 97.9 F | OXYGEN SATURATION: 98 % | RESPIRATION RATE: 18 BRPM | DIASTOLIC BLOOD PRESSURE: 74 MMHG | HEIGHT: 64 IN | HEART RATE: 70 BPM

## 2022-04-18 DIAGNOSIS — R73.01 IMPAIRED FASTING GLUCOSE: Primary | ICD-10-CM

## 2022-04-18 DIAGNOSIS — E66.811 CLASS 1 OBESITY WITHOUT SERIOUS COMORBIDITY WITH BODY MASS INDEX (BMI) OF 31.0 TO 31.9 IN ADULT, UNSPECIFIED OBESITY TYPE: ICD-10-CM

## 2022-04-18 PROCEDURE — 3008F BODY MASS INDEX DOCD: CPT | Performed by: NURSE PRACTITIONER

## 2022-04-18 PROCEDURE — 99214 OFFICE O/P EST MOD 30 MIN: CPT | Performed by: NURSE PRACTITIONER

## 2022-04-18 RX ORDER — SEMAGLUTIDE 1.7 MG/.75ML
1.7 INJECTION, SOLUTION SUBCUTANEOUS
Qty: 3 ML | Refills: 0 | Status: SHIPPED | OUTPATIENT
Start: 2022-04-18 | End: 2022-05-19 | Stop reason: DRUGHIGH

## 2022-04-18 NOTE — ASSESSMENT & PLAN NOTE
Status: Craving salty and crunchy.  She did well at [facility].      Challenges: Cravings, restful sleep    Nutrition Goals: Advised on avoiding naked carbs-having just an apple as a snack. Advised on an off plan item weekly (400 calories).     Physical activity goals: Please see EP.  She is walking but not doing CV or resistance and wants to lose adiposity from middle.  Very focused on midsection.  I assured her that as we improve insulin resistance and maximize CV activity this will help the disease of adiposity.      Wellbeing goals: Stress ok. She will work on quality sleep. I discussed journal worries before bed with ways to address the following days and not during your rest.      Medications and supplements: Increase Wegovy to 1.0 mg weekly as you are tolerating this well.  We reviewed Mechanism of action, adverse effects and precautions with the medications prescribed.    I spent 35 minutes on this date of service performing the following activities: obtaining history, performing examination, entering orders, documenting, preparing for visit, obtaining / reviewing records, providing counseling and education, independently reviewing study/studies, communicating results, and coordinating care.

## 2022-04-18 NOTE — PATIENT INSTRUCTIONS
Great to see you today, stay well, stay safe, stay active.    Recommend close follow-up with team at [facility]-   1.  Registered Dietician (Anish James or Lakia or iLsseth)   2.  Exercise Physiologist (Lakshmi ESPINOSA).6mwt- MAKE THIS APPOINTMENT      Nutrition:  Continue to keep food journal-Handouts shared -low glycemic or low inflammation diet plan  3 balanced meals at set times , 1-2 healthy snacks.  Add protein to each meal and snacks.  No skipping meals, no eating in between meals,distract self.  2 cups of green veggies for lunch and dinner.  64 oz of fluids /d.    Protein bars: look for varieties that contain 1:1 ratio of carb to protein  - ONE  - Simply Protein bar (Crispy variety)  - Quest bar (Petty variety from [employer] also approved)  -RX bars   -Built bars       1.Continue to plan meals for the week, including the grocery list  --Set reminders on saturdays to focus on planning and prepping as needed.  2. Stress relieving activities 5-10 minutes of yoga deep breathing, stretching daily  3. Adhere to eating window: 12hrs   Increase fluids to 64 oz of fluids /d.    Exercise:  Continue dedicated exercise weekly.  150 to 200minutes/week.    Mindfulness and sleep:  Good sleep hygiene. Melatonin , lavender oil.    KAYLAN options:  1.  [employer]space gvm-[id number]-abrngu code  2.  SOMRYST kaylan  3.  [employer]at[employer]Now kaylan by [employer] and Dr. Janessa Choudhary  Discount code [id number]    Recheck labs- ordered CMP, vit d, a1c -if not done over past 6 months.    Medications:  Finish wegovy 1.0 and start 1.7 weekly   Continue current meds prescription refilled.  Vit d 2000 international units per day  Vitamin B12 daily  Probiotic daily    Follow-up in months.

## 2022-05-19 ENCOUNTER — TELEPHONE (OUTPATIENT)
Dept: BARIATRICS/WEIGHT MGMT | Facility: CLINIC | Age: 52
End: 2022-05-19

## 2022-05-19 RX ORDER — SEMAGLUTIDE 2.4 MG/.75ML
2.4 INJECTION, SOLUTION SUBCUTANEOUS
Qty: 3 ML | Refills: 3 | Status: SHIPPED | OUTPATIENT
Start: 2022-05-19 | End: 2022-06-09 | Stop reason: SDUPTHER

## 2022-05-19 NOTE — TELEPHONE ENCOUNTER
Caller called state she is ready for the next dose of the wegovy which should be 2.4 mg   [facility] [address] [employer] [address] [place] [address]   Phone:  [phone]  Fax:  [phone]

## 2022-06-01 ENCOUNTER — TRANSCRIBE ORDERS (OUTPATIENT)
Dept: SCHEDULING | Age: 52
End: 2022-06-01

## 2022-06-01 DIAGNOSIS — Z80.3 FAMILY HISTORY OF MALIGNANT NEOPLASM OF BREAST: Primary | ICD-10-CM

## 2022-06-02 ENCOUNTER — APPOINTMENT (OUTPATIENT)
Dept: URBAN - METROPOLITAN AREA CLINIC 200 | Age: 52
Setting detail: DERMATOLOGY
End: 2022-06-02

## 2022-06-02 DIAGNOSIS — Z80.8 FAMILY HISTORY OF MALIGNANT NEOPLASM OF OTHER ORGANS OR SYSTEMS: ICD-10-CM

## 2022-06-02 DIAGNOSIS — Z71.89 OTHER SPECIFIED COUNSELING: ICD-10-CM

## 2022-06-02 DIAGNOSIS — T63.48 TOXIC EFFECT OF VENOM OF OTHER ARTHROPOD: ICD-10-CM

## 2022-06-02 DIAGNOSIS — Z85.820 PERSONAL HISTORY OF MALIGNANT MELANOMA OF SKIN: ICD-10-CM

## 2022-06-02 DIAGNOSIS — L57.8 OTHER SKIN CHANGES DUE TO CHRONIC EXPOSURE TO NONIONIZING RADIATION: ICD-10-CM

## 2022-06-02 DIAGNOSIS — Z11.52 ENCOUNTER FOR SCREENING FOR COVID-19: ICD-10-CM

## 2022-06-02 PROBLEM — T63.481A TOXIC EFFECT OF VENOM OF OTHER ARTHROPOD, ACCIDENTAL (UNINTENTIONAL), INITIAL ENCOUNTER: Status: ACTIVE | Noted: 2022-06-02

## 2022-06-02 PROCEDURE — OTHER REASSURANCE: OTHER

## 2022-06-02 PROCEDURE — 99213 OFFICE O/P EST LOW 20 MIN: CPT

## 2022-06-02 PROCEDURE — OTHER SCREENING FOR COVID-19: OTHER

## 2022-06-02 PROCEDURE — OTHER SUNSCREEN RECOMMENDATIONS: OTHER

## 2022-06-02 PROCEDURE — OTHER COUNSELING: OTHER

## 2022-06-02 ASSESSMENT — LOCATION DETAILED DESCRIPTION DERM
LOCATION DETAILED: RIGHT MEDIAL UPPER BACK
LOCATION DETAILED: RIGHT SUPERIOR MEDIAL UPPER BACK
LOCATION DETAILED: PERIUMBILICAL SKIN
LOCATION DETAILED: LEFT MEDIAL BREAST 11-12:00 REGION
LOCATION DETAILED: EPIGASTRIC SKIN
LOCATION DETAILED: LEFT MEDIAL BREAST 10-11:00 REGION
LOCATION DETAILED: INFERIOR MID FOREHEAD

## 2022-06-02 ASSESSMENT — LOCATION SIMPLE DESCRIPTION DERM
LOCATION SIMPLE: RIGHT UPPER BACK
LOCATION SIMPLE: ABDOMEN
LOCATION SIMPLE: LEFT BREAST
LOCATION SIMPLE: INFERIOR FOREHEAD

## 2022-06-02 ASSESSMENT — LOCATION ZONE DERM
LOCATION ZONE: FACE
LOCATION ZONE: TRUNK

## 2022-06-02 NOTE — PROCEDURE: COUNSELING
Patient Specific Counseling (Will Not Stick From Patient To Patient): I recommended the following:\\nSunscreen
Detail Level: Detailed
Detail Level: Generalized
Quality 137: Melanoma: Continuity Of Care - Recall System: Patient information entered into a recall system that includes: target date for the next exam specified AND a process to follow up with patients regarding missed or unscheduled appointments

## 2022-06-03 ENCOUNTER — TELEPHONE (OUTPATIENT)
Dept: BARIATRICS/WEIGHT MGMT | Facility: CLINIC | Age: 52
End: 2022-06-03
Payer: COMMERCIAL

## 2022-06-03 DIAGNOSIS — E66.811 CLASS 1 OBESITY WITHOUT SERIOUS COMORBIDITY WITH BODY MASS INDEX (BMI) OF 31.0 TO 31.9 IN ADULT, UNSPECIFIED OBESITY TYPE: Primary | ICD-10-CM

## 2022-06-03 RX ORDER — PHENTERMINE HYDROCHLORIDE 15 MG/1
15 CAPSULE ORAL EVERY MORNING
Qty: 30 CAPSULE | Refills: 0 | Status: SHIPPED | OUTPATIENT
Start: 2022-06-03 | End: 2022-06-17

## 2022-06-03 RX ORDER — TOPIRAMATE 25 MG/1
TABLET ORAL
Qty: 60 TABLET | Refills: 1 | Status: SHIPPED | OUTPATIENT
Start: 2022-06-03 | End: 2022-06-03

## 2022-06-03 NOTE — TELEPHONE ENCOUNTER
I spoke with Yany. She is snacking between meals and craves sweets.  She is asking for something in addition to Wegovy 2.4 mg.  I advised mixing up workouts because our bodies become efficient at what we do and asked if she is doing resistance. She said she has been mixing it up but not able to lift weights due to fatigue.  She thinks wegovy causes her muscles to be fatigued. I advised just use own body weight or water bottles.    I also asked if she was tracking her food and she said she is but she can miss dinner with kids swimming at night.  She is taking a protein bar or has cottage cheese only. I advised add fat and fiber with this.  I recommended considering the REEVUE. She meets with Dr. Smith next week and will read our sheet and discuss with her.  She states she was on phentermine in past and did well on 37.5 mg tab.   I reviewed bupropion and naltrexone with her as well.  She can't take topiramate w/ hx kidney stones.  She would like phentermine. I wanted to start at 8 mg and she wanted to start at 37.5 mg and I advised we start max at 15 mg. I told her she may not need this high dose with being on Wegovy and they work on different pathways and may do great with this dose.  I reviewed SE/AE and check BP/HR monthly.    I sent in 15 mg daily a.m. w/ bkfast to pharmacy.    KEMI Miller

## 2022-06-03 NOTE — TELEPHONE ENCOUNTER
Patient called having cravings and hunger. We discussed topiramate initiation if this persisted. I called and left a vm discussing this.  I sent Rx to pharmacy.  KEMI Miller

## 2022-06-08 ENCOUNTER — HOSPITAL ENCOUNTER (OUTPATIENT)
Dept: RADIOLOGY | Facility: HOSPITAL | Age: 52
Discharge: HOME | End: 2022-06-08
Attending: OBSTETRICS & GYNECOLOGY
Payer: COMMERCIAL

## 2022-06-08 DIAGNOSIS — Z80.3 FAMILY HISTORY OF MALIGNANT NEOPLASM OF BREAST: ICD-10-CM

## 2022-06-08 PROCEDURE — G0279 TOMOSYNTHESIS, MAMMO: HCPCS

## 2022-06-08 ASSESSMENT — ENCOUNTER SYMPTOMS
APPETITE CHANGE: 1
PSYCHIATRIC NEGATIVE: 1
HEMATOLOGIC/LYMPHATIC NEGATIVE: 1
ENDOCRINE NEGATIVE: 1
RESPIRATORY NEGATIVE: 1
ALLERGIC/IMMUNOLOGIC NEGATIVE: 1
MUSCULOSKELETAL NEGATIVE: 1
EYES NEGATIVE: 1
CARDIOVASCULAR NEGATIVE: 1
NEUROLOGICAL NEGATIVE: 1
GASTROINTESTINAL NEGATIVE: 1

## 2022-06-08 NOTE — PROGRESS NOTES
COMPREHENSIVE WEIGHT AND WELLNESS FOLLOW UP VISIT     HISTORY OF PRESENT ILLNESS - ASSESSMENT AND PLAN      CC:   Chief Complaint   Patient presents with   • Weight Management     Yany Mahoney is a 51 y.o. female following up on lifestyle management and weight loss as adjunctive treatment strategies for vitamin D deficiency, colon polyps, dyslipidemia, malignant melanoma, kidney stones, anxiety, sleep apnea, seasonal allergies, impaired fasting glucose and obesity.      Body Composition Trend  Date Weight (Pounds) Body Fat %  Waist (Inches)   03/07/22 188.4 38.6 36   06/09/22 173.7 37 34     Medications used to support lifestyle modifications: Wegovy 2.4mg weekly, Phentermine 15mg daily, just started Tuesday for one dose, taking between 10-11am, feels like this is helping her appetite significantly   Supplements used to support lifestyle modifications: Vitamin C, Biotin, Vitamin D, CoQ10, Green Tea Extract, Probiotic, Magenesium, Multivitamin Omega 3 Fatty Acid     Interval history:   New notes, labs or studies reviewed since last visit:   03/22 VICENTE Oconnell  03/28 MOHAN Mcpherson  04/18 MOHAN Mcpherson  06/03 Call with [facility]  I spoke with Yany. She is snacking between meals and craves sweets.  She is asking for something in addition to Wegovy 2.4 mg.  I advised mixing up workouts because our bodies become efficient at what we do and asked if she is doing resistance. She said she has been mixing it up but not able to lift weights due to fatigue.  She thinks wegovy causes her muscles to be fatigued. I advised just use own body weight or water bottles.    I also asked if she was tracking her food and she said she is but she can miss dinner with kids swimming at night.  She is taking a protein bar or has cottage cheese only. I advised add fat and fiber with this.  I recommended considering the REEVUE. She meets with Dr. Smith next week and will read our sheet and discuss with her.  She states she was on phentermine in past  and did well on 37.5 mg tab.   I reviewed bupropion and naltrexone with her as well.  She can't take topiramate w/ hx kidney stones.  She would like phentermine. I wanted to start at 8 mg and she wanted to start at 37.5 mg and I advised we start max at 15 mg. I told her she may not need this high dose with being on Wegovy and they work on different pathways and may do great with this dose.  I reviewed SE/AE and check BP/HR monthly.    I sent in 15 mg daily a.m. w/ bkfast to pharmacy.    Today's update: Recently wanted to start on Phentermine to help control both hunger and carvings, takes Phentermine between 10-11am. Recently she has been having hunger in the afternoon before dinner which is followed by a sweet craving and then also having sweet cravings after dinner. Craving more water on Wegovy, really cut down on coffee.     24 Hour Recall  7:30-8am: 1/2 cup cottage cheese with berries, sometimes 1/2 grapefruit with cinnamon, sometimes saves this for a mid morning snack, sometimes protein shake with whey protein, cinnamon, PB powder, , then sometimes wont eat until 11am or 2pm, dinner is 5:30-7pm. Tries to not eat after 7pm. Eats before she leaves for baseball practice as often as she can.     Status of the weight related conditions, relevant history, and new concerns:  Assessment   Problem List Items Addressed This Visit        Endocrine/Metabolic    Impaired fasting glucose     We discussed today that without change in lifestyle, impaired fasting glucose can progress to prediabetes and then type 2 diabetes over time.  Continuing to have the same eating plan, level of nutrition, and lifestyle will not result in stable blood sugars over time.  Doing the same thing will result in worsening of the condition.  The only way to stop progression or produce remission of insulin resistance is to eat a whole foods based diet, low in simple carbohydrates, get adequate physical activity, manage stress, and eat only in  response to hunger.  Certain medications are also available to help to improve insulin sensitivity.   See assessment and plan under obesity for further details of plan for weight management.                Infectious/Inflammatory    Lyme disease       Other    Overweight with body mass index (BMI) of 29 to 29.9 in adult - Primary     Continue Wegovy 2.4mg weekly  Continue Phentermine 15mg daily between 10-11am for now, we discussed that she is not eating enough calories and appropriate balance of protein, fat and fiber at breakfast and lunch and that currently she is using Phentermine to block a natural hunger response, we will not continue this medication long term.   She should figure out a pattern of food intake that respects her natural hunger cues and ensure she is eating protein, fat fiber at breakfast and lunch.   Breakfast: 1.5 cups of cottage cheese, 1.5tbs melo seeds and blueberries   Snack: Protein shake with almonds and cucumbers/cherry tomatoes OR protein shake, cucumbers with cheese   AVOID EATING FRUIT BY ITSELF, ALWAYS PAIR WITH A FAT  Patient is taking medication as prescribed and notices good effect in appetite reduction. Denies insomnia, palpitation, dry mouth, constipation, abdominal pain, irritability or mood disturbance. Keeps medication in a safe place. Denies misuse or diversion. [place] PDMP site accessed and assessed. Patient information reviewed.  No clinical concerns noted.                           Current Outpatient Medications on File Prior to Visit   Medication Sig Dispense Refill   • ascorbic acid (VITAMIN C ORAL) Take by mouth.     • biotin 10 mg tablet Take 10 mg by mouth daily.       • cholecalciferol, vitamin D3, 1,000 unit tablet Take by mouth.     • coenzyme Q10 (COQ10) 10 mg capsule Take 10 mg by mouth daily.       • green tea leaf extract (GREEN TEA ORAL) daily.     • Lactobacillus acidophilus (PROBIOTIC ORAL) daily.     • magnesium oxide (MAG-OX) 400 mg (241.3 mg magnesium)  "tablet Take 400 mg by mouth daily.     • multivitamin (THERAGRAN) tablet Take by mouth.     • omega-3 fatty acids-fish oil 300-1,000 mg capsule Take by mouth.     • phentermine 15 mg capsule Take 1 capsule (15 mg total) by mouth every morning. 30 capsule 0   • ZINC-15 66 mg tablet Take by mouth.       No current facility-administered medications on file prior to visit.          Bee sting [bee venom protein (honey bee)]       Review of Systems   Constitutional: Positive for appetite change.   HENT: Negative.    Eyes: Negative.    Respiratory: Negative.    Cardiovascular: Negative.    Gastrointestinal: Negative.    Endocrine: Negative.    Genitourinary: Negative.    Musculoskeletal: Negative.    Skin: Negative.    Allergic/Immunologic: Negative.    Neurological: Negative.    Hematological: Negative.    Psychiatric/Behavioral: Negative.         PHYSICAL EXAMINATION      Visit Vitals  /80 (BP Location: Left upper arm, Patient Position: Sitting)   Pulse 88   Resp 18   Ht 1.626 m (5' 4\")   Wt 78.8 kg (173 lb 11.2 oz)   SpO2 98%   BMI 29.82 kg/m²      Body mass index is 29.82 kg/m².    BP Readings from Last 3 Encounters:   06/09/22 108/80   04/18/22 120/74   03/28/22 110/60     Wt Readings from Last 3 Encounters:   06/09/22 78.8 kg (173 lb 11.2 oz)   04/18/22 83.6 kg (184 lb 6.4 oz)   03/28/22 84.8 kg (186 lb 14.4 oz)       Physical Exam  Vitals signs reviewed.   Constitutional:       Appearance: Normal appearance. Well-developed.   HENT:      Head: Normocephalic and atraumatic.      Right Ear: External ear normal.      Left Ear: External ear normal.   Eyes:      General: Lids are normal.      Extraocular Movements: Extraocular movements intact.      Conjunctiva/sclera: Conjunctivae normal.   Pulmonary:      Effort: Pulmonary effort is normal.   Neurological:      General: No focal deficit present.      Mental Status: Alert and oriented to person, place, and time. Mental status is at baseline.   Psychiatric:         " Attention and Perception: Attention and perception normal.         Mood and Affect: Mood and affect normal.         Speech: Speech normal.         Behavior: Behavior normal. Behavior is cooperative.         Thought Content: Thought content normal.         Cognition and Memory: Cognition and memory normal.         Judgment: Judgment normal.        TIME      I spent 30 minutes on this date of service performing the following activities: obtaining history, performing examination, entering orders, documenting, preparing for visit, obtaining / reviewing records and providing counseling and education.    Prior to this visit, if applicable, I reviewed all primary care, specialist, dietician and exercise physiology office visits that the patient has attended since his or her most recent visit with our program. Additionally, if applicable, I reviewed all recent labwork, imaging studies or procedures which the patient may have completed since his or her most recent visit in order to prepare for today's visit.     Cally Smith MD  6/9/2022

## 2022-06-09 ENCOUNTER — OFFICE VISIT (OUTPATIENT)
Dept: BARIATRICS/WEIGHT MGMT | Facility: CLINIC | Age: 52
End: 2022-06-09
Payer: COMMERCIAL

## 2022-06-09 ENCOUNTER — CLINICAL SUPPORT (OUTPATIENT)
Dept: BARIATRICS/WEIGHT MGMT | Facility: CLINIC | Age: 52
End: 2022-06-09

## 2022-06-09 VITALS
DIASTOLIC BLOOD PRESSURE: 80 MMHG | HEIGHT: 64 IN | SYSTOLIC BLOOD PRESSURE: 108 MMHG | HEART RATE: 88 BPM | RESPIRATION RATE: 18 BRPM | BODY MASS INDEX: 29.65 KG/M2 | OXYGEN SATURATION: 98 % | WEIGHT: 173.7 LBS

## 2022-06-09 DIAGNOSIS — E66.3 OVERWEIGHT WITH BODY MASS INDEX (BMI) OF 29 TO 29.9 IN ADULT: ICD-10-CM

## 2022-06-09 DIAGNOSIS — A69.20 LYME DISEASE: ICD-10-CM

## 2022-06-09 DIAGNOSIS — R73.01 IMPAIRED FASTING GLUCOSE: Primary | ICD-10-CM

## 2022-06-09 PROCEDURE — 3008F BODY MASS INDEX DOCD: CPT | Performed by: STUDENT IN AN ORGANIZED HEALTH CARE EDUCATION/TRAINING PROGRAM

## 2022-06-09 PROCEDURE — CWWP26 PB PROTEIN BAR BX: Performed by: STUDENT IN AN ORGANIZED HEALTH CARE EDUCATION/TRAINING PROGRAM

## 2022-06-09 PROCEDURE — 99214 OFFICE O/P EST MOD 30 MIN: CPT | Performed by: STUDENT IN AN ORGANIZED HEALTH CARE EDUCATION/TRAINING PROGRAM

## 2022-06-09 RX ORDER — SEMAGLUTIDE 2.4 MG/.75ML
2.4 INJECTION, SOLUTION SUBCUTANEOUS
Qty: 3 ML | Refills: 3 | Status: SHIPPED | OUTPATIENT
Start: 2022-06-09 | End: 2022-07-13 | Stop reason: SDUPTHER

## 2022-06-09 NOTE — PATIENT INSTRUCTIONS
Continue Wegovy 2.4mg weekly  Continue Phentermine 15mg daily between 10-11am for now, we discussed that she is not eating enough calories and appropriate balance of protein, fat and fiber at breakfast and lunch and that currently she is using Phentermine to block a natural hunger response, we will not continue this medication long term.   She should figure out a pattern of food intake that respects her natural hunger cues and ensure she is eating protein, fat fiber at breakfast and lunch.   Breakfast: 1.5 cups of cottage cheese, 1.5tbs melo seeds and blueberries   Snack: Protein shake with almonds and cucumbers/cherry tomatoes OR protein shake, cucumbers with cheese   AVOID EATING FRUIT BY ITSELF, ALWAYS PAIR WITH A FAT  Patient is taking medication as prescribed and notices good effect in appetite reduction. Denies insomnia, palpitation, dry mouth, constipation, abdominal pain, irritability or mood disturbance. Keeps medication in a safe place. Denies misuse or diversion. [place]MP site accessed and assessed. Patient information reviewed.  No clinical concerns noted.

## 2022-06-16 NOTE — PROGRESS NOTES
HISTORY OF PRESENT ILLNESS        Yany Mahoney is a 51 y.o. female following up on lifestyle management and weight loss as adjunctive treatment strategies for arthralgias, impaired fasting glucose, dyslipidemia, fatigue, elevated C-reactive protein.       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 Has a dog.  Very busy with kids. Non paying work to hope turns into job.   Mental thing with wt gain is tough.      First visit 3/7/22 with MD Smith. Wt 188 lbs.  Goal/Healthy weight: 125 lbs   Nutrition:  HHB 5P/4F/3lf/4+hf    Interval history:   Chart review done since last visit.  03/22 VICENTE Oconnell  03/28 NP [facility]  04/18 NP [facility]  06/03 Call with me   6/9/22 MD Smith:   Continue Wegovy 2.4mg weekly  Continue Phentermine 15mg daily between 10-11am for now, we discussed that she is not eating enough calories and appropriate balance of protein, fat and fiber at breakfast and lunch and that currently she is using Phentermine to block a natural hunger response, we will not continue this medication long term.   She should figure out a pattern of food intake that respects her natural hunger cues and ensure she is eating protein, fat fiber at breakfast and lunch.     TODAY'S VISIT:   DATA:  6/17 Wt 171.3-16 pounds down.    Wt Readings from Last 5 Encounters:   06/17/22 77.7 kg (171 lb 4.8 oz)   06/09/22 78.8 kg (173 lb 11.2 oz)   04/18/22 83.6 kg (184 lb 6.4 oz)   03/28/22 84.8 kg (186 lb 14.4 oz)   03/22/22 85 kg (187 lb 8 oz)       Concerns or questions: Still craving sweets.    Sports are causing her not to eat a full dinner-always getting a protein, missing tracking.  She is getting 6-8 ounces of protein on scale, rare 4 ounces.   24 diet recall:  10 AM protein shake whey with cinnamon, melo seeds, collagen, PB powder (PB2 with probiotics), almond milk unsweetened  1:30 P cottage cheese 1 c, 1/2 c blue berries, 1 tbsp melo seeds  4 P verenice chips one bag  6:30 P 6 ounces baked chicken in wiped off  onion soup w/ little Himalayan salt, little barbecue salt.  Total 80 ounces water.   Just water so far this a.m.        Medications used to support lifestyle modifications:Phentermine and Wegovy  1. Wegovy 2.4 mg weekly- no side effects.    2. Phentermine 15 mg -BP and HR satisfactory     Nutrition:  hunger, Cravings, Eating in 12 hour or less window, Getting 64+ oz water daily, Hitting protein goals most days, 2 or more cups of veggies daily and Planning ahead  Craves a cookie.      Physical activity:  Walking one hour consistently. Thinking of adding HIIT. We discussed adding in 10 minute intervals 2 times/week.       Sleep:   Sleeping 9-10 P to 5:30-6 A- disrupted by son sometimes- awoke with rash, then he had a nosebleed. hours a night on average and Sleep is not restful    Stress:   stable and Contributing factors include two kids than are young.       PAST MEDICAL History        Past Medical History:   Diagnosis Date   • Anxiety    • Colon polyp 09/2019    1 hyperplastic polyp [id number]   • Diabetes (CMS/HCC)     only associated with pregnacy   • Elevated C-reactive protein (CRP)    • Fibrocystic breast    • Kidney stone 2000   • Melanoma (CMS/HCC) 2018    upper back between scapula   • Screening for breast cancer     annual mammograms, Type C density   • Screening for colon cancer    • Seasonal allergies        MEDICATIONS        Current Outpatient Medications on File Prior to Visit   Medication Sig Dispense Refill   • ascorbic acid (VITAMIN C ORAL) Take by mouth.     • biotin 10 mg tablet Take 10 mg by mouth daily.       • cholecalciferol, vitamin D3, 1,000 unit tablet Take by mouth.     • coenzyme Q10 (COQ10) 10 mg capsule Take 10 mg by mouth daily.       • green tea leaf extract (GREEN TEA ORAL) daily.     • Lactobacillus acidophilus (PROBIOTIC ORAL) daily.     • magnesium oxide (MAG-OX) 400 mg (241.3 mg magnesium) tablet Take 400 mg by mouth daily.     • multivitamin (THERAGRAN) tablet Take by mouth.    "  • omega-3 fatty acids-fish oil 300-1,000 mg capsule Take by mouth.     • WEGOVY 2.4 mg/0.75 mL pen injector Inject 0.75 mL (2.4 mg total) under the skin every (seven) 7 days. 3 mL 3   • ZINC-15 66 mg tablet Take by mouth.       No current facility-administered medications on file prior to visit.       ALLERGIES        Bee sting [bee venom protein (honey bee)]           REVIEW OF SYSTEMS      Review of Systems    Review of Systems   Respiratory: Negative.    Cardiovascular: Negative.    Gastrointestinal: Negative.    Neurological: Negative.    All other systems reviewed and are negative.       PHYSICAL EXAMINATION      BP Readings from Last 3 Encounters:   06/17/22 110/70   06/09/22 108/80   04/18/22 120/74     Wt Readings from Last 3 Encounters:   06/17/22 77.7 kg (171 lb 4.8 oz)   06/09/22 78.8 kg (173 lb 11.2 oz)   04/18/22 83.6 kg (184 lb 6.4 oz)       Physical Exam  Visit Vitals  /70 (BP Location: Left upper arm, Patient Position: Sitting)   Pulse 78   Temp 36.3 °C (97.4 °F) (Temporal)   Resp 16   Ht 1.626 m (5' 4\")   Wt 77.7 kg (171 lb 4.8 oz)   LMP 05/15/2022   SpO2 98%   BMI 29.40 kg/m²   No thyromegaly  Lungs CTA  Cardiac RRR  No Abdominal tenderness  Neuro A&O X 3.      LABS / IMAGING / EKG        Reviewed the following Labs:    Lab Results   Component Value Date    HGBA1C 5.4 09/23/2020    HGBA1C 5.2 09/19/2019    HGBA1C 5.0 10/02/2018     Lab Results   Component Value Date    CHOL 206 (H) 09/23/2020    CHOL 196 09/19/2019    CHOL 186 10/02/2018     Lab Results   Component Value Date    HDL 99 09/23/2020     09/19/2019    HDL 92 10/02/2018     Lab Results   Component Value Date    LDLCALC 98 09/23/2020    LDLCALC 84 09/19/2019    LDLCALC 83 10/02/2018     Lab Results   Component Value Date    TRIG 51 09/23/2020    TRIG 47 09/19/2019    TRIG 56 10/02/2018     No results found for: CHOLHDL      ASSESSMENT AND PLAN         Overweight with body mass index (BMI) of 29 to 29.9 in " "adult  Status:Still craving sweets.    Sports are causing her not to eat a full dinner-always getting a protein, missing tracking.  She is getting 6-8 ounces of protein on scale, rare 4 ounces.   24 diet recall:  10 AM protein shake whey with cinnamon, melo seeds, collagen, PB powder (PB2 with probiotics), almond milk unsweetened  1:30 P cottage cheese 1 c, 1/2 c blue berries, 1 tbsp melo seeds  4 P verenice chips one bag  6:30 P 6 ounces baked chicken in wiped off onion soup w/ little Himalayan salt, little barbecue salt.  Total 80 ounces water.   Just water so far this a.m.      Challenges: struggling with cravings-especially cookies; having hunger and 24 hour diet recall was satisfactory.  We discussed that excess weight is a disease of neurobehavioral dysfunction and that changing behaviors is important in preventing weight regain.  Obesity is a neurobehavioral disease. Habits are ingrained neurological patterns that allow or brains to conserve energy.   We make more than 200 decisions each day about food, most of which are subconscious.  Our lives are perfectly designed to continue our current habits.  The environment, advertising, sight/smell,stress, emotions, time of day, social factors all affect behavior.    Lasting change come from new habit formation.  This takes time and consistent effort.       Nutrition Goals: Continue to get macros in and tracking is important for success.     Physical activity goals: Add HIIT to your walking routine.  The [employer] [place] video workout series is a good one- for HIIT workouts of 10-15 minutes.    Wellbeing goals: Advised to practice mindfulness with her busy schedule and always being \"on\".  This will help turn off her parasympathetic \"fight or flight\" system.      Medications and supplements: She is requesting increase in Phentermine and is aware this is short term and work on behavioral changes for lasting results.    Phentermine increase to 30 mg daily  Continue Wegovy 2.4 " mg weekly.     I spent 35 minutes on this date of service performing the following activities: obtaining history, entering orders, documenting, preparing for visit, obtaining / reviewing records, providing counseling and education, independently reviewing study/studies, and coordinating care.      Impaired fasting glucose  We discussed that the focus of weight management is to improve insulin resistance through lifestyle changes and by identifying and correcting underlying contributing medical conditions.   Meds continued or initiated: Wegovy 2.4 mg weekly  See assessment and plan under obesity for further details of plan for weight management.        I spent 35 minutes on this date of service performing the following activities: obtaining history, entering orders, documenting, preparing for visit, obtaining / reviewing records, providing counseling and education, independently reviewing study/studies, and coordinating care.     KEMI Miller    Thank you very much for allowing us to participate in the care of your patient. Please do not hesitate to call or email if there are any questions.

## 2022-06-17 ENCOUNTER — OFFICE VISIT (OUTPATIENT)
Dept: BARIATRICS/WEIGHT MGMT | Facility: CLINIC | Age: 52
End: 2022-06-17
Payer: COMMERCIAL

## 2022-06-17 VITALS
DIASTOLIC BLOOD PRESSURE: 70 MMHG | TEMPERATURE: 97.4 F | WEIGHT: 171.3 LBS | HEART RATE: 78 BPM | OXYGEN SATURATION: 98 % | HEIGHT: 64 IN | BODY MASS INDEX: 29.24 KG/M2 | SYSTOLIC BLOOD PRESSURE: 110 MMHG | RESPIRATION RATE: 16 BRPM

## 2022-06-17 DIAGNOSIS — E66.3 OVERWEIGHT WITH BODY MASS INDEX (BMI) OF 29 TO 29.9 IN ADULT: ICD-10-CM

## 2022-06-17 DIAGNOSIS — R73.01 IMPAIRED FASTING GLUCOSE: Primary | ICD-10-CM

## 2022-06-17 PROCEDURE — 3008F BODY MASS INDEX DOCD: CPT | Performed by: NURSE PRACTITIONER

## 2022-06-17 PROCEDURE — 99214 OFFICE O/P EST MOD 30 MIN: CPT | Performed by: NURSE PRACTITIONER

## 2022-06-17 RX ORDER — PHENTERMINE HYDROCHLORIDE 30 MG/1
30 CAPSULE ORAL EVERY MORNING
Qty: 30 CAPSULE | Refills: 0 | Status: SHIPPED | OUTPATIENT
Start: 2022-06-17 | End: 2022-08-08 | Stop reason: SDUPTHER

## 2022-06-17 ASSESSMENT — ENCOUNTER SYMPTOMS
CARDIOVASCULAR NEGATIVE: 1
NEUROLOGICAL NEGATIVE: 1
RESPIRATORY NEGATIVE: 1
GASTROINTESTINAL NEGATIVE: 1

## 2022-06-17 NOTE — ASSESSMENT & PLAN NOTE
"Status:Still craving sweets.    Sports are causing her not to eat a full dinner-always getting a protein, missing tracking.  She is getting 6-8 ounces of protein on scale, rare 4 ounces.   24 diet recall:  10 AM protein shake whey with cinnamon, melo seeds, collagen, PB powder (PB2 with probiotics), almond milk unsweetened  1:30 P cottage cheese 1 c, 1/2 c blue berries, 1 tbsp melo seeds  4 P verenice chips one bag  6:30 P 6 ounces baked chicken in wiped off onion soup w/ little Himalayan salt, little barbecue salt.  Total 80 ounces water.   Just water so far this a.m.      Challenges: struggling with cravings-especially cookies; having hunger and 24 hour diet recall was satisfactory.  We discussed that excess weight is a disease of neurobehavioral dysfunction and that changing behaviors is important in preventing weight regain.  Obesity is a neurobehavioral disease. Habits are ingrained neurological patterns that allow or brains to conserve energy.   We make more than 200 decisions each day about food, most of which are subconscious.  Our lives are perfectly designed to continue our current habits.  The environment, advertising, sight/smell,stress, emotions, time of day, social factors all affect behavior.    Lasting change come from new habit formation.  This takes time and consistent effort.       Nutrition Goals: Continue to get macros in and tracking is important for success.     Physical activity goals: Add HIIT to your walking routine.  The [employer] [place] video workout series is a good one- for HIIT workouts of 10-15 minutes.    Wellbeing goals: Advised to practice mindfulness with her busy schedule and always being \"on\".  This will help turn off her parasympathetic \"fight or flight\" system.      Medications and supplements: She is requesting increase in Phentermine and is aware this is short term and work on behavioral changes for lasting results.    Phentermine increase to 30 mg daily  Continue Wegovy 2.4 mg " weekly.     I spent 35 minutes on this date of service performing the following activities: obtaining history, entering orders, documenting, preparing for visit, obtaining / reviewing records, providing counseling and education, independently reviewing study/studies, and coordinating care.

## 2022-06-17 NOTE — PATIENT INSTRUCTIONS
Obesity is a neurobehavioral disease. Habits are ingrained neurological patterns that allow or brains to conserve energy.   We make more than 200 decisions each day about food, most of which are subconscious.  Our lives are perfectly designed to continue our current habits.  The environment, advertising, sight/smell,stress, emotions, time of day, social factors all affect behavior.    Lasting change come from new habit formation.  This takes time and consistent effort.      The 3 Ps:  Pause, process, postpone or pass then.... have a cold glass of water!   Come up with a list of 3-5 activities and keep the list where you keep their snacks, high risk foods and then pick an activity instead.... clean out a junk drawer, phone a friend, take a walk etc...

## 2022-06-17 NOTE — ASSESSMENT & PLAN NOTE
We discussed that the focus of weight management is to improve insulin resistance through lifestyle changes and by identifying and correcting underlying contributing medical conditions.   Meds continued or initiated: Wegovy 2.4 mg weekly  See assessment and plan under obesity for further details of plan for weight management.

## 2022-07-13 ENCOUNTER — TELEPHONE (OUTPATIENT)
Dept: BARIATRICS/WEIGHT MGMT | Facility: CLINIC | Age: 52
End: 2022-07-13
Payer: COMMERCIAL

## 2022-07-13 RX ORDER — SEMAGLUTIDE 2.4 MG/.75ML
2.4 INJECTION, SOLUTION SUBCUTANEOUS
Qty: 3 ML | Refills: 1 | Status: SHIPPED | OUTPATIENT
Start: 2022-07-13 | End: 2022-08-08 | Stop reason: SDUPTHER

## 2022-08-18 ASSESSMENT — ENCOUNTER SYMPTOMS
MUSCULOSKELETAL NEGATIVE: 1
CARDIOVASCULAR NEGATIVE: 1
APPETITE CHANGE: 1
ENDOCRINE NEGATIVE: 1
EYES NEGATIVE: 1
PSYCHIATRIC NEGATIVE: 1
RESPIRATORY NEGATIVE: 1
ALLERGIC/IMMUNOLOGIC NEGATIVE: 1
HEMATOLOGIC/LYMPHATIC NEGATIVE: 1
GASTROINTESTINAL NEGATIVE: 1
NEUROLOGICAL NEGATIVE: 1

## 2022-08-18 NOTE — PROGRESS NOTES
COMPREHENSIVE WEIGHT AND WELLNESS FOLLOW UP VISIT   Verification of Patient Location:  The patient affirms they are currently located in the following state: [place]     Audio and Video Encounter   Adilia, my name is Cally Smith MD.  Before we proceed, can you please verify your identification by telling me your full name and date of birth?  Can you tell me who is in the room with you?    You and I are about to have a telemedicine check-in or visit because you have requested it.  This is a live video-conference.  I am a real person, speaking to you in real time.  There is no one else with me on the video-conference.  However, when we use (SmartMove, [employer], etc) it is important for you to know that the video-conference may not be secure or private.  I am not recording this conversation and I am asking you not to record it.  This telemedicine visit will be billed to your health insurance or you, if you are self-insured.  You understand you will be responsible for any copayments or coinsurances that apply to your telemedicine visit.  Communication platform used for this encounter:  [employer] Video Visit (no Zoom)     Before starting our telemedicine visit, I am required to get your consent for this virtual check-in or visit by telemedicine. Do you consent?    Patient Response to Request for Consent: Yes     HISTORY OF PRESENT ILLNESS - ASSESSMENT AND PLAN      CC:   Chief Complaint   Patient presents with   • Weight Management     Yany Mahoney is a 51 y.o. female following up on lifestyle management and weight loss as adjunctive treatment strategies for vitamin D deficiency, colon polyps, malignant melanoma, kidney stones, anxiety, sleep apnea, seasonal allergies, impaired fasting glucose with history of gestational diabetes, and obesity.      Body Composition Trend  Date Weight (Pounds) Body Fat %  Waist (Inches)   03/07/22 188.4 38.6 36   06/09/22 173.7 37 34   08/19/22 157.2 TELE TELE     Medications  used to support lifestyle modifications:   Wegovy 2.4mg weekly - no nausea, reflux or constipation, appetite feels regular   Phentermine 30mg daily - takes this between 10-11am and feels effects wear off before bed, not having any concerns regarding appetite or cravings at this dose   Supplements used to support lifestyle modifications: Vitamin C, Biotin, Vitamin D, CoQ10, Green Tea Extract, Probiotic, Magenesium, Multivitamin Omega 3 Fatty Acid     Interval history:   New notes, labs or studies reviewed since last visit:   06/17 NP [facility]  Status:Still craving sweets.    Sports are causing her not to eat a full dinner-always getting a protein, missing tracking.  She is getting 6-8 ounces of protein on scale, rare 4 ounces.   24 diet recall:  10 AM protein shake whey with cinnamon, melo seeds, collagen, PB powder (PB2 with probiotics), almond milk unsweetened  1:30 P cottage cheese 1 c, 1/2 c blue berries, 1 tbsp melo seeds  4 P verenice chips one bag  6:30 P 6 ounces baked chicken in wiped off onion soup w/ little Himalayan salt, little barbecue salt.  Total 80 ounces water.   Just water so far this a.m.       Challenges: struggling with cravings-especially cookies; having hunger and 24 hour diet recall was satisfactory.  We discussed that excess weight is a disease of neurobehavioral dysfunction and that changing behaviors is important in preventing weight regain.  Obesity is a neurobehavioral disease. Habits are ingrained neurological patterns that allow or brains to conserve energy.   We make more than 200 decisions each day about food, most of which are subconscious.  Our lives are perfectly designed to continue our current habits.  The environment, advertising, sight/smell,stress, emotions, time of day, social factors all affect behavior.    Lasting change come from new habit formation.  This takes time and consistent effort.       Nutrition Goals: Continue to get macros in and tracking is important for success.  "    Physical activity goals: Add HIIT to your walking routine.  The [employer] [place] video workout series is a good one- for HIIT workouts of 10-15 minutes.     Wellbeing goals: Advised to practice mindfulness with her busy schedule and always being \"on\".  This will help turn off her parasympathetic \"fight or flight\" system.       Medications and supplements: She is requesting increase in Phentermine and is aware this is short term and work on behavioral changes for lasting results.    Phentermine increase to 30 mg daily  Continue Wegovy 2.4 mg weekly.     Today's update: She has been resistance training more. Pulled a groin muscle. Wondering if the scale is moving more slowly due to building muscle. Clothes are fitting slightly differently. Has a contest with her daughter to drink water. Drinking 2L of water a day! Daughter plays field hockey and when her daughter runs she will jog behind her or run - currently doing run walk combination. No issues with sleep, going to bed at 8:30p-9p. Feeling good from a mental standpoint about weight loss - feels like seeing success has kept her motivated. Recognizes that when she loses weight more slowly - you can learn from your mistakes and then re-route your course and stick with it. She likes knowing that she can still indulge for special occasions. Went out for ice cream at the beach and tasted different ice creams, felt satisfied, but didn't feel like she needed her own serving.     Status of the weight related conditions, relevant history, and new concerns:  Assessment   Problem List Items Addressed This Visit        Endocrine/Metabolic    Impaired fasting glucose - Primary     We discussed today that without change in lifestyle, impaired fasting glucose can progress to prediabetes and then type 2 diabetes over time.  Continuing to have the same eating plan, level of nutrition, and lifestyle will not result in stable blood sugars over time.  Doing the same thing will result " in worsening of the condition.  The only way to stop progression or produce remission of insulin resistance is to eat a whole foods based diet, low in simple carbohydrates, get adequate physical activity, manage stress, and eat only in response to hunger. See assessment and plan under obesity for further details of plan for weight management.                Other    Overweight with body mass index (BMI) of 26 to 26.9 in adult     Continue Wegovy 2.4mg weekly and Phentermine 30mg daily.   Come in for BP check with [facility] in 1 month.   Continue your excellent water intake!   Continue to focus on food type, timing and teamwork.    Continue adequate protein intake to reduce hunger  Continue low insulin inducing foods to balance insulin and blood sugar.    Continue adequate cardiovascular activity, resistance training and nonexercise activity daily.    Continue to balance stress with relaxation techniques and aim for adequate sleep nightly.  Today we reviewed goals for health which include body fat less than 30% and waist size less than 35 inches.  We discussed that ultimate goals could be body fat between 25 and 30% and waist between 25 and 30 inches, we discussed that ultimately these numbers will have some contribution from genetics and that we should allow her to reach at that point that she would be able to comfortably maintain throughout her life.  We reviewed that weight regain is more damaging to health because weight regain is due to increased body fat and this is what causes adverse health related outcomes.                       Current Outpatient Medications on File Prior to Visit   Medication Sig Dispense Refill   • ascorbic acid (VITAMIN C ORAL) Take by mouth.     • biotin 10 mg tablet Take 10 mg by mouth daily.       • cholecalciferol, vitamin D3, 1,000 unit tablet Take by mouth.     • coenzyme Q10 (COQ10) 10 mg capsule Take 10 mg by mouth daily.       • green tea leaf extract (GREEN TEA ORAL) daily.     •  Lactobacillus acidophilus (PROBIOTIC ORAL) daily.     • magnesium oxide (MAG-OX) 400 mg (241.3 mg magnesium) tablet Take 400 mg by mouth daily.     • multivitamin (THERAGRAN) tablet Take by mouth.     • omega-3 fatty acids-fish oil 300-1,000 mg capsule Take by mouth.     • phentermine 30 mg capsule Take 1 capsule (30 mg total) by mouth every morning. 30 capsule 0   • ZINC-15 66 mg tablet Take by mouth.       No current facility-administered medications on file prior to visit.          Bee sting [bee venom protein (honey bee)]       Review of Systems   Constitutional: Positive for activity change and appetite change.   HENT: Negative.    Eyes: Negative.    Respiratory: Negative.    Cardiovascular: Negative.    Gastrointestinal: Negative.    Endocrine: Negative.    Genitourinary: Negative.    Musculoskeletal: Negative.    Skin: Negative.    Allergic/Immunologic: Negative.    Neurological: Negative.    Hematological: Negative.    Psychiatric/Behavioral: Negative.         PHYSICAL EXAMINATION      Visit Vitals  Wt 71.3 kg (157 lb 3.2 oz)   BMI 26.98 kg/m²      Body mass index is 26.98 kg/m².    BP Readings from Last 3 Encounters:   06/17/22 110/70   06/09/22 108/80   04/18/22 120/74     Wt Readings from Last 3 Encounters:   08/19/22 71.3 kg (157 lb 3.2 oz)   06/17/22 77.7 kg (171 lb 4.8 oz)   06/09/22 78.8 kg (173 lb 11.2 oz)       Physical Exam  Vitals signs reviewed.   Constitutional:       Appearance: Normal appearance. Well-developed.   HENT:      Head: Normocephalic and atraumatic.      Right Ear: External ear normal.      Left Ear: External ear normal.   Eyes:      General: Lids are normal.      Extraocular Movements: Extraocular movements intact.      Conjunctiva/sclera: Conjunctivae normal.   Pulmonary:      Effort: Pulmonary effort is normal.   Neurological:      General: No focal deficit present.      Mental Status: Alert and oriented to person, place, and time. Mental status is at baseline.   Psychiatric:          Attention and Perception: Attention and perception normal.         Mood and Affect: Mood and affect normal.         Speech: Speech normal.         Behavior: Behavior normal. Behavior is cooperative.         Thought Content: Thought content normal.         Cognition and Memory: Cognition and memory normal.         Judgment: Judgment normal.        TIME      I spent 30 minutes on this date of service performing the following activities: obtaining history, performing examination, entering orders, documenting, preparing for visit, obtaining / reviewing records and providing counseling and education.    Prior to this visit, if applicable, I reviewed all primary care, specialist, dietician and exercise physiology office visits that the patient has attended since his or her most recent visit with our program. Additionally, if applicable, I reviewed all recent labwork, imaging studies or procedures which the patient may have completed since his or her most recent visit in order to prepare for today's visit.     Cally Smith MD  8/19/2022

## 2022-08-19 ENCOUNTER — TELEMEDICINE (OUTPATIENT)
Dept: BARIATRICS/WEIGHT MGMT | Facility: CLINIC | Age: 52
End: 2022-08-19
Payer: COMMERCIAL

## 2022-08-19 VITALS — WEIGHT: 157.2 LBS | BODY MASS INDEX: 26.98 KG/M2

## 2022-08-19 DIAGNOSIS — E66.3 OVERWEIGHT WITH BODY MASS INDEX (BMI) OF 26 TO 26.9 IN ADULT: ICD-10-CM

## 2022-08-19 DIAGNOSIS — R73.01 IMPAIRED FASTING GLUCOSE: Primary | ICD-10-CM

## 2022-08-19 PROBLEM — E78.5 DYSLIPIDEMIA: Status: RESOLVED | Noted: 2018-09-24 | Resolved: 2022-08-19

## 2022-08-19 PROBLEM — N64.4 MASTODYNIA: Status: RESOLVED | Noted: 2021-04-13 | Resolved: 2022-08-19

## 2022-08-19 PROBLEM — M25.50 ARTHRALGIA: Status: RESOLVED | Noted: 2018-09-24 | Resolved: 2022-08-19

## 2022-08-19 PROBLEM — A69.20 LYME DISEASE: Status: RESOLVED | Noted: 2020-06-23 | Resolved: 2022-08-19

## 2022-08-19 PROCEDURE — 3008F BODY MASS INDEX DOCD: CPT | Performed by: STUDENT IN AN ORGANIZED HEALTH CARE EDUCATION/TRAINING PROGRAM

## 2022-08-19 PROCEDURE — 99214 OFFICE O/P EST MOD 30 MIN: CPT | Mod: 95 | Performed by: STUDENT IN AN ORGANIZED HEALTH CARE EDUCATION/TRAINING PROGRAM

## 2022-08-19 RX ORDER — SEMAGLUTIDE 2.4 MG/.75ML
2.4 INJECTION, SOLUTION SUBCUTANEOUS
Qty: 3 ML | Refills: 1 | Status: SHIPPED | OUTPATIENT
Start: 2022-08-19 | End: 2022-09-12 | Stop reason: SDUPTHER

## 2022-08-19 ASSESSMENT — ENCOUNTER SYMPTOMS: ACTIVITY CHANGE: 1

## 2022-08-19 NOTE — ASSESSMENT & PLAN NOTE
We discussed today that without change in lifestyle, impaired fasting glucose can progress to prediabetes and then type 2 diabetes over time.  Continuing to have the same eating plan, level of nutrition, and lifestyle will not result in stable blood sugars over time.  Doing the same thing will result in worsening of the condition.  The only way to stop progression or produce remission of insulin resistance is to eat a whole foods based diet, low in simple carbohydrates, get adequate physical activity, manage stress, and eat only in response to hunger. See assessment and plan under obesity for further details of plan for weight management.

## 2022-08-19 NOTE — Clinical Note
Please call patient and schedule follow up with me in 17 weeks telemed and 24 weeks in person or telemed, in person vs. telemed is up to patient preference. Thank you!

## 2022-08-19 NOTE — PATIENT INSTRUCTIONS
Continue Wegovy 2.4mg weekly and Phentermine 30mg daily.   Come in for BP check with [facility] in 1 month.   Continue your excellent water intake!   Continue to focus on food type, timing and teamwork.    Continue adequate protein intake to reduce hunger  Continue low insulin inducing foods to balance insulin and blood sugar.    Continue adequate cardiovascular activity, resistance training and nonexercise activity daily.    Continue to balance stress with relaxation techniques and aim for adequate sleep nightly.  Today we reviewed goals for health which include body fat less than 30% and waist size less than 35 inches.  We discussed that ultimate goals could be body fat between 25 and 30% and waist between 25 and 30 inches, we discussed that ultimately these numbers will have some contribution from genetics and that we should allow her to reach at that point that she would be able to comfortably maintain throughout her life.  We reviewed that weight regain is more damaging to health because weight regain is due to increased body fat and this is what causes adverse health related outcomes.

## 2022-09-09 NOTE — PROGRESS NOTES
HISTORY OF PRESENT ILLNESS        Yany Mahoney is a 52 y.o. female following up on lifestyle management and weight loss as adjunctive treatment strategies for Yany Mahoney is a 51 y.o. female following up on lifestyle management and weight loss as adjunctive treatment strategies for arthralgias, impaired fasting glucose, dyslipidemia, fatigue, elevated C-reactive protein.       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 Has a dog.  Very busy with kids. Non paying work to hope turns into job.   Mental thing with wt gain is tough.      First visit 3/7/22 with MD Smith. Wt 188 lbs.  Goal/Healthy weight: 125 lbs   Nutrition: HHB 5P/4F/3lf/4+hf    Body composition data:  03/07/22 188.4 38.6 36   06/09/22 173.7 37 34   08/19/22 157.2 TELE TELE   9/12/22 154.9, BF 33.1, REE 1278 BMI 26.4, WC 32     Last visit recap:   8/19/22 MD Sarah Camarena 2.4mg weekly - no nausea, reflux or constipation, appetite feels regular   Phentermine 30mg daily - takes this between 10-11am and feels effects wear off before bed, not having any concerns regarding appetite or cravings at this dose   Supplements used to support lifestyle modifications: Vitamin C, Biotin, Vitamin D, CoQ10, Green Tea Extract, Probiotic, Magenesium, Multivitamin Omega 3 Fatty Acid   She has been resistance training more. Pulled a groin muscle. Wondering if the scale is moving more slowly due to building muscle. Clothes are fitting slightly differently. Has a contest with her daughter to drink water. Drinking 2L of water a day! Daughter plays field hockey and when her daughter runs she will jog behind her or run - currently doing run walk combination. No issues with sleep, going to bed at 8:30p-9p. Feeling good from a mental standpoint about weight loss - feels like seeing success has kept her motivated. Recognizes that when she loses weight more slowly - you can learn from your mistakes and then re-route your course and stick with it.  She likes knowing that she can still indulge for special occasions. Went out for ice cream at the beach and tasted different ice creams, felt satisfied, but didn't feel like she needed her own serving.   Continue Wegovy 2.4mg weekly and Phentermine 30mg daily.   Come in for BP check with [facility] in 1 month.     Today's visit:   Concerns or questions: Doing well on medications.  She was on vacation at the [facility] and in a routine now.  She keeps the sweet to 400 calories a week.  Using Youtube workouts we gave her and drinking water.  Coffee twice weekly- now mostly drinking all water with lemon. Broke coffee with cream, sugar and feels better.  Does a tiny bit of nonglycemic sugar when has her coffee.  Doing more HIIT, she does something everyday.  Lifting weights but needs to increase this.      Medications used to support lifestyle modifications:  1. Wegovy 2.4 mg weekly  2. Phentermine 30 mg daily (refilled 9/9/22)    Nutrition:  Eating in 12 hour or less window, Getting 64+ oz water daily and Hitting protein goals most days    Physical activity:  Active everyday-HIIT some days.      Sleep:   Sleep is restful    Stress:   Managing stress well.  Her car is the bus.  Kids back to school.  Emery and Janet play sports.        PAST MEDICAL History        Past Medical History:   Diagnosis Date    Anxiety     Colon polyp 09/2019    1 hyperplastic polyp [id number]    Diabetes (CMS/HCC)     only associated with pregnacy    Elevated C-reactive protein (CRP)     Fibrocystic breast     Kidney stone 2000    Lyme disease 6/23/2020    Melanoma (CMS/HCC) 2018    upper back between scapula    Screening for breast cancer     annual mammograms, Type C density    Screening for colon cancer     Seasonal allergies        MEDICATIONS        Current Outpatient Medications on File Prior to Visit   Medication Sig Dispense Refill    ascorbic acid (VITAMIN C ORAL) Take by mouth.      biotin 10 mg tablet Take 10 mg by mouth  "daily.        cholecalciferol, vitamin D3, 1,000 unit tablet Take by mouth.      coenzyme Q10 (COQ10) 10 mg capsule Take 10 mg by mouth daily.        green tea leaf extract (GREEN TEA ORAL) daily.      Lactobacillus acidophilus (PROBIOTIC ORAL) daily.      magnesium oxide (MAG-OX) 400 mg (241.3 mg magnesium) tablet Take 400 mg by mouth daily.      multivitamin (THERAGRAN) tablet Take by mouth.      omega-3 fatty acids-fish oil 300-1,000 mg capsule Take by mouth.      phentermine 30 mg capsule Take 1 capsule (30 mg total) by mouth every morning. 30 capsule 0    ZINC-15 66 mg tablet Take by mouth.       No current facility-administered medications on file prior to visit.       ALLERGIES        Bee sting [bee venom protein (honey bee)]           REVIEW OF SYSTEMS      Review of Systems    Review of Systems   Constitutional: Positive for activity change and appetite change.   Respiratory: Negative.    Cardiovascular: Negative.    Gastrointestinal: Negative.    Psychiatric/Behavioral: Negative.    All other systems reviewed and are negative.       PHYSICAL EXAMINATION      Physical Exam  Wt Readings from Last 5 Encounters:   09/12/22 70.3 kg (154 lb 14.4 oz)   08/19/22 71.3 kg (157 lb 3.2 oz)   06/17/22 77.7 kg (171 lb 4.8 oz)   06/09/22 78.8 kg (173 lb 11.2 oz)   04/18/22 83.6 kg (184 lb 6.4 oz)     Visit Vitals  /70 (BP Location: Left upper arm, Patient Position: Sitting)   Pulse 87   Temp 36.7 °C (98.1 °F) (Temporal)   Resp 18   Ht 1.626 m (5' 4\")   Wt 70.3 kg (154 lb 14.4 oz)   SpO2 98%   BMI 26.59 kg/m²   NAD  No thyromegaly on palpation.  Lungs CTA  Cardiac RRR  Abd soft and nontender  Psych normal affect, pleasant and conversant.    LABS / IMAGING / EKG        Reviewed the following Labs:    Lab Results   Component Value Date    HGBA1C 5.4 09/23/2020    HGBA1C 5.2 09/19/2019    HGBA1C 5.0 10/02/2018     Lab Results   Component Value Date    CHOL 206 (H) 09/23/2020    CHOL 196 09/19/2019    CHOL 186 " 10/02/2018     Lab Results   Component Value Date    HDL 99 09/23/2020     09/19/2019    HDL 92 10/02/2018     Lab Results   Component Value Date    LDLCALC 98 09/23/2020    LDLCALC 84 09/19/2019    LDLCALC 83 10/02/2018     Lab Results   Component Value Date    TRIG 51 09/23/2020    TRIG 47 09/19/2019    TRIG 56 10/02/2018     No results found for: CHOLHDL      ASSESSMENT AND PLAN         Overweight with body mass index (BMI) of 26 to 26.9 in adult  Status: Doing well on medications.  She was on vacation at the [facility] and in a routine now.  She keeps the sweet to 400 calories a week.  Using YouPayRangeube workouts we gave her and drinking water.  Coffee twice weekly- now mostly drinking all water with lemon. Broke coffee with cream, sugar and feels better.  Does a tiny bit of nonglycemic sugar when has her coffee.  Doing more HIIT, she does something everyday.  Lifting weights but needs to increase this    Challenges: None    Nutrition Goals: She feels she is doing well with protein and water and will continue this.  I recommended reading Glucose Revolution by Phillip Cooper.      Physical activity goals:  Consistently meeting hourly movement goals and Exercising 7 days per week    Wellbeing goals:Stress is low.  Kids are in sports.      Medications and supplements:   1. Wegovy 2.4 mg weekly  2. Phentermine 30 mg daily (refilled 9/9/22)      I spent 35 minutes on this date of service performing the following activities: obtaining history, entering orders, documenting, preparing for visit, obtaining / reviewing records, providing counseling and education, independently reviewing study/studies, and coordinating care.        Impaired fasting glucose  We discussed that the focus of weight management is to improve insulin resistance through lifestyle changes and by identifying and correcting underlying contributing medical conditions.   Meds continued or initiated: Wegovy  See assessment and plan under obesity for  further details of plan for weight management.          I spent 35 minutes on this date of service performing the following activities: obtaining history, entering orders, documenting, preparing for visit, obtaining / reviewing records, providing counseling and education, independently reviewing study/studies, and coordinating care.     KEMI Miller    Thank you very much for allowing us to participate in the care of your patient. Please do not hesitate to call or email if there are any questions.

## 2022-09-12 ENCOUNTER — OFFICE VISIT (OUTPATIENT)
Dept: BARIATRICS/WEIGHT MGMT | Facility: CLINIC | Age: 52
End: 2022-09-12
Payer: COMMERCIAL

## 2022-09-12 VITALS
OXYGEN SATURATION: 98 % | WEIGHT: 154.9 LBS | HEART RATE: 87 BPM | SYSTOLIC BLOOD PRESSURE: 108 MMHG | RESPIRATION RATE: 18 BRPM | TEMPERATURE: 98.1 F | HEIGHT: 64 IN | BODY MASS INDEX: 26.44 KG/M2 | DIASTOLIC BLOOD PRESSURE: 70 MMHG

## 2022-09-12 DIAGNOSIS — E66.811 CLASS 1 OBESITY WITHOUT SERIOUS COMORBIDITY WITH BODY MASS INDEX (BMI) OF 31.0 TO 31.9 IN ADULT, UNSPECIFIED OBESITY TYPE: ICD-10-CM

## 2022-09-12 DIAGNOSIS — R73.01 IMPAIRED FASTING GLUCOSE: Primary | ICD-10-CM

## 2022-09-12 PROCEDURE — 3008F BODY MASS INDEX DOCD: CPT | Performed by: NURSE PRACTITIONER

## 2022-09-12 PROCEDURE — 99214 OFFICE O/P EST MOD 30 MIN: CPT | Performed by: NURSE PRACTITIONER

## 2022-09-12 RX ORDER — SEMAGLUTIDE 2.4 MG/.75ML
2.4 INJECTION, SOLUTION SUBCUTANEOUS
Qty: 3 ML | Refills: 1 | Status: SHIPPED | OUTPATIENT
Start: 2022-09-12 | End: 2022-10-18 | Stop reason: SDUPTHER

## 2022-09-12 ASSESSMENT — ENCOUNTER SYMPTOMS
GASTROINTESTINAL NEGATIVE: 1
ACTIVITY CHANGE: 1
RESPIRATORY NEGATIVE: 1
APPETITE CHANGE: 1
PSYCHIATRIC NEGATIVE: 1
CARDIOVASCULAR NEGATIVE: 1

## 2022-09-12 NOTE — ASSESSMENT & PLAN NOTE
Status: Doing well on medications.  She was on vacation at the [facility] [place] and in a routine now.  She keeps the sweet to 400 calories a week.  Using Youtube workouts we gave her and drinking water.  Coffee twice weekly- now mostly drinking all water with lemon. Broke coffee with cream, sugar and feels better.  Does a tiny bit of nonglycemic sugar when has her coffee.  Doing more HIIT, she does something everyday.  Lifting weights but needs to increase this    Challenges: None    Nutrition Goals: She feels she is doing well with protein and water and will continue this.  I recommended reading Glucose Revolution by Phillip Cooper.      Physical activity goals:  Consistently meeting hourly movement goals and Exercising 7 days per week    Wellbeing goals:Stress is low.  Kids are in sports.      Medications and supplements:   1. Wegovy 2.4 mg weekly  2. Phentermine 30 mg daily (refilled 9/9/22)      I spent 35 minutes on this date of service performing the following activities: obtaining history, entering orders, documenting, preparing for visit, obtaining / reviewing records, providing counseling and education, independently reviewing study/studies, and coordinating care.

## 2022-09-12 NOTE — ASSESSMENT & PLAN NOTE
We discussed that the focus of weight management is to improve insulin resistance through lifestyle changes and by identifying and correcting underlying contributing medical conditions.   Meds continued or initiated: Wegovy  See assessment and plan under obesity for further details of plan for weight management.

## 2022-10-06 ASSESSMENT — ENCOUNTER SYMPTOMS
CARDIOVASCULAR NEGATIVE: 1
ENDOCRINE NEGATIVE: 1
RESPIRATORY NEGATIVE: 1
GASTROINTESTINAL NEGATIVE: 1
PSYCHIATRIC NEGATIVE: 1
HEMATOLOGIC/LYMPHATIC NEGATIVE: 1
NEUROLOGICAL NEGATIVE: 1
EYES NEGATIVE: 1
APPETITE CHANGE: 1
MUSCULOSKELETAL NEGATIVE: 1
ALLERGIC/IMMUNOLOGIC NEGATIVE: 1
ACTIVITY CHANGE: 1

## 2022-10-06 NOTE — PROGRESS NOTES
COMPREHENSIVE WEIGHT AND WELLNESS FOLLOW UP VISIT     HISTORY OF PRESENT ILLNESS - ASSESSMENT AND PLAN      CC:   Chief Complaint   Patient presents with    Weight Management     Yany Mahoney is a 52 y.o. female following up on lifestyle management and weight loss as adjunctive treatment strategies for vitamin D deficiency, colon polyps, malignant melanoma, kidney stones, anxiety, sleep apnea, seasonal allergies, impaired fasting glucose with history of gestational diabetes, and obesity.      Body Composition Trend  Date Weight (Pounds) Body Fat %  Waist (Inches)   03/07/22 188.4 38.6 36   06/09/22 173.7 37 34   08/19/22 157.2 TELE TELE   10/07/22 152.8 31.6 30     Medications used to support lifestyle modifications:   Wegovy 2.4mg weekly - no nausea, reflux or constipation, appetite feels regular   Phentermine 30mg daily - takes this between 10-11am and feels effects wear off before bed, not having any concerns regarding appetite or cravings at this dose   Supplements used to support lifestyle modifications: Vitamin C, Biotin, Vitamin D, CoQ10, Green Tea Extract, Probiotic, Magenesium, Multivitamin Omega 3 Fatty Acid     Interval history:   New notes, labs or studies reviewed since last visit:   09/12 NP [facility]  Status: Doing well on medications.  She was on vacation at the [facility] and in a routine now.  She keeps the sweet to 400 calories a week.  Using Youtube workouts we gave her and drinking water.  Coffee twice weekly- now mostly drinking all water with lemon. Broke coffee with cream, sugar and feels better.  Does a tiny bit of nonglycemic sugar when has her coffee.  Doing more HIIT, she does something everyday.  Lifting weights but needs to increase this     Challenges: None     Nutrition Goals: She feels she is doing well with protein and water and will continue this.  I recommended reading Glucose Revolution by Phillip Cooper.       Physical activity goals:  Consistently meeting hourly movement  goals and Exercising 7 days per week     Wellbeing goals: Stress is low.  Kids are in sports.       Medications and supplements:   1. Wegovy 2.4 mg weekly  2. Phentermine 30 mg daily (refilled 9/9/22)    Today's update: Fractured her tooth in 6 places and needed an emergency tooth extraction this week. She was given steroids before her operation and antibiotic for afterwards. She has not been feeling well because of the pain and pain meds and has not been able to eat like she usually does. She has been taking Metamucil now to regular her bowels. Lowest weight on her home scale is 150lbs. Always felt her best between 125-130lbs, last time she was at this weight she was pre-menopause. Currently perimenopausal.  Staying consistent with exercise, if she cant get to the gym she will go for a walk in the neighborhood. Will order [employer] Glucose Revolution on [employer].     Status of the weight related conditions, relevant history, and new concerns:  Assessment   Problem List Items Addressed This Visit        Endocrine/Metabolic    Impaired fasting glucose - Primary     We discussed today that without change in lifestyle, impaired fasting glucose can progress to prediabetes and then type 2 diabetes over time.  Continuing to have the same eating plan, level of nutrition, and lifestyle will not result in stable blood sugars over time.  Doing the same thing will result in worsening of the condition.  The only way to stop progression or produce remission of insulin resistance is to eat a whole foods based diet, low in simple carbohydrates, get adequate physical activity, manage stress, and eat only in response to hunger.  Certain medications are also available to help to improve insulin sensitivity.   See assessment and plan under obesity for further details of plan for weight management.             Overweight with body mass index (BMI) of 26 to 26.9 in adult     Continue Wegovy 2.4mg weekly.   Continue Phentermine 30mg daily.    [place] site accessed and assessed. Patient information reviewed.  No clinical concerns noted.  We discussed we will continue both of these medications until BMI is 25 or less, body fat is less than 30%.  Today we reviewed goals for health which include body fat less than 30% and waist size less than 35 inches.  We discussed that ultimate goals could be body fat between 25 and 30% and waist between 25 and 30 inches, we discussed that ultimately these numbers will have some contribution from genetics and that we should allow her to reach at that point that she would be able to comfortably maintain throughout her life.  We reviewed that weight regain is more damaging to health because weight regain is due to increased body fat and this is what causes adverse health related outcomes.  We discussed ultimate weight goal between 130-140lbs for her. Discussed that reji and post menopausal body fat percentages will be higher to maintain hormone levels since ovaries are producing less.   For additional reading before our next visit, I recommend Glucose Revolution by  Martha Taveras  If you have Instagram [email or web address]   Here are her blood sugar balancing tips  -Start meals with leafy green, fibrous vegetables, then eat protein and fat, eat lower fiber, higher sugar carbs last  -Always eat a serving of fat before you eat a fruit  -Move for 10 minutes after meals - walk, put up dishes, do laundry, anything active counts   -If you do not have issues with acid reflux, try Apple Cider vinegar in a glass of water 10 minutes before a higher carbohydrate meal                          Current Outpatient Medications on File Prior to Visit   Medication Sig Dispense Refill    amoxicillin (AMOXIL) 250 mg capsule Take 250 mg by mouth 3 (three) times a day.      ascorbic acid (VITAMIN C ORAL) Take by mouth.      biotin 10 mg tablet Take 10 mg by mouth daily.        cholecalciferol, vitamin D3, 1,000 unit tablet Take by  "mouth.      coenzyme Q10 (COQ10) 10 mg capsule Take 10 mg by mouth daily.        green tea leaf extract (GREEN TEA ORAL) daily.      Lactobacillus acidophilus (PROBIOTIC ORAL) daily.      magnesium oxide (MAG-OX) 400 mg (241.3 mg magnesium) tablet Take 400 mg by mouth daily.      multivitamin (THERAGRAN) tablet Take by mouth.      omega-3 fatty acids-fish oil 300-1,000 mg capsule Take by mouth.      WEGOVY 2.4 mg/0.75 mL pen injector Inject 0.75 mL (2.4 mg total) under the skin every (seven) 7 days. 3 mL 1    ZINC-15 66 mg tablet Take by mouth.       No current facility-administered medications on file prior to visit.          Bee sting [bee venom protein (honey bee)]       Review of Systems   Constitutional: Positive for activity change and appetite change.   HENT: Negative.    Eyes: Negative.    Respiratory: Negative.    Cardiovascular: Negative.    Gastrointestinal: Negative.    Endocrine: Negative.    Genitourinary: Negative.    Musculoskeletal: Negative.    Skin: Negative.    Allergic/Immunologic: Negative.    Neurological: Negative.    Hematological: Negative.    Psychiatric/Behavioral: Negative.         PHYSICAL EXAMINATION      Visit Vitals  /76 (BP Location: Left upper arm, Patient Position: Sitting)   Pulse 68   Temp 36.6 °C (97.8 °F)   Resp 16   Ht 1.626 m (5' 4\")   Wt 69.3 kg (152 lb 12.8 oz)   SpO2 99%   BMI 26.23 kg/m²      Body mass index is 26.23 kg/m².    BP Readings from Last 3 Encounters:   10/07/22 114/76   09/12/22 108/70   06/17/22 110/70     Wt Readings from Last 3 Encounters:   10/07/22 69.3 kg (152 lb 12.8 oz)   09/12/22 70.3 kg (154 lb 14.4 oz)   08/19/22 71.3 kg (157 lb 3.2 oz)       Physical Exam  Vitals signs reviewed.   Constitutional:       Appearance: Normal appearance. Well-developed.   HENT:      Head: Normocephalic and atraumatic.      Right Ear: External ear normal.      Left Ear: External ear normal.   Eyes:      General: Lids are normal.      Extraocular Movements: " Extraocular movements intact.      Conjunctiva/sclera: Conjunctivae normal.   Pulmonary:      Effort: Pulmonary effort is normal.   Neurological:      General: No focal deficit present.      Mental Status: Alert and oriented to person, place, and time. Mental status is at baseline.   Psychiatric:         Attention and Perception: Attention and perception normal.         Mood and Affect: Mood and affect normal.         Speech: Speech normal.         Behavior: Behavior normal. Behavior is cooperative.         Thought Content: Thought content normal.         Cognition and Memory: Cognition and memory normal.         Judgment: Judgment normal.        TIME      I spent 30 minutes on this date of service performing the following activities: obtaining history, performing examination, entering orders, documenting, preparing for visit, obtaining / reviewing records and providing counseling and education.    Prior to this visit, if applicable, I reviewed all primary care, specialist, dietician and exercise physiology office visits that the patient has attended since his or her most recent visit with our program. Additionally, if applicable, I reviewed all recent labwork, imaging studies or procedures which the patient may have completed since his or her most recent visit in order to prepare for today's visit.     Cally Smith MD  10/7/2022

## 2022-10-07 ENCOUNTER — OFFICE VISIT (OUTPATIENT)
Dept: BARIATRICS/WEIGHT MGMT | Facility: CLINIC | Age: 52
End: 2022-10-07
Payer: COMMERCIAL

## 2022-10-07 VITALS
BODY MASS INDEX: 26.09 KG/M2 | OXYGEN SATURATION: 99 % | SYSTOLIC BLOOD PRESSURE: 114 MMHG | WEIGHT: 152.8 LBS | HEART RATE: 68 BPM | HEIGHT: 64 IN | TEMPERATURE: 97.8 F | RESPIRATION RATE: 16 BRPM | DIASTOLIC BLOOD PRESSURE: 76 MMHG

## 2022-10-07 DIAGNOSIS — E66.3 OVERWEIGHT WITH BODY MASS INDEX (BMI) OF 26 TO 26.9 IN ADULT: ICD-10-CM

## 2022-10-07 DIAGNOSIS — R73.01 IMPAIRED FASTING GLUCOSE: Primary | ICD-10-CM

## 2022-10-07 PROCEDURE — 3008F BODY MASS INDEX DOCD: CPT | Performed by: STUDENT IN AN ORGANIZED HEALTH CARE EDUCATION/TRAINING PROGRAM

## 2022-10-07 PROCEDURE — 99214 OFFICE O/P EST MOD 30 MIN: CPT | Performed by: STUDENT IN AN ORGANIZED HEALTH CARE EDUCATION/TRAINING PROGRAM

## 2022-10-07 RX ORDER — PHENTERMINE HYDROCHLORIDE 30 MG/1
30 CAPSULE ORAL EVERY MORNING
Qty: 30 CAPSULE | Refills: 0 | Status: SHIPPED | OUTPATIENT
Start: 2022-10-07 | End: 2022-11-08 | Stop reason: SDUPTHER

## 2022-10-07 RX ORDER — AMOXICILLIN 250 MG/1
250 CAPSULE ORAL 3 TIMES DAILY
COMMUNITY
End: 2023-06-23 | Stop reason: ALTCHOICE

## 2022-10-07 NOTE — PATIENT INSTRUCTIONS
Continue Wegovy 2.4mg weekly.   Continue Phentermine 30mg daily.   [place] site accessed and assessed. Patient information reviewed.  No clinical concerns noted.  We discussed we will continue both of these medications until BMI is 25 or less, body fat is less than 30%.  Today we reviewed goals for health which include body fat less than 30% and waist size less than 35 inches.  We discussed that ultimate goals could be body fat between 25 and 30% and waist between 25 and 30 inches, we discussed that ultimately these numbers will have some contribution from genetics and that we should allow her to reach at that point that she would be able to comfortably maintain throughout her life.  We reviewed that weight regain is more damaging to health because weight regain is due to increased body fat and this is what causes adverse health related outcomes.  We discussed ultimate weight goal between 130-140lbs for her. Discussed that reji and post menopausal body fat percentages will be higher to maintain hormone levels since ovaries are producing less.   For additional reading before our next visit, I recommend Glucose Revolution by  Martha Taveras  If you have Instagram [email or web address]   Here are her blood sugar balancing tips  -Start meals with leafy green, fibrous vegetables, then eat protein and fat, eat lower fiber, higher sugar carbs last  -Always eat a serving of fat before you eat a fruit  -Move for 10 minutes after meals - walk, put up dishes, do laundry, anything active counts   -If you do not have issues with acid reflux, try Apple Cider vinegar in a glass of water 10 minutes before a higher carbohydrate meal

## 2022-10-18 ENCOUNTER — TELEPHONE (OUTPATIENT)
Dept: BARIATRICS/WEIGHT MGMT | Facility: CLINIC | Age: 52
End: 2022-10-18
Payer: COMMERCIAL

## 2022-10-18 DIAGNOSIS — E66.811 CLASS 1 OBESITY WITHOUT SERIOUS COMORBIDITY WITH BODY MASS INDEX (BMI) OF 31.0 TO 31.9 IN ADULT, UNSPECIFIED OBESITY TYPE: ICD-10-CM

## 2022-10-18 RX ORDER — SEMAGLUTIDE 2.4 MG/.75ML
2.4 INJECTION, SOLUTION SUBCUTANEOUS
Qty: 3 ML | Refills: 1 | Status: SHIPPED | OUTPATIENT
Start: 2022-10-18 | End: 2022-11-16 | Stop reason: SDUPTHER

## 2022-10-18 NOTE — TELEPHONE ENCOUNTER
Last Medical provider visit: 10/7/22 AR            Last Dietitian or EP visit:  -  Next visit: 12/22/22 AR  Comments: wegovy

## 2022-11-08 RX ORDER — PHENTERMINE HYDROCHLORIDE 30 MG/1
30 CAPSULE ORAL EVERY MORNING
Qty: 30 CAPSULE | Refills: 0 | Status: SHIPPED | OUTPATIENT
Start: 2022-11-08 | End: 2022-12-14 | Stop reason: SDUPTHER

## 2022-11-08 NOTE — TELEPHONE ENCOUNTER
Last Medical provider visit: 10/7/22 Sarah            Last Dietitian or EP visit: -   Next visit: 12/22/22 Sarah  Comments: phentermine

## 2022-11-16 DIAGNOSIS — E66.811 CLASS 1 OBESITY WITHOUT SERIOUS COMORBIDITY WITH BODY MASS INDEX (BMI) OF 31.0 TO 31.9 IN ADULT, UNSPECIFIED OBESITY TYPE: ICD-10-CM

## 2022-11-16 RX ORDER — SEMAGLUTIDE 2.4 MG/.75ML
2.4 INJECTION, SOLUTION SUBCUTANEOUS
Qty: 3 ML | Refills: 1 | Status: SHIPPED | OUTPATIENT
Start: 2022-11-16 | End: 2022-12-14 | Stop reason: SDUPTHER

## 2022-11-16 NOTE — TELEPHONE ENCOUNTER
Last Medical provider visit: 10/7/22 Sarah            Last Dietitian or EP visit:  -  Next visit: 12/22/22 Sarah  Comments: wegovy

## 2022-12-05 ENCOUNTER — APPOINTMENT (OUTPATIENT)
Dept: URBAN - METROPOLITAN AREA CLINIC 203 | Age: 52
Setting detail: DERMATOLOGY
End: 2022-12-13

## 2022-12-05 DIAGNOSIS — L57.8 OTHER SKIN CHANGES DUE TO CHRONIC EXPOSURE TO NONIONIZING RADIATION: ICD-10-CM

## 2022-12-05 DIAGNOSIS — D485 NEOPLASM OF UNCERTAIN BEHAVIOR OF SKIN: ICD-10-CM

## 2022-12-05 DIAGNOSIS — Z11.52 ENCOUNTER FOR SCREENING FOR COVID-19: ICD-10-CM

## 2022-12-05 DIAGNOSIS — Z86.006 PERSONAL HISTORY OF MELANOMA IN-SITU: ICD-10-CM

## 2022-12-05 DIAGNOSIS — L20.84 INTRINSIC (ALLERGIC) ECZEMA: ICD-10-CM

## 2022-12-05 PROBLEM — D48.5 NEOPLASM OF UNCERTAIN BEHAVIOR OF SKIN: Status: ACTIVE | Noted: 2022-12-05

## 2022-12-05 PROCEDURE — OTHER PRESCRIPTION MEDICATION MANAGEMENT: OTHER

## 2022-12-05 PROCEDURE — OTHER PHOTO-DOCUMENTATION: OTHER

## 2022-12-05 PROCEDURE — OTHER SCREENING FOR COVID-19: OTHER

## 2022-12-05 PROCEDURE — 99213 OFFICE O/P EST LOW 20 MIN: CPT | Mod: 25

## 2022-12-05 PROCEDURE — OTHER COUNSELING: OTHER

## 2022-12-05 PROCEDURE — OTHER BIOPSY BY SHAVE METHOD: OTHER

## 2022-12-05 PROCEDURE — OTHER MIPS QUALITY: OTHER

## 2022-12-05 PROCEDURE — OTHER SUNSCREEN RECOMMENDATIONS: OTHER

## 2022-12-05 PROCEDURE — 11102 TANGNTL BX SKIN SINGLE LES: CPT

## 2022-12-05 ASSESSMENT — LOCATION SIMPLE DESCRIPTION DERM
LOCATION SIMPLE: RIGHT UPPER BACK
LOCATION SIMPLE: LEFT BREAST
LOCATION SIMPLE: ABDOMEN
LOCATION SIMPLE: LEFT POSTERIOR UPPER ARM

## 2022-12-05 ASSESSMENT — LOCATION DETAILED DESCRIPTION DERM
LOCATION DETAILED: LEFT MEDIAL BREAST 10-11:00 REGION
LOCATION DETAILED: RIGHT SUPERIOR UPPER BACK
LOCATION DETAILED: EPIGASTRIC SKIN
LOCATION DETAILED: LEFT MEDIAL BREAST 11-12:00 REGION
LOCATION DETAILED: LEFT DISTAL POSTERIOR UPPER ARM

## 2022-12-05 ASSESSMENT — LOCATION ZONE DERM
LOCATION ZONE: TRUNK
LOCATION ZONE: ARM

## 2022-12-05 ASSESSMENT — SEVERITY ASSESSMENT 2020: SEVERITY 2020: MILD

## 2022-12-14 DIAGNOSIS — E66.811 CLASS 1 OBESITY WITHOUT SERIOUS COMORBIDITY WITH BODY MASS INDEX (BMI) OF 31.0 TO 31.9 IN ADULT, UNSPECIFIED OBESITY TYPE: ICD-10-CM

## 2022-12-14 RX ORDER — PHENTERMINE HYDROCHLORIDE 30 MG/1
30 CAPSULE ORAL EVERY MORNING
Qty: 30 CAPSULE | Refills: 0 | Status: SHIPPED | OUTPATIENT
Start: 2022-12-14 | End: 2023-01-11 | Stop reason: SDUPTHER

## 2022-12-14 RX ORDER — SEMAGLUTIDE 2.4 MG/.75ML
2.4 INJECTION, SOLUTION SUBCUTANEOUS
Qty: 3 ML | Refills: 1 | Status: SHIPPED | OUTPATIENT
Start: 2022-12-14 | End: 2023-01-11 | Stop reason: SDUPTHER

## 2022-12-14 NOTE — TELEPHONE ENCOUNTER
Last Medical provider visit: 11/16/22 [facility]            Last Dietitian or EP visit: -   Next visit: 12/22/22Rhodes  Comments: phentermine

## 2022-12-22 ENCOUNTER — TELEMEDICINE (OUTPATIENT)
Dept: BARIATRICS/WEIGHT MGMT | Facility: CLINIC | Age: 52
End: 2022-12-22
Payer: COMMERCIAL

## 2022-12-22 VITALS — WEIGHT: 144.6 LBS | BODY MASS INDEX: 24.82 KG/M2

## 2022-12-22 DIAGNOSIS — R73.01 IMPAIRED FASTING GLUCOSE: Primary | ICD-10-CM

## 2022-12-22 PROCEDURE — 3008F BODY MASS INDEX DOCD: CPT | Performed by: STUDENT IN AN ORGANIZED HEALTH CARE EDUCATION/TRAINING PROGRAM

## 2022-12-22 PROCEDURE — 99215 OFFICE O/P EST HI 40 MIN: CPT | Mod: 95 | Performed by: STUDENT IN AN ORGANIZED HEALTH CARE EDUCATION/TRAINING PROGRAM

## 2022-12-22 ASSESSMENT — ENCOUNTER SYMPTOMS
ENDOCRINE NEGATIVE: 1
RESPIRATORY NEGATIVE: 1
EYES NEGATIVE: 1
GASTROINTESTINAL NEGATIVE: 1
CARDIOVASCULAR NEGATIVE: 1
HEMATOLOGIC/LYMPHATIC NEGATIVE: 1
ALLERGIC/IMMUNOLOGIC NEGATIVE: 1
NEUROLOGICAL NEGATIVE: 1
PSYCHIATRIC NEGATIVE: 1
CONSTITUTIONAL NEGATIVE: 1
MUSCULOSKELETAL NEGATIVE: 1

## 2022-12-22 NOTE — ASSESSMENT & PLAN NOTE
Continue Wegovy 2.4mg weekly.   Continue Phentermine 30mg daily - we will reassess if you need this in the coming year  Ask Dr. Giang about [facility] cholesterol testing for your . I see Dr. Rolf Jackson at [facility].   Start to focus meals on high fiber.   -Try to get legumes in with at least 1 if not 2 meals per day, lentils have the highest fiber   Recommend your  read Fiber Fueled: The Plant-Based Gut Health Program for Losing Weight, Restoring Your Health, and Optimizing Your Microbiome by Braden Caba MD   For sweets:  -https://Cloud Amenity/no-flour-black-bean-brownies/  -https://Doculogy/recipes/chocolate-melo-pudding/  -https://www.[employer]/no-bake-energy-bites/  -https://Pavlok/  -https://www.Ret[employer]/products/carb-balance-soft-haiu-qyodg-stujmoruw/  -https://www.MyWebGrocer/  -https://www.Slack/collections/nut-butters/products/classic-peanut-butter  -https://www.[employer]/tvp-textured-veg-protein.html  -https://[employer].TalkyLand/vegan-tvp-meatballs/

## 2022-12-22 NOTE — PROGRESS NOTES
COMPREHENSIVE WEIGHT AND WELLNESS FOLLOW UP VISIT   Verification of Patient Location:  The patient affirms they are currently located in the following state: [place]     Audio and Video Encounter   Adilia, my name is Cally Smith MD.  Before we proceed, can you please verify your identification by telling me your full name and date of birth?  Can you tell me who is in the room with you?    You and I are about to have a telemedicine check-in or visit because you have requested it.  This is a live video-conference.  I am a real person, speaking to you in real time.  There is no one else with me on the video-conference. I am not recording this conversation and I am asking you not to record it.  This telemedicine visit will be billed to your health insurance or you, if you are self-insured.  You understand you will be responsible for any copayments or coinsurances that apply to your telemedicine visit.  Communication platform used for this encounter:  [facility] Video Visit (Epic Video Client)       Before starting our telemedicine visit, I am required to get your consent for this virtual check-in or visit by telemedicine. Do you consent?     Patient Response to Request for Consent: Yes     HISTORY OF PRESENT ILLNESS - ASSESSMENT AND PLAN      CC:   Chief Complaint   Patient presents with   • Weight Management     Yany Mahoney is a 52 y.o. female following up on lifestyle management and weight loss as adjunctive treatment strategies for vitamin D deficiency, colon polyps, malignant melanoma, kidney stones, anxiety, sleep apnea, seasonal allergies, impaired fasting glucose with history of gestational diabetes, and obesity.      Body Composition Trend  Date Weight (Pounds) Body Fat %  Waist (Inches)   03/07/22 188.4 38.6 36   06/09/22 173.7 37 34   08/19/22 157.2 TELE TELE   10/07/22 152.8 31.6 30   12/22/22 144.6 TELE TELE     Medications used to support lifestyle modifications:   Wegovy 2.4mg weekly - no  nausea, reflux or constipation, appetite feels regular   Phentermine 30mg daily not having any concerns regarding appetite or cravings at this dose   Supplements used to support lifestyle modifications: Vitamin C, Biotin, Vitamin D, CoQ10, Green Tea Extract, Probiotic, Magenesium, Multivitamin Omega 3 Fatty Acid     Interval history:   New notes, labs or studies reviewed since last visit: No [facility] providers, has not completed labs ordered in March 2022.   12/07 Messaged about AI and low cholesterol plans     Today's update: She has lost 17% of her body weight since June. Weight has been hovering between 144-147 the past few weeks. Reports diet has not been great the past two weeks. Her 49 year old  had two 99% coronary vessel blockages and got two stents. She is not sleeping well, not eating 5 servings of protein per day. She has been researching low cholesterol diet for her . She realizes they have to change everything about their diet for his health. She has been making him avocado toast with whole wheat bread. She has been eating half and throwing an egg on top for herself and finds this really satisfying. She would like to continue Phentermine for a few more months. Her  is 6ft 5in and has already lost 18lbs. They are using avocado, olive oil, fruit, oatmeal, nuts, seeds. They have eliminated cheese. Sticking to chicken, fish and pork. She knows variety will help.     Status of the weight related conditions, relevant history, and new concerns:  Assessment   Problem List Items Addressed This Visit        Endocrine/Metabolic    Impaired fasting glucose - Primary     We discussed today that without change in lifestyle, impaired fasting glucose can progress to prediabetes and then type 2 diabetes over time.  Continuing to have the same eating plan, level of nutrition, and lifestyle will not result in stable blood sugars over time.  Doing the same thing will result in worsening of the condition.   The only way to stop progression or produce remission of insulin resistance is to eat a whole foods based diet, low in simple carbohydrates, get adequate physical activity, manage stress, and eat only in response to hunger.  Certain medications are also available to help to improve insulin sensitivity.   See assessment and plan under obesity for further details of plan for weight management.              Other    BMI 24.0-24.9, adult     Continue Wegovy 2.4mg weekly.   Continue Phentermine 30mg daily - we will reassess if you need this in the coming year  Ask Dr. Giang about [facility] cholesterol testing for your . I see Dr. Rolf Jackson at [facility].   Start to focus meals on high fiber.   -Try to get legumes in with at least 1 if not 2 meals per day, lentils have the highest fiber   Recommend your  read Fiber Fueled: The Plant-Based Gut Health Program for Losing Weight, Restoring Your Health, and Optimizing Your Microbiome by Braden Caba MD   For sweets:  -https://Cytodyn/no-flour-black-bean-brownies/  -https://Ecorithm/recipes/chocolate-melo-pudding/  -https://www.Hematris [employer] Care/no-bake-energy-bites/  -https://Auxmoney/  -https://www.Yobongo/products/carb-balance-soft-rqwz-dzewq-pdgwkmkfm/  -https://www.Existence Before Essence/  -https://www.MBW Enterprise/collections/nut-butters/products/classic-peanut-butter  -https://www.SegundoHogar/tvp-textured-veg-protein.html  -https://Uniregistry/vegan-tvp-meatballs/                  Current Outpatient Medications on File Prior to Visit   Medication Sig Dispense Refill   • amoxicillin (AMOXIL) 250 mg capsule Take 250 mg by mouth 3 (three) times a day.     • ascorbic acid (VITAMIN C ORAL) Take by mouth.     • biotin 10 mg tablet Take 10 mg by mouth daily.       • cholecalciferol, vitamin D3, 1,000 unit tablet Take by mouth.     • coenzyme Q10 (COQ10) 10 mg capsule Take 10 mg by mouth daily.        • green tea leaf extract (GREEN TEA ORAL) daily.     • Lactobacillus acidophilus (PROBIOTIC ORAL) daily.     • magnesium oxide (MAG-OX) 400 mg (241.3 mg magnesium) tablet Take 400 mg by mouth daily.     • multivitamin (THERAGRAN) tablet Take by mouth.     • omega-3 fatty acids-fish oil 300-1,000 mg capsule Take by mouth.     • phentermine 30 mg capsule Take 1 capsule (30 mg total) by mouth every morning. 30 capsule 0   • WEGOVY 2.4 mg/0.75 mL pen injector Inject 0.75 mL (2.4 mg total) under the skin every (seven) 7 days. 3 mL 1   • ZINC-15 66 mg tablet Take by mouth.       No current facility-administered medications on file prior to visit.          Bee sting [bee venom protein (honey bee)]       Review of Systems   Constitutional: Negative.    HENT: Negative.    Eyes: Negative.    Respiratory: Negative.    Cardiovascular: Negative.    Gastrointestinal: Negative.    Endocrine: Negative.    Genitourinary: Negative.    Musculoskeletal: Negative.    Skin: Negative.    Allergic/Immunologic: Negative.    Neurological: Negative.    Hematological: Negative.    Psychiatric/Behavioral: Negative.         PHYSICAL EXAMINATION      Visit Vitals  Wt 65.6 kg (144 lb 9.6 oz)   BMI 24.82 kg/m²      Body mass index is 24.82 kg/m².    BP Readings from Last 3 Encounters:   10/07/22 114/76   09/12/22 108/70   06/17/22 110/70     Wt Readings from Last 3 Encounters:   12/22/22 65.6 kg (144 lb 9.6 oz)   10/07/22 69.3 kg (152 lb 12.8 oz)   09/12/22 70.3 kg (154 lb 14.4 oz)       Physical Exam  Vitals signs reviewed.   Constitutional:       Appearance: Normal appearance. Well-developed.   HENT:      Head: Normocephalic and atraumatic.      Right Ear: External ear normal.      Left Ear: External ear normal.   Eyes:      General: Lids are normal.      Extraocular Movements: Extraocular movements intact.      Conjunctiva/sclera: Conjunctivae normal.   Pulmonary:      Effort: Pulmonary effort is normal.   Neurological:      General: No  focal deficit present.      Mental Status: Alert and oriented to person, place, and time. Mental status is at baseline.   Psychiatric:         Attention and Perception: Attention and perception normal.         Mood and Affect: Mood and affect normal.         Speech: Speech normal.         Behavior: Behavior normal. Behavior is cooperative.         Thought Content: Thought content normal.         Cognition and Memory: Cognition and memory normal.         Judgment: Judgment normal.        TIME      I spent 45 minutes on this date of service performing the following activities: obtaining history, performing examination, entering orders, documenting, preparing for visit, obtaining / reviewing records and providing counseling and education.    Prior to this visit, if applicable, I reviewed all primary care, specialist, dietician and exercise physiology office visits that the patient has attended since his or her most recent visit with our program. Additionally, if applicable, I reviewed all recent labwork, imaging studies or procedures which the patient may have completed since his or her most recent visit in order to prepare for today's visit.     Cally Smith MD  12/22/2022

## 2022-12-22 NOTE — PATIENT INSTRUCTIONS
Continue Wegovy 2.4mg weekly.   Continue Phentermine 30mg daily - we will reassess if you need this in the coming year  Ask Dr. Giang about [facility] cholesterol testing for your . I see Dr. Rolf Jackson at [facility].   Start to focus meals on high fiber.   -Try to get legumes in with at least 1 if not 2 meals per day, lentils have the highest fiber   Recommend your  read Fiber Fueled: The Plant-Based Gut Health Program for Losing Weight, Restoring Your Health, and Optimizing Your Microbiome by Braden Caba MD   For sweets:  -https://Blue Belt Technologies/no-flour-black-bean-brownies/  -https://Trapeze Networks/recipes/chocolate-melo-pudding/  -https://www.Pathology Holdings/no-bake-energy-bites/  -https://Local Funeral/  -https://www.Activism.com/products/carb-balance-soft-lmsx-fkdrd-enksvacmj/  -https://www.Next 1 Interactive/  -https://www.Visualead/collections/nut-butters/products/classic-peanut-butter  -https://www.First Aid Shot Therapy/tvp-textured-veg-protein.html  -https://NTE Energy.SnowGate/vegan-tvp-meatballs/

## 2022-12-27 ENCOUNTER — HOSPITAL ENCOUNTER (OUTPATIENT)
Dept: RADIOLOGY | Facility: HOSPITAL | Age: 52
Discharge: HOME | End: 2022-12-27
Attending: OBSTETRICS & GYNECOLOGY
Payer: COMMERCIAL

## 2022-12-27 DIAGNOSIS — Z80.3 FAMILY HISTORY OF MALIGNANT NEOPLASM OF BREAST: ICD-10-CM

## 2022-12-27 RX ORDER — GADOBUTROL 604.72 MG/ML
6.4 INJECTION INTRAVENOUS ONCE
Status: COMPLETED | OUTPATIENT
Start: 2022-12-27 | End: 2022-12-27

## 2022-12-27 RX ADMIN — GADOBUTROL 6.4 ML: 604.72 INJECTION INTRAVENOUS at 14:01

## 2023-01-03 ENCOUNTER — TELEPHONE (OUTPATIENT)
Dept: SURGERY | Facility: CLINIC | Age: 53
End: 2023-01-03
Payer: COMMERCIAL

## 2023-01-03 NOTE — TELEPHONE ENCOUNTER
"RN called patient for MRI results. No answer. Left a voicemail with office call back number. RN to make another attempt to reach the patient.     Per Dr. Greer, \"MRI was normal\".    "

## 2023-01-04 NOTE — TELEPHONE ENCOUNTER
RN spoke with patient in regards to MRI results. Patient notified MRI was normal per Dr. Greer.  Patient to alternate breast exams with Dr. Lao and Dr. Greer.  Patient to call and schedule her appointment.  Last office visit 04/2021. Patient without further questions at time of call.

## 2023-01-11 DIAGNOSIS — E66.811 CLASS 1 OBESITY WITHOUT SERIOUS COMORBIDITY WITH BODY MASS INDEX (BMI) OF 31.0 TO 31.9 IN ADULT, UNSPECIFIED OBESITY TYPE: ICD-10-CM

## 2023-01-11 RX ORDER — PHENTERMINE HYDROCHLORIDE 30 MG/1
30 CAPSULE ORAL EVERY MORNING
Qty: 30 CAPSULE | Refills: 0 | Status: SHIPPED | OUTPATIENT
Start: 2023-01-11 | End: 2023-02-13 | Stop reason: SDUPTHER

## 2023-01-11 RX ORDER — SEMAGLUTIDE 2.4 MG/.75ML
2.4 INJECTION, SOLUTION SUBCUTANEOUS
Qty: 3 ML | Refills: 1 | Status: SHIPPED | OUTPATIENT
Start: 2023-01-11 | End: 2023-02-13 | Stop reason: SDUPTHER

## 2023-01-11 NOTE — TELEPHONE ENCOUNTER
Last Medical provider visit: 12/22/22 Sarah            Last Dietitian or EP visit: -   Next visit: 2/10/23 Sarah  Comments: phentermine

## 2023-01-11 NOTE — TELEPHONE ENCOUNTER
Last Medical provider visit: 12/22/22 Sarah            Last Dietitian or EP visit: -   Next visit: 2/10/23 Sarah  Comments:  wegovy

## 2023-02-13 DIAGNOSIS — E66.811 CLASS 1 OBESITY WITHOUT SERIOUS COMORBIDITY WITH BODY MASS INDEX (BMI) OF 31.0 TO 31.9 IN ADULT, UNSPECIFIED OBESITY TYPE: ICD-10-CM

## 2023-02-13 RX ORDER — PHENTERMINE HYDROCHLORIDE 30 MG/1
30 CAPSULE ORAL EVERY MORNING
Qty: 30 CAPSULE | Refills: 0 | Status: SHIPPED | OUTPATIENT
Start: 2023-02-13 | End: 2023-03-07

## 2023-02-13 RX ORDER — SEMAGLUTIDE 2.4 MG/.75ML
2.4 INJECTION, SOLUTION SUBCUTANEOUS
Qty: 3 ML | Refills: 1 | Status: SHIPPED | OUTPATIENT
Start: 2023-02-13 | End: 2023-03-14 | Stop reason: SDUPTHER

## 2023-02-13 NOTE — TELEPHONE ENCOUNTER
Medicine Refill Request    Last Office: 10/7/2022   Last Consult Visit: Visit date not found  Last Telemedicine Visit: 12/22/2022 Cally Smith MD    Next Appointment: 2/20/2023      Current Outpatient Medications:   •  amoxicillin (AMOXIL) 250 mg capsule, Take 250 mg by mouth 3 (three) times a day., Disp: , Rfl:   •  ascorbic acid (VITAMIN C ORAL), Take by mouth., Disp: , Rfl:   •  biotin 10 mg tablet, Take 10 mg by mouth daily.  , Disp: , Rfl:   •  cholecalciferol, vitamin D3, 1,000 unit tablet, Take by mouth., Disp: , Rfl:   •  coenzyme Q10 (COQ10) 10 mg capsule, Take 10 mg by mouth daily.  , Disp: , Rfl:   •  green tea leaf extract (GREEN TEA ORAL), daily., Disp: , Rfl:   •  Lactobacillus acidophilus (PROBIOTIC ORAL), daily., Disp: , Rfl:   •  magnesium oxide (MAG-OX) 400 mg (241.3 mg magnesium) tablet, Take 400 mg by mouth daily., Disp: , Rfl:   •  multivitamin (THERAGRAN) tablet, Take by mouth., Disp: , Rfl:   •  omega-3 fatty acids-fish oil 300-1,000 mg capsule, Take by mouth., Disp: , Rfl:   •  phentermine 30 mg capsule, Take 1 capsule (30 mg total) by mouth every morning., Disp: 30 capsule, Rfl: 0  •  WEGOVY 2.4 mg/0.75 mL pen injector, Inject 0.75 mL (2.4 mg total) under the skin every (seven) 7 days., Disp: 3 mL, Rfl: 1  •  ZINC-15 66 mg tablet, Take by mouth., Disp: , Rfl:       BP Readings from Last 3 Encounters:   10/07/22 114/76   09/12/22 108/70   06/17/22 110/70       Recent Lab results:  Lab Results   Component Value Date    CHOL 206 (H) 09/23/2020   ,   Lab Results   Component Value Date    HDL 99 09/23/2020   ,   Lab Results   Component Value Date    LDLCALC 98 09/23/2020   ,   Lab Results   Component Value Date    TRIG 51 09/23/2020        Lab Results   Component Value Date    GLUCOSE 103 (H) 09/23/2020   ,   Lab Results   Component Value Date    HGBA1C 5.4 09/23/2020         Lab Results   Component Value Date    CREATININE 0.78 09/23/2020       Lab Results   Component Value Date    TSH  1.210 09/23/2020

## 2023-02-19 ASSESSMENT — ENCOUNTER SYMPTOMS
ENDOCRINE NEGATIVE: 1
HEMATOLOGIC/LYMPHATIC NEGATIVE: 1
EYES NEGATIVE: 1
GASTROINTESTINAL NEGATIVE: 1
ALLERGIC/IMMUNOLOGIC NEGATIVE: 1
CONSTITUTIONAL NEGATIVE: 1
NEUROLOGICAL NEGATIVE: 1
PSYCHIATRIC NEGATIVE: 1
RESPIRATORY NEGATIVE: 1
MUSCULOSKELETAL NEGATIVE: 1
CARDIOVASCULAR NEGATIVE: 1

## 2023-02-19 NOTE — PROGRESS NOTES
COMPREHENSIVE WEIGHT AND WELLNESS FOLLOW UP VISIT   Verification of Patient Location:  The patient affirms they are currently located in the following state: [place]     Audio and Video Encounter   Adilia, my name is Cally Smith MD.  Before we proceed, can you please verify your identification by telling me your full name and date of birth?  Can you tell me who is in the room with you?    You and I are about to have a telemedicine check-in or visit because you have requested it.  This is a live video-conference.  I am a real person, speaking to you in real time.  There is no one else with me on the video-conference. I am not recording this conversation and I am asking you not to record it.  This telemedicine visit will be billed to your health insurance or you, if you are self-insured.  You understand you will be responsible for any copayments or coinsurances that apply to your telemedicine visit.  Communication platform used for this encounter:  [facility] Video Visit (Epic Video Client)       Before starting our telemedicine visit, I am required to get your consent for this virtual check-in or visit by telemedicine. Do you consent?     Patient Response to Request for Consent: Yes     HISTORY OF PRESENT ILLNESS - ASSESSMENT AND PLAN      CC:   Chief Complaint   Patient presents with   • Weight Management     Yany Mahoney is a 52 y.o. female following up on lifestyle management and weight loss as adjunctive treatment strategies for vitamin D deficiency, colon polyps, malignant melanoma, kidney stones, anxiety, sleep apnea, seasonal allergies, impaired fasting glucose with history of gestational diabetes, and obesity.      Body Composition Trend  Date Weight (Pounds) Body Fat %  Waist (Inches)   03/07/22 188.4 38.6 36   06/09/22 173.7 37 34   08/19/22 157.2 TELE TELE   10/07/22 152.8 31.6 30   12/22/22 144.6 TELE TELE   02/20/23 137 TELE TELE     Medications used to support lifestyle modifications:    Wegovy 2.4mg weekly - no nausea, reflux or constipation, appetite feels regular   Phentermine 30mg daily not having any concerns regarding appetite or cravings at this dose   Supplements used to support lifestyle modifications: Vitamin C, Biotin, Vitamin D, CoQ10, Green Tea Extract, Probiotic, Magenesium, Multivitamin, Omega 3 Fatty Acid     Interval history:   New notes, labs or studies reviewed since last visit: No [facility] providers, has not completed labs ordered in March 2022.   12/27 Normal Breast MRI     Today's update: Goal weight is 130. Wants to discuss taper plan.       Status of the weight related conditions, relevant history, and new concerns:  Assessment   Problem List Items Addressed This Visit        Endocrine/Metabolic    Vitamin D deficiency     Educated patient that Vitamin D is important to reduce inflammation, modulate processes such as cell growth, neuromuscular and immune function, as well as glucose metabolism.   Counseled patient that low Vitamin D levels can leave inflammation unchecked, lead to suboptimal glucose metabolism as well as worsen insulin resistance. All this in turn can make weight loss more challenging.   Counseled patient that the optimal range for Vitamin D levels for weight loss is greater than 50.  Vitamin D supplements can be obtained over the counter at the pharmacy, adequate repletion can be either 2000 IU daily or 10,0000 IU once weekly. Vitamin D is best absorbed when taken with food or a meal that contains fat.  Check vitamin D level to ensure adequate response to therapy.   See assessment and plan under obesity for further details of plan for weight management.             Relevant Orders    Vitamin D 25 hydroxy    Impaired fasting glucose - Primary     We discussed today that without change in lifestyle, impaired fasting glucose can progress to prediabetes and then type 2 diabetes over time.  Continuing to have the same eating plan, level of nutrition, and lifestyle  will not result in stable blood sugars over time.  Doing the same thing will result in worsening of the condition.  The only way to stop progression or produce remission of insulin resistance is to eat a whole foods based diet, low in simple carbohydrates, get adequate physical activity, manage stress, and eat only in response to hunger.  Certain medications are also available to help to improve insulin sensitivity.   See assessment and plan under obesity for further details of plan for weight management.           Relevant Orders    Hemoglobin A1c    CBC    Comprehensive metabolic panel    Lipid panel    TSH w reflex FT4       Other    Elevated C-reactive protein (CRP)    Relevant Orders    C-reactive protein    BMI 23.0-23.9, adult     Continue Wegovy 2.4mg weekly and Phentermine 30mg daily.   [place] PDMP site accessed and assessed. Patient information reviewed.  No clinical concerns noted.  Get fasting bloodwork done: CBC, CMP, Lipids, TSH, Vitamin D/B12 and CRP.   Please stop biotin for 1 week prior to getting labs done and hold your Vitamin D and Complete Bf or 3 days prior to labs.   At your visit with RD, we will get a body composition measurement and if body fat is < 30%, we will start to wean down on medications.   The first medication we will taper is Phentermine - plan to reduce to 15mg for 2 weeks then reduce to 8mg for two weeks then stop.  Once you have been off Phentermine for 1 month, we can taper Wegovy. We would lower Wegovy to 1.7mg for 2-3 months and continue to monitor weight and body composition.     For your , [email or web address]    WHAT YOU SHOULD KNOW ABOUT REEVUE METABOLIC TESTING    As we discussed in today's visit, I am recommending you have further testing done at our [place] office so we can better understand your metabolism. Oftentimes if you have a history of dieting multiple times in the past, your metabolism may be slower than we would  expect.     The REEVUE test measures the oxygen that your body consumes to calculate your Resting Energy Expenditure (REE), commonly referred to as a Resting Metabolic Rate (RMR). This will allow us to see if you have an abnormally low metabolic rate and can help us determine the optimal nutrition plan for you.     For 12 hours prior to the test, please do not exercise, eat or drink anything but water. This includes coffee or tea the morning of the test. During the test, you will place a nose clip on your nose and breath normally through a mouthpiece for approximately 10 minutes.     If this test is not covered by your insurance, the cost is $66.     Call [phone] to schedule your appointment at [place] [address]             Other Visit Diagnoses     Low vitamin B12 level        Relevant Orders    Vitamin B12                Current Outpatient Medications on File Prior to Visit   Medication Sig Dispense Refill   • amoxicillin (AMOXIL) 250 mg capsule Take 250 mg by mouth 3 (three) times a day.     • ascorbic acid (VITAMIN C ORAL) Take by mouth.     • biotin 10 mg tablet Take 10 mg by mouth daily.       • cholecalciferol, vitamin D3, 1,000 unit tablet Take by mouth.     • coenzyme Q10 (COQ10) 10 mg capsule Take 10 mg by mouth daily.       • green tea leaf extract (GREEN TEA ORAL) daily.     • Lactobacillus acidophilus (PROBIOTIC ORAL) daily.     • magnesium oxide (MAG-OX) 400 mg (241.3 mg magnesium) tablet Take 400 mg by mouth daily.     • multivitamin (THERAGRAN) tablet Take by mouth.     • omega-3 fatty acids-fish oil 300-1,000 mg capsule Take by mouth.     • phentermine 30 mg capsule Take 1 capsule (30 mg total) by mouth every morning. 30 capsule 0   • WEGOVY 2.4 mg/0.75 mL pen injector Inject 0.75 mL (2.4 mg total) under the skin every (seven) 7 days. 3 mL 1   • ZINC-15 66 mg tablet Take by mouth.       No current facility-administered medications on file prior to visit.           Bee sting [bee venom protein (honey bee)]       Review of Systems   Constitutional: Negative.    HENT: Negative.    Eyes: Negative.    Respiratory: Negative.    Cardiovascular: Negative.    Gastrointestinal: Negative.    Endocrine: Negative.    Genitourinary: Negative.    Musculoskeletal: Negative.    Skin: Negative.    Allergic/Immunologic: Negative.    Neurological: Negative.    Hematological: Negative.    Psychiatric/Behavioral: Negative.         PHYSICAL EXAMINATION      Visit Vitals  Wt 62.1 kg (137 lb)   BMI 23.52 kg/m²      Body mass index is 23.52 kg/m².    BP Readings from Last 3 Encounters:   10/07/22 114/76   09/12/22 108/70   06/17/22 110/70     Wt Readings from Last 3 Encounters:   02/20/23 62.1 kg (137 lb)   12/22/22 65.6 kg (144 lb 9.6 oz)   10/07/22 69.3 kg (152 lb 12.8 oz)       Physical Exam  Vitals signs reviewed.   Constitutional:       Appearance: Normal appearance. Well-developed.   HENT:      Head: Normocephalic and atraumatic.      Right Ear: External ear normal.      Left Ear: External ear normal.   Eyes:      General: Lids are normal.      Extraocular Movements: Extraocular movements intact.      Conjunctiva/sclera: Conjunctivae normal.   Pulmonary:      Effort: Pulmonary effort is normal.   Neurological:      General: No focal deficit present.      Mental Status: Alert and oriented to person, place, and time. Mental status is at baseline.   Psychiatric:         Attention and Perception: Attention and perception normal.         Mood and Affect: Mood and affect normal.         Speech: Speech normal.         Behavior: Behavior normal. Behavior is cooperative.         Thought Content: Thought content normal.         Cognition and Memory: Cognition and memory normal.         Judgment: Judgment normal.        TIME      I spent 25 minutes on this date of service performing the following activities: obtaining history, performing examination, entering orders, documenting, preparing for visit,  obtaining / reviewing records and providing counseling and education.    Prior to this visit, if applicable, I reviewed all primary care, specialist, dietician and exercise physiology office visits that the patient has attended since his or her most recent visit with our program. Additionally, if applicable, I reviewed all recent labwork, imaging studies or procedures which the patient may have completed since his or her most recent visit in order to prepare for today's visit.     Cally Smith MD  2/20/2023

## 2023-02-20 ENCOUNTER — TELEMEDICINE (OUTPATIENT)
Dept: BARIATRICS/WEIGHT MGMT | Facility: CLINIC | Age: 53
End: 2023-02-20
Payer: COMMERCIAL

## 2023-02-20 VITALS — BODY MASS INDEX: 23.52 KG/M2 | WEIGHT: 137 LBS

## 2023-02-20 DIAGNOSIS — E55.9 VITAMIN D DEFICIENCY: ICD-10-CM

## 2023-02-20 DIAGNOSIS — R79.82 ELEVATED C-REACTIVE PROTEIN (CRP): ICD-10-CM

## 2023-02-20 DIAGNOSIS — R73.01 IMPAIRED FASTING GLUCOSE: Primary | ICD-10-CM

## 2023-02-20 DIAGNOSIS — R79.89 LOW VITAMIN B12 LEVEL: ICD-10-CM

## 2023-02-20 PROCEDURE — 3008F BODY MASS INDEX DOCD: CPT | Performed by: STUDENT IN AN ORGANIZED HEALTH CARE EDUCATION/TRAINING PROGRAM

## 2023-02-20 PROCEDURE — 99214 OFFICE O/P EST MOD 30 MIN: CPT | Mod: 95 | Performed by: STUDENT IN AN ORGANIZED HEALTH CARE EDUCATION/TRAINING PROGRAM

## 2023-02-20 NOTE — Clinical Note
Please call Yany to schedule REEVUE in [place] and then visit with RD in person in a few days following REEVUE and then move appt with me from April 7th to April 27th at noon. Thanks!

## 2023-02-20 NOTE — PATIENT INSTRUCTIONS
Continue Wegovy 2.4mg weekly and Phentermine 30mg daily.   [place] PDMP site accessed and assessed. Patient information reviewed.  No clinical concerns noted.  Get fasting bloodwork done: CBC, CMP, Lipids, TSH, Vitamin D/B12 and CRP.   Please stop biotin for 1 week prior to getting labs done and hold your Vitamin D and Complete Bf or 3 days prior to labs.   At your visit with RD, we will get a body composition measurement and if body fat is < 30%, we will start to wean down on medications.   The first medication we will taper is Phentermine - plan to reduce to 15mg for 2 weeks then reduce to 8mg for two weeks then stop.  Once you have been off Phentermine for 1 month, we can taper Wegovy. We would lower Wegovy to 1.7mg for 2-3 months and continue to monitor weight and body composition.     For your , [email or web address]    WHAT YOU SHOULD KNOW ABOUT REEVUE METABOLIC TESTING    As we discussed in today's visit, I am recommending you have further testing done at our [place] office so we can better understand your metabolism. Oftentimes if you have a history of dieting multiple times in the past, your metabolism may be slower than we would expect.     The REEVUE test measures the oxygen that your body consumes to calculate your Resting Energy Expenditure (REE), commonly referred to as a Resting Metabolic Rate (RMR). This will allow us to see if you have an abnormally low metabolic rate and can help us determine the optimal nutrition plan for you.     For 12 hours prior to the test, please do not exercise, eat or drink anything but water. This includes coffee or tea the morning of the test. During the test, you will place a nose clip on your nose and breath normally through a mouthpiece for approximately 10 minutes.     If this test is not covered by your insurance, the cost is $66.     Call [phone] to schedule your appointment at [address]

## 2023-02-20 NOTE — ASSESSMENT & PLAN NOTE
Continue Wegovy 2.4mg weekly and Phentermine 30mg daily.   [place] accessed and assessed. Patient information reviewed.  No clinical concerns noted.  Get fasting bloodwork done: CBC, CMP, Lipids, TSH, Vitamin D/B12 and CRP.   Please stop biotin for 1 week prior to getting labs done and hold your Vitamin D and Complete Bf or 3 days prior to labs.   At your visit with RD, we will get a body composition measurement and if body fat is < 30%, we will start to wean down on medications.   The first medication we will taper is Phentermine - plan to reduce to 15mg for 2 weeks then reduce to 8mg for two weeks then stop.  Once you have been off Phentermine for 1 month, we can taper Wegovy. We would lower Wegovy to 1.7mg for 2-3 months and continue to monitor weight and body composition.     For your , [email or web address]    WHAT YOU SHOULD KNOW ABOUT REEVUE METABOLIC TESTING    As we discussed in today's visit, I am recommending you have further testing done at our [place] office so we can better understand your metabolism. Oftentimes if you have a history of dieting multiple times in the past, your metabolism may be slower than we would expect.     The REEVUE test measures the oxygen that your body consumes to calculate your Resting Energy Expenditure (REE), commonly referred to as a Resting Metabolic Rate (RMR). This will allow us to see if you have an abnormally low metabolic rate and can help us determine the optimal nutrition plan for you.     For 12 hours prior to the test, please do not exercise, eat or drink anything but water. This includes coffee or tea the morning of the test. During the test, you will place a nose clip on your nose and breath normally through a mouthpiece for approximately 10 minutes.     If this test is not covered by your insurance, the cost is $66.     Call [phone] to schedule your appointment at [address]

## 2023-03-14 DIAGNOSIS — E66.811 CLASS 1 OBESITY WITHOUT SERIOUS COMORBIDITY WITH BODY MASS INDEX (BMI) OF 31.0 TO 31.9 IN ADULT, UNSPECIFIED OBESITY TYPE: ICD-10-CM

## 2023-03-14 RX ORDER — SEMAGLUTIDE 2.4 MG/.75ML
2.4 INJECTION, SOLUTION SUBCUTANEOUS
Qty: 3 ML | Refills: 1 | Status: SHIPPED | OUTPATIENT
Start: 2023-03-14 | End: 2023-04-18 | Stop reason: DRUGHIGH

## 2023-03-18 LAB
25(OH)D3+25(OH)D2 SERPL-MCNC: 60.4 NG/ML (ref 30–100)
ALBUMIN SERPL-MCNC: 4.1 G/DL (ref 3.8–4.9)
ALBUMIN/GLOB SERPL: 1.5 {RATIO} (ref 1.2–2.2)
ALP SERPL-CCNC: 72 IU/L (ref 44–121)
ALT SERPL-CCNC: 9 IU/L (ref 0–32)
AST SERPL-CCNC: 15 IU/L (ref 0–40)
BILIRUB SERPL-MCNC: 0.6 MG/DL (ref 0–1.2)
BUN SERPL-MCNC: 12 MG/DL (ref 6–24)
BUN/CREAT SERPL: 15 (ref 9–23)
CALCIUM SERPL-MCNC: 9.6 MG/DL (ref 8.7–10.2)
CHLORIDE SERPL-SCNC: 103 MMOL/L (ref 96–106)
CHOLEST SERPL-MCNC: 178 MG/DL (ref 100–199)
CO2 SERPL-SCNC: 25 MMOL/L (ref 20–29)
CREAT SERPL-MCNC: 0.78 MG/DL (ref 0.57–1)
CRP SERPL-MCNC: 2 MG/L (ref 0–10)
EGFRCR SERPLBLD CKD-EPI 2021: 90 ML/MIN/1.73
ERYTHROCYTE [DISTWIDTH] IN BLOOD BY AUTOMATED COUNT: 13 % (ref 11.7–15.4)
GLOBULIN SER CALC-MCNC: 2.7 G/DL (ref 1.5–4.5)
GLUCOSE SERPL-MCNC: 89 MG/DL (ref 70–99)
HBA1C MFR BLD: 5.2 % (ref 4.8–5.6)
HCT VFR BLD AUTO: 45 % (ref 34–46.6)
HDLC SERPL-MCNC: 72 MG/DL
HGB BLD-MCNC: 14.5 G/DL (ref 11.1–15.9)
LDLC SERPL CALC-MCNC: 95 MG/DL (ref 0–99)
MCH RBC QN AUTO: 27.5 PG (ref 26.6–33)
MCHC RBC AUTO-ENTMCNC: 32.2 G/DL (ref 31.5–35.7)
MCV RBC AUTO: 85 FL (ref 79–97)
PLATELET # BLD AUTO: 291 X10E3/UL (ref 150–450)
POTASSIUM SERPL-SCNC: 4.5 MMOL/L (ref 3.5–5.2)
PROT SERPL-MCNC: 6.8 G/DL (ref 6–8.5)
RBC # BLD AUTO: 5.28 X10E6/UL (ref 3.77–5.28)
SODIUM SERPL-SCNC: 144 MMOL/L (ref 134–144)
T4 FREE SERPL-MCNC: 1.52 NG/DL (ref 0.82–1.77)
TRIGL SERPL-MCNC: 56 MG/DL (ref 0–149)
TSH SERPL DL<=0.005 MIU/L-ACNC: 1.59 UIU/ML (ref 0.45–4.5)
VIT B12 SERPL-MCNC: 987 PG/ML (ref 232–1245)
VLDLC SERPL CALC-MCNC: 11 MG/DL (ref 5–40)
WBC # BLD AUTO: 5.2 X10E3/UL (ref 3.4–10.8)

## 2023-03-30 NOTE — PROGRESS NOTES
"DETAILS OF VISIT     Consulting Service:  [facility] Comprehensive Weight and Wellness Program - Dietitian    Referring Physician: Dr. Cally Smith.    PCP: Shady Otero MD    Reason for Consultation: Medical Weight Management      VISIT DESCRIPTION         Yany Mahoney is a 52 y.o. female here for a follow up nutrition evaluation.    Current Wt:   Wt Readings from Last 1 Encounters:   03/31/23 60.5 kg (133 lb 4.8 oz)     Ht:   Ht Readings from Last 1 Encounters:   03/31/23 1.626 m (5' 4\")        BMI: Body mass index is 22.88 kg/m².    Pt Goal Wt/Reasonable BW: 125#    Wt Readings from Last 5 Encounters:   03/31/23 60.5 kg (133 lb 4.8 oz)   02/20/23 62.1 kg (137 lb)   12/22/22 65.6 kg (144 lb 9.6 oz)   10/07/22 69.3 kg (152 lb 12.8 oz)   09/12/22 70.3 kg (154 lb 14.4 oz)     BMI Readings from Last 5 Encounters:   03/31/23 22.88 kg/m²   02/20/23 23.52 kg/m²   12/22/22 24.82 kg/m²   10/07/22 26.23 kg/m²   09/12/22 26.59 kg/m²     Weight: 60.5 kg (133 lb 4.8 oz)  Height: 162.6 cm (5' 4\")  BMI (Calculated): 22.9  % Body Fat: 27.1 %  REE: 1180      I met with Yany Mahoney today for Nutrition Counseling [Z71.3] and dietary education related to the following:    Z68.22- BMI 22.0-22.9, adult    Nutrition: Pt seen for follow up evaluation, last seen by me in May 2022. She has lost 55# in the last  Year through lifestyle modification - increased protein content, non-starchy veggies. She feels really well.     Recall: wake up 6am  Coffee while she gets lunches ready (1 c. coffee,  2 tsp chobani vanilla creamer)  1st meal 11a-12pm: cottage cheese w/ tomatoes or 1/2 ezekial muffin, 1 egg, some avocado or egg, avocado, tomato slices   2 pm: Earth Fed Muscle protein shake or salad w/  ~3-4 oz chicken (2 cups lettuce/tomatoes/cucumbers) + rice vinegar + olive oil +/- parm cheese  Sometimes will add this on mission carb balance wrap   4 pm: could be one of the items above ^ (3-4 oz protein)  6-7 pm: 3-4 oz chicken or " 6-8 oz fish, veggie    Plan:  1. Continue LGI plan  2. Trial of magnesium glycinate 200-400 mg/night for sleep    Hydration Goals Met: At least 64 oz water/day (water, Stefany water)    Physical Activity: Walks for 1 hour daily - 3 miles/day. Dedicated exercise at the gym 3x per week - lifting more now (2x per week with body pump), 1 HIIT workout per week.     Sleep: Sleep has improved over the last year - sometimes not always feeling well rested with family events.     Emotional Wellness: Spouse had 2 stents placed over the last year, increased concern for his health.     Handouts Provided: n/a    Time Spent with Patient for face to face office visit: 45 minutes    MEDICATIONS AND ALLERGIES     Current Outpatient Medications on File Prior to Visit   Medication Sig Dispense Refill   • amoxicillin (AMOXIL) 250 mg capsule Take 250 mg by mouth 3 (three) times a day.     • ascorbic acid (VITAMIN C ORAL) Take by mouth.     • biotin 10 mg tablet Take 10 mg by mouth daily.       • cholecalciferol, vitamin D3, 1,000 unit tablet Take by mouth.     • coenzyme Q10 (COQ10) 10 mg capsule Take 10 mg by mouth daily.       • green tea leaf extract (GREEN TEA ORAL) daily.     • Lactobacillus acidophilus (PROBIOTIC ORAL) daily.     • magnesium oxide (MAG-OX) 400 mg (241.3 mg magnesium) tablet Take 400 mg by mouth daily.     • multivitamin (THERAGRAN) tablet Take by mouth.     • omega-3 fatty acids-fish oil 300-1,000 mg capsule Take by mouth.     • WEGOVY 2.4 mg/0.75 mL pen injector Inject 0.75 mL (2.4 mg total) under the skin every (seven) 7 days. 3 mL 1   • ZINC-15 66 mg tablet Take by mouth.       No current facility-administered medications on file prior to visit.         Bee sting [bee venom protein (honey bee)]    CURRENT DIET        Plan: LGI 9103-9528  Meals: 2  Snacks: 0-1     RECOMMENDATIONS   BMI 22.0-22.9, adult  1. Continue LGI plan  2. Trial of magnesium glycinate 200-400 mg/night for sleep    Anish Morales, MS, RD,  [facility]  3/31/2023     Thank you very much for allowing us to participate in the care of your patient. Please do not hesitate to call or email if there are any questions

## 2023-03-31 ENCOUNTER — CLINICAL SUPPORT (OUTPATIENT)
Dept: BARIATRICS/WEIGHT MGMT | Facility: CLINIC | Age: 53
End: 2023-03-31
Payer: COMMERCIAL

## 2023-03-31 VITALS — HEIGHT: 64 IN | WEIGHT: 133.3 LBS | BODY MASS INDEX: 22.76 KG/M2

## 2023-03-31 DIAGNOSIS — Z71.3 DIETARY COUNSELING: ICD-10-CM

## 2023-03-31 PROBLEM — E66.811 CLASS 1 OBESITY WITHOUT SERIOUS COMORBIDITY WITH BODY MASS INDEX (BMI) OF 31.0 TO 31.9 IN ADULT: Status: RESOLVED | Noted: 2022-09-12 | Resolved: 2023-03-31

## 2023-03-31 PROCEDURE — 97803 MED NUTRITION INDIV SUBSEQ: CPT | Performed by: DIETITIAN, REGISTERED

## 2023-03-31 NOTE — PATIENT INSTRUCTIONS
"Problem List Items Addressed This Visit          Other    BMI 22.0-22.9, adult - Primary     1. Continue LGI plan  2. Trial of magnesium glycinate 200-400 mg/night for sleep         Dietary counseling     Weight: 60.5 kg (133 lb 4.8 oz)  Height: 162.6 cm (5' 4\")  BMI (Calculated): 22.9  % Body Fat: 27.1 %  REE: 1180      "

## 2023-03-31 NOTE — Clinical Note
"Weight: 60.5 kg (133 lb 4.8 oz) Height: 162.6 cm (5' 4\") BMI (Calculated): 22.9 % Body Fat: 27.1 % REE: 1180 "

## 2023-04-14 NOTE — TELEPHONE ENCOUNTER
Last Medical provider visit: 2/20/23 Sarah            Last Dietitian or EP visit: 3/31/23 Anish   Next visit: 4/27/23 Sarah  Comments: wegovy

## 2023-04-27 ENCOUNTER — TELEMEDICINE (OUTPATIENT)
Dept: BARIATRICS/WEIGHT MGMT | Facility: CLINIC | Age: 53
End: 2023-04-27
Payer: COMMERCIAL

## 2023-04-27 VITALS — WEIGHT: 131 LBS | BODY MASS INDEX: 22.49 KG/M2

## 2023-04-27 DIAGNOSIS — E55.9 VITAMIN D DEFICIENCY: ICD-10-CM

## 2023-04-27 DIAGNOSIS — R73.01 IMPAIRED FASTING GLUCOSE: ICD-10-CM

## 2023-04-27 DIAGNOSIS — R79.82 ELEVATED C-REACTIVE PROTEIN (CRP): Primary | ICD-10-CM

## 2023-04-27 PROCEDURE — 99215 OFFICE O/P EST HI 40 MIN: CPT | Mod: 95 | Performed by: STUDENT IN AN ORGANIZED HEALTH CARE EDUCATION/TRAINING PROGRAM

## 2023-04-27 PROCEDURE — 3008F BODY MASS INDEX DOCD: CPT | Performed by: STUDENT IN AN ORGANIZED HEALTH CARE EDUCATION/TRAINING PROGRAM

## 2023-04-27 ASSESSMENT — ENCOUNTER SYMPTOMS
ENDOCRINE NEGATIVE: 1
ALLERGIC/IMMUNOLOGIC NEGATIVE: 1
GASTROINTESTINAL NEGATIVE: 1
RESPIRATORY NEGATIVE: 1
MUSCULOSKELETAL NEGATIVE: 1
CARDIOVASCULAR NEGATIVE: 1
HEMATOLOGIC/LYMPHATIC NEGATIVE: 1
CONSTITUTIONAL NEGATIVE: 1
EYES NEGATIVE: 1
NEUROLOGICAL NEGATIVE: 1
PSYCHIATRIC NEGATIVE: 1

## 2023-04-27 NOTE — Clinical Note
Please call patient and schedule her with [facility] in 2-3 months in person. Please remind her she needs to be in person for body composition measurement.

## 2023-04-27 NOTE — PROGRESS NOTES
COMPREHENSIVE WEIGHT AND WELLNESS FOLLOW UP VISIT   Verification of Patient Location:  The patient affirms they are currently located in the following state: [place]     Audio and Video Encounter   Adilia, my name is Cally Smith MD.  Before we proceed, can you please verify your identification by telling me your full name and date of birth?  Can you tell me who is in the room with you?    You and I are about to have a telemedicine check-in or visit because you have requested it.  This is a live video-conference.  I am a real person, speaking to you in real time.  There is no one else with me on the video-conference. I am not recording this conversation and I am asking you not to record it.  This telemedicine visit will be billed to your health insurance or you, if you are self-insured.  You understand you will be responsible for any copayments or coinsurances that apply to your telemedicine visit.  Communication platform used for this encounter:  [employer] Video Visit (Epic Video Client)       Before starting our telemedicine visit, I am required to get your consent for this virtual check-in or visit by telemedicine. Do you consent?     Patient Response to Request for Consent: Yes     HISTORY OF PRESENT ILLNESS - ASSESSMENT AND PLAN      CC:   Chief Complaint   Patient presents with   • Weight Management     Yany Mahoney is a 52 y.o. female following up on lifestyle management and weight loss as adjunctive treatment strategies for vitamin D deficiency, colon polyps, malignant melanoma, kidney stones, anxiety, sleep apnea, seasonal allergies, impaired fasting glucose with history of gestational diabetes, and obesity.      Body Composition Trend  Date Weight (Pounds) Body Fat %  Waist (Inches)   03/07/22 188.4 38.6 36   06/09/22 173.7 37 34   08/19/22 157.2 TELE TELE   10/07/22 152.8 31.6 30   12/22/22 144.6 TELE TELE   02/20/23 137 TELE TELE   03/31/23 - RD Visit 133.4 27.1    04/27/23 131 TELE TELE      Medications used to support lifestyle modifications: Wegovy 1.7mg weekly     Supplements used to support lifestyle modifications: Vitamin C, Biotin, Vitamin D, CoQ10, Green Tea Extract, Probiotic, Magenesium, Multivitamin, Omega 3 Fatty Acid     Interval history:   New notes, labs or studies reviewed since last visit:   Lab Results   Component Value Date    HGBA1C 5.2 03/17/2023     Lab Results   Component Value Date    WBC 5.2 03/17/2023    HGB 14.5 03/17/2023    HCT 45.0 03/17/2023    MCV 85 03/17/2023     03/17/2023     Lab Results   Component Value Date    GLUCOSE 89 03/17/2023    CALCIUM 8.9 08/07/2015     03/17/2023    K 4.5 03/17/2023    CO2 25 03/17/2023     03/17/2023    BUN 12 03/17/2023    CREATININE 0.78 03/17/2023     Lab Results   Component Value Date    ALT 9 03/17/2023    AST 15 03/17/2023    ALKPHOS 72 03/17/2023    BILITOT 0.6 03/17/2023     Lab Results   Component Value Date    TSH 1.590 03/17/2023     Lab Results   Component Value Date    WBRN95LM 60.4 03/17/2023     Lab Results   Component Value Date    CHOL 178 03/17/2023    CHOL 206 (H) 09/23/2020    CHOL 196 09/19/2019     Lab Results   Component Value Date    HDL 72 03/17/2023    HDL 99 09/23/2020     09/19/2019     Lab Results   Component Value Date    LDLCALC 95 03/17/2023    LDLCALC 98 09/23/2020    LDLCALC 84 09/19/2019     Lab Results   Component Value Date    TRIG 56 03/17/2023    TRIG 51 09/23/2020    TRIG 47 09/19/2019     Vitamin B12 987  CRP 2    03/31 RD Anish  Nutrition: Pt seen for follow up evaluation, last seen by me in May 2022. She has lost 55# in the last  Year through lifestyle modification - increased protein content, non-starchy veggies. She feels really well.      Recall: wake up 6am  Coffee while she gets lunches ready (1 c. coffee,  2 tsp chobani vanilla creamer)  1st meal 11a-12pm: cottage cheese w/ tomatoes or 1/2 ezekial muffin, 1 egg, some avocado or egg, avocado, tomato slices   2  pm: Earth Fed Muscle protein shake or salad w/  ~3-4 oz chicken (2 cups lettuce/tomatoes/cucumbers) + rice vinegar + olive oil +/- parm cheese  Sometimes will add this on mission carb balance wrap   4 pm: could be one of the items above ^ (3-4 oz protein)  6-7 pm: 3-4 oz chicken or 6-8 oz fish, veggie     Plan:  1. Continue LGI plan  2. Trial of magnesium glycinate 200-400 mg/night for sleep     Hydration Goals Met: At least 64 oz water/day (water, Stefany water)     Physical Activity: Walks for 1 hour daily - 3 miles/day. Dedicated exercise at the gym 3x per week - lifting more now (2x per week with body pump), 1 HIIT workout per week.      Sleep: Sleep has improved over the last year - sometimes not always feeling well rested with family events.      Emotional Wellness: Spouse had 2 stents placed over the last year, increased concern for his health.     My last plan  Continue Wegovy 2.4mg weekly and Phentermine 30mg daily.      Get fasting bloodwork done: CBC, CMP, Lipids, TSH, Vitamin D/B12 and CRP.   Please stop biotin for 1 week prior to getting labs done and hold your Vitamin D and Complete B or 3 days prior to labs.      Please schedule a visit with your nutritionist, at that visit, you will get a body composition measurement and if body fat is < 30%, we can start to wean down on medications.   The first medication we will taper is Phentermine - plan to reduce to 15mg for 2 weeks then reduce to 8mg for two weeks then stop.  Once you have been off Phentermine for 1 month, we can taper Wegovy. We would lower Wegovy to 1.7mg for 2-3 months and continue to monitor weight and body composition.       Today's update: She has reduced to Wegovy 1.7mg, she has completed 2 injections thusfar. She has noticed she is craving more sweets/carbs than before. Cravings are not every day, but more than before on Wegovy 2.4mg weekly. She does feel like she naturally has a sweet tooth. One dark ayleen kiss does suffice to satisfy  "her craving. She noticed she had a craving for ice cream the other day while she was serving her daughter. She had two bites and was like \"wow this is so good.\" She does feel like her tastebuds have changed - she had muddy iliana chex mix the other day at her son's game. She had a few and felt like it did not taste like she remembered it. It just wasn't satisfying. When she cant get to the gym she tries to prioritize walking after dinner.       Status of the weight related conditions, relevant history, and new concerns:  Assessment   Problem List Items Addressed This Visit        Endocrine/Metabolic    Vitamin D deficiency     Resolved on recent labs, continue current supplementation.          Impaired fasting glucose     Resolved on recent labs.             Other    Elevated C-reactive protein (CRP) - Primary     Resolved on last labs.          BMI 22.0-22.9, adult     Continue Wegovy 1.7mg weekly to complete this month. Let me know after your 4th injection if you want to reduce to 1.0mg next month or change to 1.0mg.   During allergy season, try to blow the pollen out of your hair at night before bed or wash your hair at night before bed. Consider changing your pillow case on nights when you don't wash your hair. Try saline nasal spray at night before bed to wash out pollen.   To learn more about benefits of Magnesium: [email or web address]  Continue to focus on food type, timing, and teamwork.    Continue adequate protein intake to reduce hunger.  Continue low insulin inducing foods to balance insulin and blood sugar.    Continue adequate cardiovascular activity, resistance training and non-exercise activity daily.    Continue to balance stress with relaxation techniques and aim for adequate sleep nightly.    Would continue to check A1c every 3 months and continue body composition measurements every 3 months as well during taper process and would slow taper if body fat regain or A1c " rise that is not explained by clear lifestyle change.     Ultimate goals for a woman would be body fat between 25 and 30% and waist between 25 and 30 inches.   An individual's set point will have some contribution from genetics and the best endpoint for weight, body fat percentage and waist is the one that can be comfortably maintained throughout life.   Weight regain is more damaging to health because weight regain is due to increased body fat and this is what causes adverse health related outcomes.    As you approach your goal weight and consider reducing medications you are using to help with weight loss, I recommend the followin. Invest in a body composition scale for home, brands I like are Fit Track or Withings. Your weight does not matter as much as your body fat percentage. Excess body fat leads to hormone imbalance, insulin resistance and these changes are what cause weight regain to happen more easily. Goal body fat is less than 30% as this is when your body is going to be the least likely to shift back into fat storing mode.     2. Review your lifestyle habits and ask yourself, am I doing the below things CONSISTENTLY? If not, figure out how to make all of these habits work in your life. These are the habits that will preserve your hormone balance in favor of maintaining your weight loss and will prevent weight regain so that your body will self regulate without the need for medication.      ·        Never consume calories for more than 12 hours a day (example: if you put cream in your coffee at 7am, you should be finished eating by 7pm)  ·        Maximize your fasting window overnight towards 14 to 16 hours as often as you can  ·        Eat carbohydrates with a fat and or a protein, never eat carbs by themselves (example: never eat an apple by itself eat it with peanut butter)  ·        Start your meals with fiber then eat protein and fat. If you are going to have a higher sugar carbohydrate, eat  this last.  ·        Drink 64 ounces of water a day.   ·        Avoid all artificial sweeteners and processed, packaged foods.   ·        Limit alcoholic beverages to no more than 2-3 per week.   ·        Walk at least 7-10K steps per day, never sit for more than 45 minutes without getting up, and move for 10 to 15 minutes after every single meal.  ·        Cardiovascular activity with at least 150 minutes each week.   ·        Muscle building strength training activity for 3 sessions of at least 30 minutes each week - resistance bands, body weight or dumbbells - for metabolism maintenance.   ·        8 hours of uninterrupted sleep a night - consider a 1 hour no screen time, wind down routine at night before bed to let your nervous system calm down and prepare for restful sleep.    ·        Routine stress reduction or mindfulness practice to decrease cortisol (stress hormone) release in the body.      To prevent weight regain, do not change your habits! If you have weight regain, really ask yourself, have I changed anything? Even small habit changes (more fruit, fewer steps per day), can add up over time. 5lbs of weight regain is true regain, so if you find yourself in this situation in the future, do not hesitate to reach out. It is easier for us to work together to get 5lbs off than 50lbs!                          Current Outpatient Medications on File Prior to Visit   Medication Sig Dispense Refill   • amoxicillin (AMOXIL) 250 mg capsule Take 250 mg by mouth 3 (three) times a day.     • ascorbic acid (VITAMIN C ORAL) Take by mouth.     • biotin 10 mg tablet Take 10 mg by mouth daily.       • cholecalciferol, vitamin D3, 1,000 unit tablet Take by mouth.     • coenzyme Q10 (COQ10) 10 mg capsule Take 10 mg by mouth daily.       • green tea leaf extract (GREEN TEA ORAL) daily.     • Lactobacillus acidophilus (PROBIOTIC ORAL) daily.     • magnesium oxide (MAG-OX) 400 mg (241.3 mg magnesium) tablet Take 400 mg by mouth  daily.     • multivitamin (THERAGRAN) tablet Take by mouth.     • omega-3 fatty acids-fish oil 300-1,000 mg capsule Take by mouth.     • semaglutide, weight loss, (WEGOVY) 1.7 mg/0.75 mL subcutaneous injection Inject 1.7 mg under the skin every (seven) 7 days. 0.75 mL 0   • ZINC-15 66 mg tablet Take by mouth.       No current facility-administered medications on file prior to visit.          Bee sting [bee venom protein (honey bee)]       Review of Systems   Constitutional: Negative.    HENT: Negative.    Eyes: Negative.    Respiratory: Negative.    Cardiovascular: Negative.    Gastrointestinal: Negative.    Endocrine: Negative.    Genitourinary: Negative.    Musculoskeletal: Negative.    Skin: Negative.    Allergic/Immunologic: Negative.    Neurological: Negative.    Hematological: Negative.    Psychiatric/Behavioral: Negative.         PHYSICAL EXAMINATION      Visit Vitals  Wt 59.4 kg (131 lb)   BMI 22.49 kg/m²      Body mass index is 22.49 kg/m².    BP Readings from Last 3 Encounters:   10/07/22 114/76   09/12/22 108/70   06/17/22 110/70     Wt Readings from Last 3 Encounters:   04/27/23 59.4 kg (131 lb)   03/31/23 60.5 kg (133 lb 4.8 oz)   02/20/23 62.1 kg (137 lb)       Physical Exam  Vitals signs reviewed.   Constitutional:       Appearance: Normal appearance. Well-developed.   HENT:      Head: Normocephalic and atraumatic.      Right Ear: External ear normal.      Left Ear: External ear normal.   Eyes:      General: Lids are normal.      Extraocular Movements: Extraocular movements intact.      Conjunctiva/sclera: Conjunctivae normal.   Pulmonary:      Effort: Pulmonary effort is normal.   Neurological:      General: No focal deficit present.      Mental Status: Alert and oriented to person, place, and time. Mental status is at baseline.   Psychiatric:         Attention and Perception: Attention and perception normal.         Mood and Affect: Mood and affect normal.         Speech: Speech normal.          Behavior: Behavior normal. Behavior is cooperative.         Thought Content: Thought content normal.         Cognition and Memory: Cognition and memory normal.         Judgment: Judgment normal.        TIME      I spent 40 minutes on this date of service performing the following activities: obtaining history, performing examination, entering orders, documenting, preparing for visit, obtaining / reviewing records and providing counseling and education.    Prior to this visit, if applicable, I reviewed all primary care, specialist, dietician and exercise physiology office visits that the patient has attended since his or her most recent visit with our program. Additionally, if applicable, I reviewed all recent labwork, imaging studies or procedures which the patient may have completed since his or her most recent visit in order to prepare for today's visit.     Cally Smith MD  4/27/2023

## 2023-04-27 NOTE — PATIENT INSTRUCTIONS
Continue Wegovy 1.7mg weekly to complete this month. Let me know after your 4th injection if you want to reduce to 1.0mg next month or change to 1.0mg.   During allergy season, try to blow the pollen out of your hair at night before bed or wash your hair at night before bed. Consider changing your pillow case on nights when you don't wash your hair. Try saline nasal spray at night before bed to wash out pollen.   To learn more about benefits of Magnesium: https://www.[employer]/health/magnesium-glycinate  Continue to focus on food type, timing, and teamwork.    Continue adequate protein intake to reduce hunger.  Continue low insulin inducing foods to balance insulin and blood sugar.    Continue adequate cardiovascular activity, resistance training and non-exercise activity daily.    Continue to balance stress with relaxation techniques and aim for adequate sleep nightly.    Would continue to check A1c every 3 months and continue body composition measurements every 3 months as well during taper process and would slow taper if body fat regain or A1c rise that is not explained by clear lifestyle change.     Ultimate goals for a woman would be body fat between 25 and 30% and waist between 25 and 30 inches.   An individual's set point will have some contribution from genetics and the best endpoint for weight, body fat percentage and waist is the one that can be comfortably maintained throughout life.   Weight regain is more damaging to health because weight regain is due to increased body fat and this is what causes adverse health related outcomes.    As you approach your goal weight and consider reducing medications you are using to help with weight loss, I recommend the following:     Invest in a body composition scale for home, brands I like are Fit Track or Withings. Your weight does not matter as much as your body fat percentage. Excess body fat leads to hormone imbalance, insulin resistance and these changes  are what cause weight regain to happen more easily. Goal body fat is less than 30% as this is when your body is going to be the least likely to shift back into fat storing mode.     Review your lifestyle habits and ask yourself, am I doing the below things CONSISTENTLY? If not, figure out how to make all of these habits work in your life. These are the habits that will preserve your hormone balance in favor of maintaining your weight loss and will prevent weight regain so that your body will self regulate without the need for medication.      ·        Never consume calories for more than 12 hours a day (example: if you put cream in your coffee at 7am, you should be finished eating by 7pm)  ·        Maximize your fasting window overnight towards 14 to 16 hours as often as you can  ·        Eat carbohydrates with a fat and or a protein, never eat carbs by themselves (example: never eat an apple by itself eat it with peanut butter)  ·        Start your meals with fiber then eat protein and fat. If you are going to have a higher sugar carbohydrate, eat this last.  ·        Drink 64 ounces of water a day.   ·        Avoid all artificial sweeteners and processed, packaged foods.   ·        Limit alcoholic beverages to no more than 2-3 per week.   ·        Walk at least 7-10K steps per day, never sit for more than 45 minutes without getting up, and move for 10 to 15 minutes after every single meal.  ·        Cardiovascular activity with at least 150 minutes each week.   ·        Muscle building strength training activity for 3 sessions of at least 30 minutes each week - resistance bands, body weight or dumbbells - for metabolism maintenance.   ·        8 hours of uninterrupted sleep a night - consider a 1 hour no screen time, wind down routine at night before bed to let your nervous system calm down and prepare for restful sleep.    ·        Routine stress reduction or mindfulness practice to decrease cortisol (stress  hormone) release in the body.      To prevent weight regain, do not change your habits! If you have weight regain, really ask yourself, have I changed anything? Even small habit changes (more fruit, fewer steps per day), can add up over time. 5lbs of weight regain is true regain, so if you find yourself in this situation in the future, do not hesitate to reach out. It is easier for us to work together to get 5lbs off than 50lbs!

## 2023-04-27 NOTE — ASSESSMENT & PLAN NOTE
Continue Wegovy 1.7mg weekly to complete this month. Let me know after your 4th injection if you want to reduce to 1.0mg next month or change to 1.0mg.   During allergy season, try to blow the pollen out of your hair at night before bed or wash your hair at night before bed. Consider changing your pillow case on nights when you don't wash your hair. Try saline nasal spray at night before bed to wash out pollen.   To learn more about benefits of Magnesium: https://www.[employer]/health/magnesium-glycinate  Continue to focus on food type, timing, and teamwork.    Continue adequate protein intake to reduce hunger.  Continue low insulin inducing foods to balance insulin and blood sugar.    Continue adequate cardiovascular activity, resistance training and non-exercise activity daily.    Continue to balance stress with relaxation techniques and aim for adequate sleep nightly.    Would continue to check A1c every 3 months and continue body composition measurements every 3 months as well during taper process and would slow taper if body fat regain or A1c rise that is not explained by clear lifestyle change.     Ultimate goals for a woman would be body fat between 25 and 30% and waist between 25 and 30 inches.   An individual's set point will have some contribution from genetics and the best endpoint for weight, body fat percentage and waist is the one that can be comfortably maintained throughout life.   Weight regain is more damaging to health because weight regain is due to increased body fat and this is what causes adverse health related outcomes.    As you approach your goal weight and consider reducing medications you are using to help with weight loss, I recommend the followin. Invest in a body composition scale for home, brands I like are Fit Track or Withings. Your weight does not matter as much as your body fat percentage. Excess body fat leads to hormone imbalance, insulin resistance and these changes  are what cause weight regain to happen more easily. Goal body fat is less than 30% as this is when your body is going to be the least likely to shift back into fat storing mode.     2. Review your lifestyle habits and ask yourself, am I doing the below things CONSISTENTLY? If not, figure out how to make all of these habits work in your life. These are the habits that will preserve your hormone balance in favor of maintaining your weight loss and will prevent weight regain so that your body will self regulate without the need for medication.      ·        Never consume calories for more than 12 hours a day (example: if you put cream in your coffee at 7am, you should be finished eating by 7pm)  ·        Maximize your fasting window overnight towards 14 to 16 hours as often as you can  ·        Eat carbohydrates with a fat and or a protein, never eat carbs by themselves (example: never eat an apple by itself eat it with peanut butter)  ·        Start your meals with fiber then eat protein and fat. If you are going to have a higher sugar carbohydrate, eat this last.  ·        Drink 64 ounces of water a day.   ·        Avoid all artificial sweeteners and processed, packaged foods.   ·        Limit alcoholic beverages to no more than 2-3 per week.   ·        Walk at least 7-10K steps per day, never sit for more than 45 minutes without getting up, and move for 10 to 15 minutes after every single meal.  ·        Cardiovascular activity with at least 150 minutes each week.   ·        Muscle building strength training activity for 3 sessions of at least 30 minutes each week - resistance bands, body weight or dumbbells - for metabolism maintenance.   ·        8 hours of uninterrupted sleep a night - consider a 1 hour no screen time, wind down routine at night before bed to let your nervous system calm down and prepare for restful sleep.    ·        Routine stress reduction or mindfulness practice to decrease cortisol (stress  hormone) release in the body.      To prevent weight regain, do not change your habits! If you have weight regain, really ask yourself, have I changed anything? Even small habit changes (more fruit, fewer steps per day), can add up over time. 5lbs of weight regain is true regain, so if you find yourself in this situation in the future, do not hesitate to reach out. It is easier for us to work together to get 5lbs off than 50lbs!

## 2023-06-23 ENCOUNTER — OFFICE VISIT (OUTPATIENT)
Dept: PRIMARY CARE | Facility: CLINIC | Age: 53
End: 2023-06-23
Payer: COMMERCIAL

## 2023-06-23 VITALS
SYSTOLIC BLOOD PRESSURE: 108 MMHG | HEART RATE: 70 BPM | DIASTOLIC BLOOD PRESSURE: 54 MMHG | WEIGHT: 135 LBS | OXYGEN SATURATION: 98 % | TEMPERATURE: 97.7 F | RESPIRATION RATE: 16 BRPM | HEIGHT: 64 IN | BODY MASS INDEX: 23.05 KG/M2

## 2023-06-23 DIAGNOSIS — K63.5 HYPERPLASTIC POLYP OF DESCENDING COLON: Primary | ICD-10-CM

## 2023-06-23 DIAGNOSIS — R79.82 ELEVATED C-REACTIVE PROTEIN (CRP): ICD-10-CM

## 2023-06-23 DIAGNOSIS — Z86.32 HISTORY OF GESTATIONAL DIABETES: ICD-10-CM

## 2023-06-23 DIAGNOSIS — Z00.00 PREVENTATIVE HEALTH CARE: ICD-10-CM

## 2023-06-23 DIAGNOSIS — Z80.3 FAMILY HISTORY OF MALIGNANT NEOPLASM OF BREAST: ICD-10-CM

## 2023-06-23 DIAGNOSIS — R92.8 ABNORMAL MAMMOGRAM: ICD-10-CM

## 2023-06-23 DIAGNOSIS — R73.01 IMPAIRED FASTING GLUCOSE: ICD-10-CM

## 2023-06-23 DIAGNOSIS — E55.9 VITAMIN D DEFICIENCY: ICD-10-CM

## 2023-06-23 PROCEDURE — 99396 PREV VISIT EST AGE 40-64: CPT | Performed by: INTERNAL MEDICINE

## 2023-06-23 PROCEDURE — 3008F BODY MASS INDEX DOCD: CPT | Performed by: INTERNAL MEDICINE

## 2023-06-23 ASSESSMENT — ENCOUNTER SYMPTOMS
CHILLS: 0
TROUBLE SWALLOWING: 0
TREMORS: 0
ARTHRALGIAS: 0
DYSURIA: 0
COUGH: 0
FEVER: 0
CONSTIPATION: 0
BRUISES/BLEEDS EASILY: 0
SLEEP DISTURBANCE: 1

## 2023-06-23 NOTE — PROGRESS NOTES
Subjective      Patient ID: Yany Mahoney is a 52 y.o. female.    Physical      Weight losss      The following have been reviewed and updated as appropriate in this visit:   Tobacco  Allergies  Meds  Problems  Med Hx  Surg Hx  Fam Hx  Soc   Hx      Review of Systems   Constitutional: Negative for chills and fever.   HENT: Negative for trouble swallowing.    Eyes: Negative for visual disturbance.   Respiratory: Negative for cough.    Cardiovascular: Negative for chest pain.   Gastrointestinal: Negative for constipation.   Endocrine: Negative for polyuria.   Genitourinary: Negative for dysuria.   Musculoskeletal: Negative for arthralgias.   Skin: Negative for rash.   Allergic/Immunologic: Negative for food allergies.   Neurological: Negative for tremors.   Hematological: Does not bruise/bleed easily.   Psychiatric/Behavioral: Positive for sleep disturbance.       Objective     Physical Exam  Vitals and nursing note reviewed.   Constitutional:       General: She is not in acute distress.  HENT:      Head: Normocephalic.   Eyes:      General: No scleral icterus.  Cardiovascular:      Rate and Rhythm: Normal rate and regular rhythm.   Pulmonary:      Effort: Pulmonary effort is normal.      Breath sounds: Normal breath sounds.   Abdominal:      General: Bowel sounds are normal.      Palpations: Abdomen is soft.   Musculoskeletal:      Cervical back: Neck supple.      Right lower leg: No edema.      Left lower leg: No edema.   Skin:     General: Skin is warm.   Neurological:      Mental Status: She is alert and oriented to person, place, and time.   Psychiatric:         Behavior: Behavior normal.         Thought Content: Thought content normal.         Judgment: Judgment normal.         Assessment/Plan   Problem List Items Addressed This Visit        Digestive    Hyperplastic polyp of descending colon - Primary     Colonoscopy 9/2019    Dr Boles one polyp removed      Follow up 5 years               "Endocrine/Metabolic    Vitamin D deficiency    Impaired fasting glucose       Other    History of gestational diabetes    Preventative health care     Exercise 30 minutes EVERY DAY! This helps by adding 9 years to life expectancy.    Consider adding yoga or juan chi for balance to prevent falls and strengthen legs. Planking helps to strengthen core. Water aerobics are also a great alternative for exercise.    Protect hearing when engaging in loud activity like blowing the leaves or mowing the grass to prevent hearing loss. This helps to prevent dementia.     Dental cleaning every 6 months    Vision check every 2 -3 years. (Increase to yearly at age 60.)     Last colonoscopy 2019, repeat 2024    TDAP due         Covid booster/extension as recommended by CDC      Recommend Flu vaccine in Fall 2023    Recommend reading \" How Not To Diet\" by Dr. Don Burton.    Cutting your risk of Dementia    1.Controlling blood pressure.  2. Protect your hearing.  3.Limit alcohol use to 2 drinks a day (Less than 2 oz).  4. Avoid trauma to head.  5. Do not smoke.  6. Exercising at least 30 minutes daily.                 Abnormal mammogram    Relevant Orders    BI DIAGNOSTIC MAMMOGRAM BILATERAL (TOMOSYNTHESIS)    Family history of malignant neoplasm of breast    Relevant Orders    BI DIAGNOSTIC MAMMOGRAM BILATERAL (TOMOSYNTHESIS)    RESOLVED: Elevated C-reactive protein (CRP)     "

## 2023-06-23 NOTE — ASSESSMENT & PLAN NOTE
"Exercise 30 minutes EVERY DAY! This helps by adding 9 years to life expectancy.    Consider adding yoga or juan chi for balance to prevent falls and strengthen legs. Planking helps to strengthen core. Water aerobics are also a great alternative for exercise.    Protect hearing when engaging in loud activity like blowing the leaves or mowing the grass to prevent hearing loss. This helps to prevent dementia.     Dental cleaning every 6 months    Vision check every 2 -3 years. (Increase to yearly at age 60.)     Last colonoscopy 2019, repeat 2024    TDAP due         Covid booster/extension as recommended by CDC      Recommend Flu vaccine in Fall 2023    Recommend reading \" How Not To Diet\" by Dr. Don Burton.    Cutting your risk of Dementia    1.Controlling blood pressure.  2. Protect your hearing.  3.Limit alcohol use to 2 drinks a day (Less than 2 oz).  4. Avoid trauma to head.  5. Do not smoke.  6. Exercising at least 30 minutes daily.          "

## 2023-06-23 NOTE — PATIENT INSTRUCTIONS
"Problem List Items Addressed This Visit          Digestive    Hyperplastic polyp of descending colon - Primary     Colonoscopy 9/2019    Dr Boles one polyp removed      Follow up 5 years                Endocrine/Metabolic    Vitamin D deficiency    Impaired fasting glucose       Other    History of gestational diabetes    Preventative health care     Exercise 30 minutes EVERY DAY! This helps by adding 9 years to life expectancy.    Consider adding yoga or juan chi for balance to prevent falls and strengthen legs. Planking helps to strengthen core. Water aerobics are also a great alternative for exercise.    Protect hearing when engaging in loud activity like blowing the leaves or mowing the grass to prevent hearing loss. This helps to prevent dementia.     Dental cleaning every 6 months    Vision check every 2 -3 years. (Increase to yearly at age 60.)     Last colonoscopy 2019, repeat 2024         Covid booster/extension as recommended by CDC      Recommend Flu vaccine in Fall 2023    Recommend reading \" How Not To Diet\" by Dr. Don Burton.    Cutting your risk of Dementia    1.Controlling blood pressure.  2. Protect your hearing.  3.Limit alcohol use to 2 drinks a day (Less than 2 oz).  4. Avoid trauma to head.  5. Do not smoke.  6. Exercising at least 30 minutes daily.                   Abnormal mammogram    Relevant Orders    BI DIAGNOSTIC MAMMOGRAM BILATERAL (TOMOSYNTHESIS)    Family history of malignant neoplasm of breast    Relevant Orders    BI DIAGNOSTIC MAMMOGRAM BILATERAL (TOMOSYNTHESIS)    RESOLVED: Elevated C-reactive protein (CRP)      "

## 2023-06-27 NOTE — PROGRESS NOTES
"HISTORY OF PRESENT ILLNESS        Yany Mahoney is a 52 y.o. female following up on lifestyle management and weight loss as adjunctive treatment strategies for  vitamin D deficiency, colon polyps, malignant melanoma, kidney stones, anxiety, sleep apnea, seasonal allergies, impaired fasting glucose with history of gestational diabetes, and obesity.       Body Composition Trend  Date Weight (Pounds) Body Fat %  Waist (Inches)   03/07/22 188.4 38.6 36   06/09/22 173.7 37 34   08/19/22 157.2 TELE TELE   10/07/22 152.8 31.6 30   12/22/22 144.6 TELE TELE   02/20/23 137 TELE TELE   03/31/23 - RD Visit 133.4 27.1     04/27/23 131 TELE TELE     Date Weight (Pounds) Body Fat %  Waist (Inches)   6/28/23  134.9/BMI 22.9 26.6/REE 1188 32.5                        Interval history:   Chart reviewed since our last visit.   3/31/23 VICENTE Oconnell  4/27/23 MOHAN Scott Today's update: She has reduced to Wegovy 1.7mg, she has completed 2 injections thusfar. She has noticed she is craving more sweets/carbs than before. Cravings are not every day, but more than before on Wegovy 2.4mg weekly. She does feel like she naturally has a sweet tooth. One dark ayleen kiss does suffice to satisfy her craving. She noticed she had a craving for ice cream the other day while she was serving her daughter. She had two bites and was like \"wow this is so good.\" She does feel like her tastebuds have changed - she had muddy iliana chex mix the other day at her son's game. She had a few and felt like it did not taste like she remembered it. It just wasn't satisfying. When she cant get to the gym she tries to prioritize walking after dinner.   Continue Wegovy 1.7mg weekly to complete this month. Let me know after your 4th injection if you want to reduce to 1.0mg next month or change to 1.0mg.   During allergy season, try to blow the pollen out of your hair at night before bed or wash your hair at night before bed. Consider changing your pillow case on nights " when you don't wash your hair. Try saline nasal spray at night before bed to wash out pollen.   To learn more about benefits of Magnesium: https://www.[employer]/health/magnesium-glycinate  Would continue to check A1c every 3 months and continue body composition measurements every 3 months as well during taper process and would slow taper if body fat regain or A1c rise that is not explained by clear lifestyle change.     TODAY'S VISIT:   Medication(s) used to support lifestyle modifications:   Wegovy 2.4 mg and doing well and when she went down really hungry.    Taking medications as prescribed:yes  Side effects: none  Medication effect: Decreasing Hunger  Supplements used to support lifestyle modification:Vitamin D, Omega 3, Magnesium and Multivitamin    Patient concerns/questions: She said she is hungry on 1.7 Wegovy and we increased to Wegovy 2.4.    Updates on weight related conditions: none    Nutrition:  Seeing the dietitian regularly, Fasting for 12 hours or more most days, Getting 64+ oz water daily, Hitting protein goals most days, 2 or more cups of veggies daily, Not eating naked carbs, Planning ahead, appropriate portions    Physical activity:  Four days / week and added less cardio and more weight training.  Feels better when she does cardio.  Cardio 45-60 minutes, weight training 1.20 hours each one 3-4 times/week.     Sleep:   Sleep is restful 6.5 to 7 hours/ night    Stress:   no stress. two kids busy with them. 10 minutes mindfulness practice.          Current Outpatient Medications on File Prior to Visit   Medication Sig Dispense Refill   • ascorbic acid (VITAMIN C ORAL) Take by mouth.     • biotin 10 mg tablet Take 10 mg by mouth daily.       • cholecalciferol, vitamin D3, 1,000 unit tablet Take by mouth.     • coenzyme Q10 (COQ10) 10 mg capsule Take 10 mg by mouth daily.       • green tea leaf extract (GREEN TEA ORAL) daily.     • Lactobacillus acidophilus (PROBIOTIC ORAL) daily.     •  "magnesium oxide (MAG-OX) 400 mg (241.3 mg magnesium) tablet Take 400 mg by mouth daily.     • multivitamin (THERAGRAN) tablet Take by mouth.     • omega-3 fatty acids-fish oil 300-1,000 mg capsule Take by mouth.     • semaglutide, weight loss, (WEGOVY) 2.4 mg/0.75 mL subcutaneous injection Inject 2.4 mg under the skin every (seven) 7 days. 3 mL 1   • ZINC-15 66 mg tablet Take by mouth.       No current facility-administered medications on file prior to visit.          Bee sting [bee venom protein (honey bee)]         PHYSICAL EXAMINATION      Visit Vitals  /68 (BP Location: Left upper arm, Patient Position: Sitting)   Pulse 71   Resp 18   Ht 1.626 m (5' 4\")   Wt 61.2 kg (134 lb 14.4 oz)   LMP 05/15/2022   SpO2 99%   BMI 23.16 kg/m²       Physical Exam  Physical Exam  Constitutional:       General: not in acute distress.     Appearance: not diaphoretic.   HENT:      Head: Normocephalic and atraumatic.      Nose: Nose normal.      Mouth/Throat:      Mouth:    Eyes:      Conjunctiva/sclera: Conjunctivae normal.      Pupils: Pupils are equal, round, and reactive to light.   Pulmonary:      Effort: Pulmonary effort is normal.   Musculoskeletal:      Cervical back: Neck supple.   Neurological:      Mental Status: alert.      Cranial Nerves: No cranial nerve deficit.            ASSESSMENT AND PLAN         Impaired fasting glucose  Lab Results   Component Value Date    HGBA1C 5.2 03/17/2023     Repeat A1c ordered.     BMI 22.0-22.9, adult  12 hour fast daily, Drinking 64 oz or more of water daily, Eating recommended amount of protein , Meeting vegetable fiber intake recommendations, Keeping to nutrition structure/No spontaneous eating, Hourly movement, 150 minutes of exercise per week, 2 episodes of strength training per week, 7 or more hours of sleep per night, 10 minutes of dedicated relaxation daily, Medication compliance          Thank you very much for allowing us to participate in the care of your patient. " Please do not hesitate to call or email if there are any questions.

## 2023-06-28 ENCOUNTER — OFFICE VISIT (OUTPATIENT)
Dept: BARIATRICS/WEIGHT MGMT | Facility: CLINIC | Age: 53
End: 2023-06-28
Payer: COMMERCIAL

## 2023-06-28 VITALS
SYSTOLIC BLOOD PRESSURE: 110 MMHG | BODY MASS INDEX: 23.03 KG/M2 | HEIGHT: 64 IN | WEIGHT: 134.9 LBS | DIASTOLIC BLOOD PRESSURE: 68 MMHG | HEART RATE: 71 BPM | RESPIRATION RATE: 18 BRPM | OXYGEN SATURATION: 99 %

## 2023-06-28 DIAGNOSIS — R73.01 IMPAIRED FASTING GLUCOSE: Primary | ICD-10-CM

## 2023-06-28 PROCEDURE — 99214 OFFICE O/P EST MOD 30 MIN: CPT | Performed by: NURSE PRACTITIONER

## 2023-06-28 PROCEDURE — 3008F BODY MASS INDEX DOCD: CPT | Performed by: NURSE PRACTITIONER

## 2023-06-28 NOTE — PATIENT INSTRUCTIONS
Progress: Weight change:stable        NSV:exercise, nutrition    Currently meeting the following goals:12 hour fast daily, Drinking 64 oz or more of water daily, Eating recommended amount of protein , Meeting vegetable fiber intake recommendations, Keeping to nutrition structure/No spontaneous eating, Hourly movement, 150 minutes of exercise per week, 2 episodes of strength training per week, 7 or more hours of sleep per night, 10 minutes of dedicated relaxation daily, Medication compliance    Medication recommendations: Continue Mounjaro 2.4 mg weekly as this is controlling hunger.     Other: please check A1c.

## 2023-06-28 NOTE — ASSESSMENT & PLAN NOTE
12 hour fast daily, Drinking 64 oz or more of water daily, Eating recommended amount of protein , Meeting vegetable fiber intake recommendations, Keeping to nutrition structure/No spontaneous eating, Hourly movement, 150 minutes of exercise per week, 2 episodes of strength training per week, 7 or more hours of sleep per night, 10 minutes of dedicated relaxation daily, Medication compliance

## 2023-06-29 ENCOUNTER — HOSPITAL ENCOUNTER (OUTPATIENT)
Dept: RADIOLOGY | Facility: HOSPITAL | Age: 53
Discharge: HOME | End: 2023-06-29
Attending: INTERNAL MEDICINE
Payer: COMMERCIAL

## 2023-06-29 ENCOUNTER — TELEPHONE (OUTPATIENT)
Dept: PRIMARY CARE | Facility: CLINIC | Age: 53
End: 2023-06-29
Payer: COMMERCIAL

## 2023-06-29 DIAGNOSIS — Z80.3 FAMILY HISTORY OF MALIGNANT NEOPLASM OF BREAST: ICD-10-CM

## 2023-06-29 DIAGNOSIS — R92.8 ABNORMAL MAMMOGRAM: ICD-10-CM

## 2023-06-29 PROCEDURE — 77066 DX MAMMO INCL CAD BI: CPT

## 2023-06-29 NOTE — TELEPHONE ENCOUNTER
----- Message from Shady Otero MD sent at 6/29/2023  4:14 PM EDT -----  Kiet ORO let pt know mammo ok      [facility]iet [facility]  [place]  D

## 2023-06-29 NOTE — TELEPHONE ENCOUNTER
"Spoke with patient advising of Dr. Otero's message, \"Pls let pt know mammo ok.\"  Patient verbalized understanding.     "

## 2023-07-05 ENCOUNTER — OFFICE VISIT (OUTPATIENT)
Dept: PRIMARY CARE | Facility: CLINIC | Age: 53
End: 2023-07-05
Payer: COMMERCIAL

## 2023-07-05 VITALS
HEIGHT: 64 IN | BODY MASS INDEX: 22.88 KG/M2 | TEMPERATURE: 98.1 F | WEIGHT: 134 LBS | SYSTOLIC BLOOD PRESSURE: 106 MMHG | DIASTOLIC BLOOD PRESSURE: 62 MMHG | HEART RATE: 83 BPM | OXYGEN SATURATION: 98 %

## 2023-07-05 DIAGNOSIS — J02.0 STREPTOCOCCAL SORE THROAT: ICD-10-CM

## 2023-07-05 PROCEDURE — 3008F BODY MASS INDEX DOCD: CPT | Performed by: INTERNAL MEDICINE

## 2023-07-05 PROCEDURE — 99213 OFFICE O/P EST LOW 20 MIN: CPT | Performed by: INTERNAL MEDICINE

## 2023-07-05 RX ORDER — AMOXICILLIN 500 MG/1
500 TABLET, FILM COATED ORAL 3 TIMES DAILY
Qty: 21 TABLET | Refills: 0 | Status: SHIPPED | OUTPATIENT
Start: 2023-07-05 | End: 2023-07-12

## 2023-07-05 ASSESSMENT — ENCOUNTER SYMPTOMS
TROUBLE SWALLOWING: 1
COUGH: 0
ARTHRALGIAS: 0
SHORTNESS OF BREATH: 0
EYE PAIN: 0
LIGHT-HEADEDNESS: 0
SINUS PRESSURE: 0
RHINORRHEA: 0
DYSURIA: 0
HEMATURIA: 0
FATIGUE: 0
TREMORS: 0
ABDOMINAL PAIN: 0
APPETITE CHANGE: 0
NERVOUS/ANXIOUS: 0
FEVER: 0
DIZZINESS: 0
BLOOD IN STOOL: 0
ENDOCRINE NEGATIVE: 1
DIARRHEA: 0
SORE THROAT: 1
CONSTIPATION: 0
HEADACHES: 0

## 2023-07-05 NOTE — PATIENT INSTRUCTIONS
Problem List Items Addressed This Visit          Infectious/Inflammatory    Streptococcal sore throat     Salt water gargle    Chloraseptic throat spray    Tylenol or ibuprofen as needed for pain/fever         Relevant Medications    amoxicillin (AMOXIL) 500 mg tablet

## 2023-08-16 ENCOUNTER — TELEPHONE (OUTPATIENT)
Dept: BARIATRICS/WEIGHT MGMT | Facility: CLINIC | Age: 53
End: 2023-08-16
Payer: COMMERCIAL

## 2023-08-28 NOTE — PROGRESS NOTES
"  Subjective  office visit     Patient ID: Yany Mahoney is a 52 y.o. female.  1970      Yany Mahoney presents for an office visit regarding a sore throat that has persisted for since 6/28 with no OTC relief. Pt reports that the pain is primarily on the left side, and it began after having dental work on 6/27. Pt reports that it has been painful to swallow, and she sometimes has trouble swallowing as well. Pt denies fever, chills, night sweats, joint pain.       The following have been reviewed and updated as appropriate in this visit:   Tobacco  Allergies  Meds  Problems  Med Hx  Surg Hx  Fam Hx  Soc   Hx      Review of Systems   Constitutional: Negative for appetite change, fatigue and fever.   HENT: Positive for sore throat and trouble swallowing. Negative for congestion, ear pain, hearing loss, rhinorrhea and sinus pressure.         Pt reports sore throat and trouble swallowing for the last 7 days   Eyes: Negative for pain and visual disturbance.   Respiratory: Negative for cough and shortness of breath.    Cardiovascular: Negative for chest pain.   Gastrointestinal: Negative for abdominal pain, blood in stool, constipation and diarrhea.   Endocrine: Negative.  Negative for polyuria.   Genitourinary: Negative for dysuria and hematuria.   Musculoskeletal: Negative for arthralgias.   Skin: Negative.  Negative for rash.   Neurological: Negative for dizziness, tremors, light-headedness and headaches.   Psychiatric/Behavioral: The patient is not nervous/anxious.        Objective     Vitals:    07/05/23 1557   BP: 106/62   BP Location: Left upper arm   Patient Position: Sitting   Pulse: 83   Temp: 36.7 °C (98.1 °F)   TempSrc: Temporal   SpO2: 98%   Weight: 60.8 kg (134 lb)   Height: 1.626 m (5' 4\")     Body mass index is 23 kg/m².    Physical Exam  Constitutional:       Appearance: Normal appearance.   HENT:      Head: Normocephalic.      Right Ear: Tympanic membrane normal.      Left Ear: " Tympanic membrane normal.      Nose: Nose normal.      Mouth/Throat:      Pharynx: Posterior oropharyngeal erythema present.   Eyes:      Pupils: Pupils are equal, round, and reactive to light.   Cardiovascular:      Rate and Rhythm: Normal rate and regular rhythm.      Heart sounds: Normal heart sounds.   Pulmonary:      Effort: Pulmonary effort is normal.      Breath sounds: Normal breath sounds.   Abdominal:      General: Bowel sounds are normal.      Palpations: Abdomen is soft.   Musculoskeletal:         General: Normal range of motion.      Cervical back: Normal range of motion.   Lymphadenopathy:      Head:      Left side of head: Submandibular adenopathy present.      Cervical: Cervical adenopathy present.   Skin:     General: Skin is warm.   Neurological:      Mental Status: She is alert. Mental status is at baseline.   Psychiatric:         Mood and Affect: Mood normal.         Behavior: Behavior normal.         Judgment: Judgment normal.         Assessment/Plan   Diagnoses and all orders for this visit:    Streptococcal sore throat  Assessment & Plan:  Salt water gargle    Chloraseptic throat spray    Tylenol or ibuprofen as needed for pain/fever    Orders:  -     amoxicillin (AMOXIL) 500 mg tablet; Take 1 tablet (500 mg total) by mouth 3 (three) times a day for 7 days.      By signing my name below, I, Stefany Garcia, attest that this documentation has been prepared under the direction and in the presence of Shady Otero MD      7/5/2023 4:14 PM    I spent 25 minutes on this date of service performing the following activities: obtaining history, performing examination, entering orders, documenting, preparing for visit, obtaining / reviewing records, providing counseling and education, independently reviewing study/studies and communicating results.   59F w/PMH HLD, FM, chronic pain, presents c/o sudden onset of nausea and epigastric abd pain 3 days ago.   In ED, labs wnl, lipase neg, trop neg, CT head neg (for initial dizziness, although she's no longer dizzy),  given meclizine, IV tylenol, ivf.  CT a/p (+con) Central intrahepatic biliary and common bile ductal dilatation to the level of the ampulla. Pancreatic ductal dilatation.  Small density at the proximal to the lumen above the level of the ampulla, which may be due to ingested content versus focal lesion. Recommend correlation and follow-up (MRCP/MR and direct visualization) to exclude underlying biliary/ampullary pathology.     US RUQ: Intrahepatic and extrahepatic biliary ductal dilatation with the common bile duct measuring up to 13 mm as well as mild dilatation of the main pancreatic duct and gallbladder distention. Findings are concerning for obstruction at the level of the ampulla, recommend further evaluation with MRCP.    #Epigastic pain  #Nausea  #Biliary duct dilatation  #ampulla lesion, possibly  - Check MRCP  - GI cs   - PPI IV 40mg BID  - ivf  - diet per GI if no scoping planned    #elevated blood pressure, no h/o HTN  - suspect d/t pain  - monitor, treat as indicated    #Chronic pain  - c/w duragesic patch 100mcg Q48H    #PPX- ambulate 59F w/PMH HLD, FM, chronic pain, presents c/o sudden onset of nausea and epigastric abd pain 3 days ago.   In ED, labs wnl, lipase neg, trop neg, CT head neg (for initial dizziness, although she's no longer dizzy),  given meclizine, IV tylenol, ivf.  CT a/p (+con) Central intrahepatic biliary and common bile ductal dilatation to the level of the ampulla. Pancreatic ductal dilatation.  Small density at the proximal to the lumen above the level of the ampulla, which may be due to ingested content versus focal lesion. Recommend correlation and follow-up (MRCP/MR and direct visualization) to exclude underlying biliary/ampullary pathology.     US RUQ: Intrahepatic and extrahepatic biliary ductal dilatation with the common bile duct measuring up to 13 mm as well as mild dilatation of the main pancreatic duct and gallbladder distention. Findings are concerning for obstruction at the level of the ampulla, recommend further evaluation with MRCP.    #Epigastic pain  #Nausea  #Biliary duct dilatation  #ampulla lesion, possibly  - MRCP revealed biliary dilatation  - GI cs   - PPI IV 40mg BID  - ivf  - discharge home on soft, bland diet and to have EGD / Colonoscopy on 8/31    #elevated blood pressure, no h/o HTN  - suspect d/t pain  - monitor, treat as indicated    #Chronic pain  - c/w duragesic patch 100mcg Q48H    #PPX- ambulate

## 2023-09-08 ENCOUNTER — APPOINTMENT (OUTPATIENT)
Dept: URBAN - METROPOLITAN AREA CLINIC 203 | Age: 53
Setting detail: DERMATOLOGY
End: 2023-09-15

## 2023-09-08 DIAGNOSIS — L57.8 OTHER SKIN CHANGES DUE TO CHRONIC EXPOSURE TO NONIONIZING RADIATION: ICD-10-CM

## 2023-09-08 DIAGNOSIS — D485 NEOPLASM OF UNCERTAIN BEHAVIOR OF SKIN: ICD-10-CM

## 2023-09-08 DIAGNOSIS — Z86.006 PERSONAL HISTORY OF MELANOMA IN-SITU: ICD-10-CM

## 2023-09-08 PROBLEM — D48.5 NEOPLASM OF UNCERTAIN BEHAVIOR OF SKIN: Status: ACTIVE | Noted: 2023-09-08

## 2023-09-08 PROCEDURE — OTHER SUNSCREEN RECOMMENDATIONS: OTHER

## 2023-09-08 PROCEDURE — OTHER COUNSELING: OTHER

## 2023-09-08 PROCEDURE — 99213 OFFICE O/P EST LOW 20 MIN: CPT

## 2023-09-08 PROCEDURE — OTHER PHOTO-DOCUMENTATION: OTHER

## 2023-09-08 PROCEDURE — OTHER DERMTECH: PLA - PIGMENTED LESION ASSAY: OTHER

## 2023-09-08 ASSESSMENT — LOCATION DETAILED DESCRIPTION DERM
LOCATION DETAILED: LEFT RIB CAGE
LOCATION DETAILED: LEFT MEDIAL BREAST 10-11:00 REGION
LOCATION DETAILED: LEFT MEDIAL BREAST 11-12:00 REGION
LOCATION DETAILED: RIGHT SUPERIOR UPPER BACK

## 2023-09-08 ASSESSMENT — LOCATION SIMPLE DESCRIPTION DERM
LOCATION SIMPLE: LEFT BREAST
LOCATION SIMPLE: RIGHT UPPER BACK
LOCATION SIMPLE: ABDOMEN

## 2023-09-08 ASSESSMENT — LOCATION ZONE DERM: LOCATION ZONE: TRUNK

## 2023-09-08 NOTE — PROCEDURE: [facility]: PLA - PIGMENTED LESION ASSAY
Pre Procedure Details (Documentation Required By [facility]: The lesion was visually assessed and meets one or more clinical signs suggestive of melanoma (ABCDE). I have the requisite knowledge, skills and experience to evaluate and manage pigmented lesions. The lesion submitted is for a patient having skin type Escobar I, II or III. Patient was counseled that Lesions with positive results will be considered for biopsy. Lesions with negative results will be clinically followed and monitored for changes.

## 2023-09-19 ENCOUNTER — TELEPHONE (OUTPATIENT)
Dept: BARIATRICS/WEIGHT MGMT | Facility: CLINIC | Age: 53
End: 2023-09-19
Payer: COMMERCIAL

## 2023-09-19 NOTE — TELEPHONE ENCOUNTER
Patient called in asking about Wegovy. She is due to do her injection today. Her pharmacy does not have it at this time. They believe they might get a shipment in today but she wanted to ask if she should do a higher dose prescription to see if her pharmacy will have it. Patient asked to speak to [facility].

## 2023-11-13 ENCOUNTER — TELEPHONE (OUTPATIENT)
Dept: BARIATRICS/WEIGHT MGMT | Facility: CLINIC | Age: 53
End: 2023-11-13
Payer: COMMERCIAL

## 2023-12-18 ENCOUNTER — TRANSCRIBE ORDERS (OUTPATIENT)
Dept: SCHEDULING | Age: 53
End: 2023-12-18

## 2023-12-18 DIAGNOSIS — R92.8 OTHER ABNORMAL AND INCONCLUSIVE FINDINGS ON DIAGNOSTIC IMAGING OF BREAST: Primary | ICD-10-CM

## 2024-03-19 ENCOUNTER — APPOINTMENT (OUTPATIENT)
Dept: URBAN - METROPOLITAN AREA CLINIC 203 | Age: 54
Setting detail: DERMATOLOGY
End: 2024-03-19

## 2024-03-19 DIAGNOSIS — L57.8 OTHER SKIN CHANGES DUE TO CHRONIC EXPOSURE TO NONIONIZING RADIATION: ICD-10-CM

## 2024-03-19 DIAGNOSIS — L81.4 OTHER MELANIN HYPERPIGMENTATION: ICD-10-CM

## 2024-03-19 DIAGNOSIS — Z85.820 PERSONAL HISTORY OF MALIGNANT MELANOMA OF SKIN: ICD-10-CM

## 2024-03-19 PROCEDURE — OTHER REASSURANCE: OTHER

## 2024-03-19 PROCEDURE — OTHER COUNSELING: OTHER

## 2024-03-19 PROCEDURE — OTHER SUNSCREEN RECOMMENDATIONS: OTHER

## 2024-03-19 PROCEDURE — 99213 OFFICE O/P EST LOW 20 MIN: CPT

## 2024-03-19 ASSESSMENT — LOCATION DETAILED DESCRIPTION DERM
LOCATION DETAILED: RIGHT SUPERIOR UPPER BACK
LOCATION DETAILED: LEFT MEDIAL BREAST 10-11:00 REGION
LOCATION DETAILED: LEFT MEDIAL BREAST 11-12:00 REGION
LOCATION DETAILED: RIGHT LATERAL ABDOMEN

## 2024-03-19 ASSESSMENT — LOCATION SIMPLE DESCRIPTION DERM
LOCATION SIMPLE: ABDOMEN
LOCATION SIMPLE: LEFT BREAST
LOCATION SIMPLE: RIGHT UPPER BACK

## 2024-03-19 ASSESSMENT — LOCATION ZONE DERM: LOCATION ZONE: TRUNK

## 2024-03-21 ENCOUNTER — TELEPHONE (OUTPATIENT)
Dept: BARIATRICS/WEIGHT MGMT | Facility: CLINIC | Age: 54
End: 2024-03-21
Payer: COMMERCIAL

## 2024-03-21 NOTE — TELEPHONE ENCOUNTER
[facility] called regarding a PA for Wegovy 2.4,   Pharmacy was called alerting a PA has been done and expires 11/10/2024.

## 2024-03-26 DIAGNOSIS — E55.9 VITAMIN D DEFICIENCY: Primary | ICD-10-CM

## 2024-04-08 ENCOUNTER — TELEPHONE (OUTPATIENT)
Dept: BARIATRICS/WEIGHT MGMT | Facility: CLINIC | Age: 54
End: 2024-04-08
Payer: COMMERCIAL

## 2024-04-09 NOTE — PROGRESS NOTES
HISTORY OF PRESENT ILLNESS      Yany Mahoney  is a 53 y.o. y.o. female following up on lifestyle management and weight loss as adjunctive treatment strategies for  has a past medical history of Anxiety, Colon polyp (09/2019), Diabetes (CMS/HCC), Elevated C-reactive protein (CRP), Fibrocystic breast, Kidney stone (2000), Lyme disease (6/23/2020), Melanoma (CMS/HCC) (2018), Screening for breast cancer, Screening for colon cancer, and Seasonal allergies..    Medications reviewed today.     Body Composition Trend  Date Weight (Pounds) Body Fat %  Waist (Inches)   03/07/22 188.4 38.6 36   06/09/22 173.7 37 34   08/19/22 157.2 TELE TELE   10/07/22 152.8 31.6 30   12/22/22 144.6 TELE TELE   02/20/23 137 TELE TELE   03/31/23 - RD Visit 133.4 27.1     04/27/23 131 TELE TELE      Date Weight (Pounds) Body Fat %  Waist (Inches)   6/28/23  134.9 26.6/REE 1188 32.5     4/10/24  147.7 31.7/REE 1236  33.5         Medication:   Wegovy 2.4 mg. None since 3/10/24-insurance changed.  She has been on Wegovy since 3/7/22 to 3/10/23 (stopped due to change of insurance) and has lost a total of 41 lbs which is 22% of body weight.    Taking medications as prescribed:no  Side effects: none  Medication effect:  currently off med with increase hunger and weight regain  Supplements used to support lifestyle modification:Vitamin D, Omega 3, CoQ10 and Multivitamin      Medications:    Avoid topamax and Qsymia with kidney stones.     Interval History:  3/31/23: VICENTE Oconnell  1. Continue [facility] plan  2. Trial of magnesium glycinate 200-400 mg/night for sleep    Concerns or questions:   4/10/24: no new labs.  She got veins injected yesterday.  Off Wegovy since 3/1024 as insurance changed.  She gave us new insurance cards.  I will write an appeal letter if she can't continue this as she has had a significant benefit on Wegovy and is having weight regain.     Nutrition update:    Eating 3 meals, Protein with meals.  Hydrating well     Physical  "activity update:   Four days / week and added less cardio and more weight training.  Feels better when she does cardio.  Cardio 45-60 minutes, weight training 1.20 hours each one 3-4 times/week      Emotional Wellness update:   Mood stable.  Concern of weight regain one month off Wegovy.     Sleep update:   Sleep is restful 6.5 to 7 hours/ night       Current Outpatient Medications on File Prior to Visit   Medication Sig Dispense Refill    ascorbic acid (VITAMIN C ORAL) Take by mouth.      biotin 10 mg tablet Take 10 mg by mouth daily.        cholecalciferol, vitamin D3, 1,000 unit tablet Take by mouth.      coenzyme Q10 (COQ10) 10 mg capsule Take 10 mg by mouth daily.        green tea leaf extract (GREEN TEA ORAL) daily.      Lactobacillus acidophilus (PROBIOTIC ORAL) daily.      magnesium oxide (MAG-OX) 400 mg (241.3 mg magnesium) tablet Take 400 mg by mouth daily.      multivitamin (THERAGRAN) tablet Take by mouth.      omega-3 fatty acids-fish oil 300-1,000 mg capsule Take by mouth.      ZINC-15 66 mg tablet Take by mouth.       No current facility-administered medications on file prior to visit.          Bee sting [bee venom protein (honey bee)]           Lab Results   Component Value Date    HGBA1C 5.2 03/17/2023    HGBA1C 5.4 09/23/2020    HGBA1C 5.2 09/19/2019     Lab Results   Component Value Date    CHOL 178 03/17/2023    CHOL 206 (H) 09/23/2020    CHOL 196 09/19/2019     Lab Results   Component Value Date    HDL 72 03/17/2023    HDL 99 09/23/2020     09/19/2019     Lab Results   Component Value Date    LDLCALC 95 03/17/2023    LDLCALC 98 09/23/2020    LDLCALC 84 09/19/2019     Lab Results   Component Value Date    TRIG 56 03/17/2023    TRIG 51 09/23/2020    TRIG 47 09/19/2019     No results found for: \"CHOLHDL\"  Lab Results   Component Value Date     03/25/2024    K 4.4 03/25/2024     03/25/2024    CO2 24 03/25/2024    BUN 15 03/25/2024    CREATININE 0.73 03/25/2024    GLUCOSE 93 " "03/25/2024    AST 15 03/17/2023    ALT 9 03/17/2023    ALKPHOS 72 03/17/2023    PROTEIN 6.8 03/17/2023    ALBUMIN 4.1 03/17/2023    BILITOT 0.6 03/17/2023    EGFR 97 03/25/2024    ANIONGAP 6 08/07/2015       PHYSICAL EXAMINATION      Visit Vitals  /80 (BP Location: Right upper arm, Patient Position: Sitting)   Pulse 100   Resp 18   Ht 1.626 m (5' 4\")   Wt 67 kg (147 lb 11.2 oz)   LMP 05/15/2022   SpO2 97%   BMI 25.35 kg/m²         Physical Exam  NAD, excess weight noted  Skin intact  Respirations even and non-labored  MS FROM all extremities  Neuro A&O X 3  Psych pleasant and conversant           ASSESSMENT AND PLAN         Impaired fasting glucose  Maintains a low glycemic index nutrition plan and routine exercise.      BMI 22.0-22.9, adult  Length of time in Comprehensive weight management program: 2 years    Non-Scale Benefits: improved energy, mindful eating, lost inches, improved blood sugar, stronger, improved mobility/endurance, and body acceptance    Weight change:gain 13 lbs (no Wegovy for one month)    Currently meeting the following goals:12 hour fast daily, Drinking 64 oz or more of water daily, Eating recommended amount of protein , Meeting vegetable/fiber intake recommendations, No Naked Carbs, Keeping to planned nutrition, Hourly movement, Tracking Activity, 150 minutes of exercise per week, 10 minutes of dedicated relaxation daily, Medication compliance    Continue above sustainable habits and advance plan to include    Nutrition Goal: Aim for 100 grams of protein daily and Increase Vegetable intake to >2 cups per day    Activity Goal: Resistance train 2-3  days a week    Wellness Goal:  Meeting sleep and mindfulness goals, continue current plan    Medication recommendations:   Start Metformin  mg daily  Resume Wegovy at 1.0 mg weekly    Other: Reviewed recent labs        I spent 40 minutes on this date of service performing the following activities: obtaining history, entering orders, " documenting, preparing for visit, obtaining / reviewing records, providing counseling and education, independently reviewing study/studies, and coordinating care.     KEMI Miller    Thank you very much for allowing us to participate in the care of your patient. Please do not hesitate to call or email if there are any questions.      Thank you very much for allowing us to participate in the care of your patient. Please do not hesitate to call or email if there are any questions.

## 2024-04-10 ENCOUNTER — OFFICE VISIT (OUTPATIENT)
Dept: BARIATRICS/WEIGHT MGMT | Facility: CLINIC | Age: 54
End: 2024-04-10
Payer: COMMERCIAL

## 2024-04-10 VITALS
DIASTOLIC BLOOD PRESSURE: 80 MMHG | HEART RATE: 100 BPM | SYSTOLIC BLOOD PRESSURE: 108 MMHG | RESPIRATION RATE: 18 BRPM | BODY MASS INDEX: 25.22 KG/M2 | HEIGHT: 64 IN | OXYGEN SATURATION: 97 % | WEIGHT: 147.7 LBS

## 2024-04-10 DIAGNOSIS — R73.01 IMPAIRED FASTING GLUCOSE: Primary | ICD-10-CM

## 2024-04-10 PROCEDURE — 99215 OFFICE O/P EST HI 40 MIN: CPT | Performed by: NURSE PRACTITIONER

## 2024-04-10 PROCEDURE — 3008F BODY MASS INDEX DOCD: CPT | Performed by: NURSE PRACTITIONER

## 2024-04-10 RX ORDER — METFORMIN HYDROCHLORIDE 500 MG/1
TABLET, EXTENDED RELEASE ORAL
Qty: 90 TABLET | Refills: 1 | Status: SHIPPED | OUTPATIENT
Start: 2024-04-10 | End: 2024-06-25 | Stop reason: ALTCHOICE

## 2024-04-10 RX ORDER — FLASH GLUCOSE SENSOR
1 KIT MISCELLANEOUS DAILY
Qty: 1 KIT | Refills: 0 | Status: SHIPPED | OUTPATIENT
Start: 2024-04-10 | End: 2024-04-10

## 2024-04-10 RX ORDER — SEMAGLUTIDE 1 MG/.5ML
1 INJECTION, SOLUTION SUBCUTANEOUS
Qty: 2 ML | Refills: 0 | Status: SHIPPED | OUTPATIENT
Start: 2024-04-10 | End: 2024-11-04

## 2024-04-10 RX ORDER — METFORMIN HYDROCHLORIDE 500 MG/1
TABLET, EXTENDED RELEASE ORAL
Qty: 90 TABLET | Refills: 1 | Status: SHIPPED | OUTPATIENT
Start: 2024-04-10 | End: 2024-04-10

## 2024-04-10 RX ORDER — BLOOD-GLUCOSE SENSOR
1 EACH MISCELLANEOUS
Qty: 1 EACH | Refills: 0 | Status: SHIPPED | OUTPATIENT
Start: 2024-04-10 | End: 2025-03-27 | Stop reason: ALTCHOICE

## 2024-04-10 NOTE — ASSESSMENT & PLAN NOTE
Length of time in Comprehensive weight management program: 2 years    Non-Scale Benefits: improved energy, mindful eating, lost inches, improved blood sugar, stronger, improved mobility/endurance, and body acceptance    Weight change:gain 13 lbs (no Wegovy for one month)    Currently meeting the following goals:12 hour fast daily, Drinking 64 oz or more of water daily, Eating recommended amount of protein , Meeting vegetable/fiber intake recommendations, No Naked Carbs, Keeping to planned nutrition, Hourly movement, Tracking Activity, 150 minutes of exercise per week, 10 minutes of dedicated relaxation daily, Medication compliance    Continue above sustainable habits and advance plan to include    Nutrition Goal: Aim for 100 grams of protein daily and Increase Vegetable intake to >2 cups per day    Activity Goal: Resistance train 2-3  days a week    Wellness Goal:  Meeting sleep and mindfulness goals, continue current plan    Medication recommendations:   Start Metformin  mg daily  Resume Wegovy at 1.0 mg weekly    Other: Reviewed recent labs

## 2024-04-10 NOTE — PATIENT INSTRUCTIONS
"Start using Freestyle Jody 2 continuous glucose monitor - if your insurance does not cover this, ask the pharmacy if they can use a manufacturers coupon code to bring the price down.   When you monitor your glucose, you want to pay attention to your highs and lows and your peaks in regards to eating.  -Morning fasting sugars are ideally between 70-90  -Post meal sugars are ideally less than 120, but if it is higher, do not freak out, just adjust   -Avoid having more than a 30 point increase from baseline to peak with any meal  The above recommendations are the absolute tightest glucose control one should have   Aim to spend a week collecting data and observe, then use your second week to make adjustments       Metformin   Metformin is not a \"weight loss\" medication, but helps to manage the most common risk factor for weight gain --- \"insulin resistance.\"  When we are able to decrease the amount of insulin in our body, we can reverse the \"store fat\" command that insulin delivers.   Start with 1 tab daily for at least 2 weeks with your largest meal of the day.  After 2 weeks if you are tolerating this dose without side effects, add in a second dose with 1 tab with another meal (for example, if you take your first dose at breakfast, you can take your second at dinner or vice versa). If you develop stomach upset with the twice daily dosing for more than 1 week, please return to the lower dose. You do not have to take Metformin with food for it to be effective, but typically taking it with food minimizes side effects.   Side Effects: Most common side effects are upset stomach or loose bowels (this happens to approximately 10% of patients).  If this side effect occurs, it will generally get better in 1 to 2 weeks, so if it is mild and tolerable, please continue the medication.  If you develop severe stomach upset or diarrhea, it is okay to discontinue the medication.  In the event of a \"GI\" bug or if you need contrast " dye for any reason, you will need to stop Metformin for 3-4 days. When you restart, increase your dose slowly over the course of one week.    Metformin does not produce hypoglycemia (low blood sugar levels) because it does not change how much insulin is secreted by the pancreas and does not cause high insulin levels. Low blood sugars may happen if paired with low blood sugar producing medications.

## 2024-04-12 ENCOUNTER — TELEPHONE (OUTPATIENT)
Dept: BARIATRICS/WEIGHT MGMT | Facility: CLINIC | Age: 54
End: 2024-04-12
Payer: COMMERCIAL

## 2024-04-15 PROBLEM — E11.9 DIABETES (CMS/HCC): Status: ACTIVE | Noted: 2024-04-15

## 2024-04-16 ENCOUNTER — TELEPHONE (OUTPATIENT)
Dept: BARIATRICS/WEIGHT MGMT | Facility: CLINIC | Age: 54
End: 2024-04-16
Payer: COMMERCIAL

## 2024-04-19 ENCOUNTER — TELEPHONE (OUTPATIENT)
Dept: BARIATRICS/WEIGHT MGMT | Facility: CLINIC | Age: 54
End: 2024-04-19
Payer: COMMERCIAL

## 2024-04-19 NOTE — TELEPHONE ENCOUNTER
Patient called regarding medication, states that Rx cost more (1003.00) than originally (25) paid previously. New insurance has been  updated and PA resolved on 4/16.

## 2024-05-14 ENCOUNTER — TELEPHONE (OUTPATIENT)
Dept: BARIATRICS/WEIGHT MGMT | Facility: CLINIC | Age: 54
End: 2024-05-14

## 2024-05-31 ENCOUNTER — HOSPITAL ENCOUNTER (OUTPATIENT)
Dept: RADIOLOGY | Facility: HOSPITAL | Age: 54
Discharge: HOME | End: 2024-05-31
Attending: OBSTETRICS & GYNECOLOGY
Payer: COMMERCIAL

## 2024-05-31 DIAGNOSIS — R92.8 OTHER ABNORMAL AND INCONCLUSIVE FINDINGS ON DIAGNOSTIC IMAGING OF BREAST: ICD-10-CM

## 2024-05-31 RX ORDER — GADOBUTROL 604.72 MG/ML
6.6 INJECTION INTRAVENOUS ONCE
Status: ACTIVE | OUTPATIENT
Start: 2024-05-31 | End: 2024-05-31

## 2024-06-07 ENCOUNTER — HOSPITAL ENCOUNTER (OUTPATIENT)
Dept: RADIOLOGY | Facility: HOSPITAL | Age: 54
Discharge: HOME | End: 2024-06-07
Attending: OBSTETRICS & GYNECOLOGY
Payer: COMMERCIAL

## 2024-06-07 RX ORDER — GADOBUTROL 604.72 MG/ML
6.6 INJECTION INTRAVENOUS ONCE
Status: COMPLETED | OUTPATIENT
Start: 2024-06-07 | End: 2024-06-07

## 2024-06-07 RX ADMIN — GADOBUTROL 6.6 ML: 604.72 INJECTION INTRAVENOUS at 10:30

## 2024-06-25 ENCOUNTER — OFFICE VISIT (OUTPATIENT)
Dept: PRIMARY CARE | Facility: CLINIC | Age: 54
End: 2024-06-25
Payer: COMMERCIAL

## 2024-06-25 VITALS
HEART RATE: 69 BPM | HEIGHT: 63 IN | DIASTOLIC BLOOD PRESSURE: 60 MMHG | OXYGEN SATURATION: 98 % | WEIGHT: 150.6 LBS | TEMPERATURE: 98.2 F | BODY MASS INDEX: 26.68 KG/M2 | SYSTOLIC BLOOD PRESSURE: 110 MMHG

## 2024-06-25 DIAGNOSIS — Z85.828 H/O MALIGNANT NEOPLASM OF SKIN: ICD-10-CM

## 2024-06-25 DIAGNOSIS — Z80.3 FAMILY HISTORY OF MALIGNANT NEOPLASM OF BREAST: ICD-10-CM

## 2024-06-25 DIAGNOSIS — Z00.00 PREVENTATIVE HEALTH CARE: Primary | ICD-10-CM

## 2024-06-25 DIAGNOSIS — N92.6 IRREGULAR MENSES: ICD-10-CM

## 2024-06-25 DIAGNOSIS — K63.5 HYPERPLASTIC POLYP OF DESCENDING COLON: ICD-10-CM

## 2024-06-25 DIAGNOSIS — E55.9 VITAMIN D DEFICIENCY: ICD-10-CM

## 2024-06-25 DIAGNOSIS — R73.01 IMPAIRED FASTING GLUCOSE: ICD-10-CM

## 2024-06-25 DIAGNOSIS — Z86.32 HISTORY OF GESTATIONAL DIABETES: ICD-10-CM

## 2024-06-25 PROBLEM — E11.9 DIABETES (CMS/HCC): Status: RESOLVED | Noted: 2024-04-15 | Resolved: 2024-06-25

## 2024-06-25 PROBLEM — J02.0 STREPTOCOCCAL SORE THROAT: Status: RESOLVED | Noted: 2023-07-05 | Resolved: 2024-06-25

## 2024-06-25 PROCEDURE — 3008F BODY MASS INDEX DOCD: CPT | Performed by: INTERNAL MEDICINE

## 2024-06-25 PROCEDURE — 99396 PREV VISIT EST AGE 40-64: CPT | Performed by: INTERNAL MEDICINE

## 2024-06-25 RX ORDER — METFORMIN HYDROCHLORIDE 500 MG/1
TABLET, EXTENDED RELEASE ORAL
Start: 2024-06-25 | End: 2025-03-27 | Stop reason: ALTCHOICE

## 2024-06-25 ASSESSMENT — ENCOUNTER SYMPTOMS
FATIGUE: 0
COUGH: 0
WEAKNESS: 0
BLOOD IN STOOL: 0
BACK PAIN: 0
DYSURIA: 0
SINUS PRESSURE: 0
DIARRHEA: 0
ENDOCRINE NEGATIVE: 1
TROUBLE SWALLOWING: 0
RHINORRHEA: 0
LIGHT-HEADEDNESS: 0
APPETITE CHANGE: 0
TREMORS: 0
CONSTIPATION: 0
SHORTNESS OF BREATH: 0
HEMATURIA: 0
ABDOMINAL PAIN: 0
NERVOUS/ANXIOUS: 0
BRUISES/BLEEDS EASILY: 0
FEVER: 0
SORE THROAT: 0

## 2024-06-25 ASSESSMENT — PATIENT HEALTH QUESTIONNAIRE - PHQ9: SUM OF ALL RESPONSES TO PHQ9 QUESTIONS 1 & 2: 0

## 2024-06-25 NOTE — PROGRESS NOTES
"    Subjective Office visit, physical.     Patient ID: Yany Mahoney is a 53 y.o. female.  1970      The patient presents to the office today for a physical exam. She reports good overall health. She reports gaining about 15 pounds since beginning her new job.      Annual        The following have been reviewed and updated as appropriate in this visit:   Tobacco  Allergies  Meds  Problems  Med Hx  Surg Hx  Fam Hx  Soc   Hx      Review of Systems   Constitutional:  Negative for appetite change, fatigue and fever.   HENT:  Negative for congestion, ear pain, hearing loss, rhinorrhea, sinus pressure, sore throat and trouble swallowing.    Eyes:  Negative for visual disturbance.   Respiratory:  Negative for cough and shortness of breath.    Cardiovascular:  Negative for chest pain.   Gastrointestinal:  Negative for abdominal pain, blood in stool, constipation and diarrhea.   Endocrine: Negative.  Negative for polyuria.   Genitourinary:  Negative for dysuria and hematuria.   Musculoskeletal:  Negative for back pain.   Skin: Negative.  Negative for rash.   Allergic/Immunologic: Negative for food allergies.   Neurological:  Negative for tremors, weakness and light-headedness.   Hematological:  Does not bruise/bleed easily.   Psychiatric/Behavioral:  The patient is not nervous/anxious.        Objective     Vitals:    06/25/24 0800 06/25/24 0820   BP: 130/74 110/60   BP Location: Left upper arm Left upper arm   Patient Position: Sitting Sitting   Pulse: 69    Temp: 36.8 °C (98.2 °F)    TempSrc: Temporal    SpO2: 98%    Weight: 68.3 kg (150 lb 9.6 oz)    Height: 1.607 m (5' 3.25\")      Body mass index is 26.47 kg/m².    Physical Exam  Constitutional:       Appearance: Normal appearance.   HENT:      Head: Normocephalic.      Right Ear: Tympanic membrane normal.      Left Ear: Tympanic membrane normal.      Nose: Nose normal.   Eyes:      Pupils: Pupils are equal, round, and reactive to light. " "  Cardiovascular:      Rate and Rhythm: Normal rate and regular rhythm.      Heart sounds: Normal heart sounds.   Pulmonary:      Effort: Pulmonary effort is normal.      Breath sounds: Normal breath sounds.   Abdominal:      Palpations: Abdomen is soft.   Musculoskeletal:      Cervical back: Normal range of motion.      Right lower leg: No edema.      Left lower leg: No edema.   Skin:     General: Skin is warm.   Neurological:      Mental Status: She is alert. Mental status is at baseline.   Psychiatric:         Mood and Affect: Mood normal.         Behavior: Behavior normal.         Judgment: Judgment normal.         Assessment/Plan   Diagnoses and all orders for this visit:    Preventative health care (Primary)  Assessment & Plan:  Exercise 30 minutes EVERY DAY! This helps by adding 9 years to life expectancy.    Consider adding yoga or juan chi for balance to prevent falls and strengthen legs. Planking helps to strengthen core. Water aerobics are also a great alternative for exercise.    Protect hearing when engaging in loud activity like blowing the leaves or mowing the grass to prevent hearing loss. This helps to prevent dementia.    Drink 1/2 gallon or more of water each day     Dental cleaning every 6 months    Vision check every 2 -3 years. (Increase to yearly at age 60.)     Last colonoscopy 2019, due now      Covid booster/extension as recommended by CDC    Recommend Tdap vaccine every 10 years    Recommend Flu vaccine in Fall 2024    Recommend reading \" How Not To Diet\" by Dr. Don Burton.    Cutting your risk of Dementia    1.Controlling blood pressure.  2. Protect your hearing.  3.Limit alcohol use to 2 drinks a day (Less than 2 oz).  4. Avoid trauma to head.  5. Do not smoke.  6. Exercising at least 30 minutes daily.         Orders:  -     Lipid panel; Future  -     CBC and differential; Future  -     Comprehensive metabolic panel; Future  -     Hemoglobin A1c; Future  -     Vitamin D 25 hydroxy; " Future  -     Vitamin B12; Future  -     TSH w reflex FT4; Future  -     Lyme Abs Total W/Reflex WB; Future  -     FSH/LH; Future    Hyperplastic polyp of descending colon  Assessment & Plan:  Colonoscopy 9/2019    Dr Boles one polyp removed      Follow up 5 years    Orders:  -     Ambulatory referral to Gastroenterology; Future    Impaired fasting glucose  -     Hemoglobin A1c; Future    Vitamin D deficiency  -     Vitamin D 25 hydroxy; Future    History of gestational diabetes  -     Hemoglobin A1c; Future    Family history of malignant neoplasm of breast  -     BI SCREENING MAMMOGRAM BILATERAL(TOMOSYNTHESIS); Future    H/O malignant neoplasm of skin  Assessment & Plan:  See dermatologist every 6 - 12 months       Irregular menses  -     CBC and differential; Future  -     FSH/LH; Future        By signing my name below, I, Yany Benigno, attest that this documentation has been prepared under the direction and in the presence of Dr. Shady Otero MD.     I, Dr. Shady Otero MD, personally performed the services described in this documentation, as documented by the scribe in my presence, and it is both accurate and complete.

## 2024-06-25 NOTE — ASSESSMENT & PLAN NOTE
"Exercise 30 minutes EVERY DAY! This helps by adding 9 years to life expectancy.    Consider adding yoga or juan chi for balance to prevent falls and strengthen legs. Planking helps to strengthen core. Water aerobics are also a great alternative for exercise.    Protect hearing when engaging in loud activity like blowing the leaves or mowing the grass to prevent hearing loss. This helps to prevent dementia.    Drink 1/2 gallon or more of water each day     Dental cleaning every 6 months    Vision check every 2 -3 years. (Increase to yearly at age 60.)     Last colonoscopy 2019, due now      Covid booster/extension as recommended by CDC    Recommend Tdap vaccine every 10 years    Recommend Flu vaccine in Fall 2024    Recommend reading \" How Not To Diet\" by Dr. Don Burton.    Cutting your risk of Dementia    1.Controlling blood pressure.  2. Protect your hearing.  3.Limit alcohol use to 2 drinks a day (Less than 2 oz).  4. Avoid trauma to head.  5. Do not smoke.  6. Exercising at least 30 minutes daily.       "

## 2024-08-05 ENCOUNTER — TELEPHONE (OUTPATIENT)
Dept: PRIMARY CARE | Facility: CLINIC | Age: 54
End: 2024-08-05
Payer: COMMERCIAL

## 2024-08-05 NOTE — TELEPHONE ENCOUNTER
LVM fot pt Verified phi   Let pt know that [facility] has reached out to us to letting us know that they have been trying to attempt to contact the pt to schedule an appt and haven't been able to they can be reached at

## 2024-09-18 ENCOUNTER — TELEPHONE (OUTPATIENT)
Dept: PRIMARY CARE | Facility: CLINIC | Age: 54
End: 2024-09-18
Payer: COMMERCIAL

## 2024-09-18 ENCOUNTER — TRANSCRIBE ORDERS (OUTPATIENT)
Dept: SCHEDULING | Age: 54
End: 2024-09-18

## 2024-09-18 DIAGNOSIS — R92.8 ABNORMAL MAMMOGRAM: Primary | ICD-10-CM

## 2024-09-18 DIAGNOSIS — Z80.3 FAMILY HISTORY OF MALIGNANT NEOPLASM OF BREAST: ICD-10-CM

## 2024-09-18 NOTE — TELEPHONE ENCOUNTER
Intake letter mailed with  intake packet and Intermediate Unit to the mailing address on file  Message will be deferred for 4 weeks  Request for Medical Advice (NON-URGENT)   Patient PCP: Shady Otero MD  New or Existing Issue: New  Question or Concern: Needs new script for mammogram put in system. She has the wrong script, so she needs it to be resubmitted. Patient would like a call back from office asasp so she can explain. Her appt is tomorrow and she does not want to cancel      The practice will reach out to discuss your Medical Question or Concern within 2 business days.

## 2024-09-23 ENCOUNTER — HOSPITAL ENCOUNTER (OUTPATIENT)
Dept: RADIOLOGY | Facility: HOSPITAL | Age: 54
Discharge: HOME | End: 2024-09-23
Attending: INTERNAL MEDICINE
Payer: COMMERCIAL

## 2024-09-23 DIAGNOSIS — R92.8 ABNORMAL MAMMOGRAM: ICD-10-CM

## 2024-09-23 DIAGNOSIS — Z80.3 FAMILY HISTORY OF MALIGNANT NEOPLASM OF BREAST: ICD-10-CM

## 2024-09-23 PROCEDURE — 77066 DX MAMMO INCL CAD BI: CPT

## 2024-09-24 ENCOUNTER — TELEPHONE (OUTPATIENT)
Dept: PRIMARY CARE | Facility: CLINIC | Age: 54
End: 2024-09-24
Payer: COMMERCIAL

## 2024-09-24 NOTE — TELEPHONE ENCOUNTER
"Spoke with patient advising of Dr. Otero's message   \" let pt know ok  Repeat 1 year\"  Patient verbalized understanding     "

## 2024-10-30 NOTE — PROGRESS NOTES
HISTORY OF PRESENT ILLNESS      Yayn Mahoney  is a 54 y.o. y.o. female following up on lifestyle management and weight loss as adjunctive treatment strategies for  has a past medical history of Anxiety, Colon polyp (09/2019), Elevated C-reactive protein (CRP), Fibrocystic breast, Gestational diabetes, Kidney stone (2000), Lyme disease (06/23/2020), Melanoma (CMS/HCC) (2018), Screening for breast cancer, Screening for colon cancer, and Seasonal allergies. Elevated cholesterol.   Medications reviewed today.     Body Composition Trend  Date Weight (Pounds) Body Fat %  Waist (Inches)   03/07/22 188.4 38.6 36   06/09/22 173.7 37 34   08/19/22 157.2 TELE TELE   10/07/22 152.8 31.6 30   12/22/22 144.6 TELE TELE   02/20/23 137 TELE TELE   03/31/23 - RD Visit 133.4 27.1     04/27/23 131 TELE TELE      Date Weight (Pounds) Body Fat %  Waist (Inches)   6/28/23  134.9 26.6/REE 1188 32.5     4/10/24 147.7 31.7/REE 1236  33.5    11/5/24  164 lb 35.5/REE 1302 35/BMI 29.3        Medication:   Metformin  mg one tab with dinner  She had been on Wegovy since 3/7/22 to 3/10/24 (stopped due to change of insurance) and has lost a total of 41 lbs which is 22% of body weight.    Taking medications as prescribed: yes metformin only.  Side effects: none  Medication effect:  currently off med with increase hunger and weight regain  Supplements used to support lifestyle modification:Vitamin D, Omega 3, CoQ10 and Multivitamin      Medications:    Avoid topamax and Qsymia with kidney stones.   She had been on Wegovy since 3/7/22 to 3/10/24 (stopped due to change of insurance) and has lost a total of 41 lbs which is 22% of body weight.      Interval History:  3/31/23: VICENTE Oconnell      Concerns or questions:   04/10/24: no new labs.  She got veins injected yesterday.  Off Wegovy since 3/1024 as insurance changed.  She gave us new insurance cards.  I will write an appeal letter if she can't continue this as she has had a significant  benefit on Wegovy and is having weight regain.   11/05/24: She has been gaining weight since being off Wegovy, she would like to restart this Medication  PCP ordered labs but not obtained.    Nutrition update:    Eating 3 meals, Protein with meals.  Hydrating well     Physical activity update:   Four days / week and added less cardio and more weight training.  Feels better when she does cardio.  Cardio 45-60 minutes, weight training 1.20 hours each one 3-4 times/week      Emotional Wellness update:   Mood stable.  Concern of weight regain off Wegovy     Sleep update:   Sleep is restful 6.5 to 7 hours/ night       Current Outpatient Medications on File Prior to Visit   Medication Sig Dispense Refill    ascorbic acid (VITAMIN C ORAL) Take by mouth.      biotin 10 mg tablet Take 10 mg by mouth daily.        blood-glucose sensor (FREESTYLE EYAD 3 SENSOR) device device 1 Device daily and an additional dose as needed (check as needed.). 1 each 0    cholecalciferol, vitamin D3, 1,000 unit tablet Take by mouth.      coenzyme Q10 (COQ10) 10 mg capsule Take 10 mg by mouth daily.        green tea leaf extract (GREEN TEA ORAL) daily.      Lactobacillus acidophilus (PROBIOTIC ORAL) daily.      magnesium oxide (MAG-OX) 400 mg (241.3 mg magnesium) tablet Take 400 mg by mouth daily.      metFORMIN XR (GLUCOPHAGE-XR) 500 mg 24 hr tablet One tab daily with dinner.      multivitamin (THERAGRAN) tablet Take by mouth.      omega-3 fatty acids-fish oil 300-1,000 mg capsule Take by mouth.      ZINC-15 66 mg tablet Take by mouth.       No current facility-administered medications on file prior to visit.          Bee sting [bee venom protein (honey bee)]           Lab Results   Component Value Date    HGBA1C 5.2 03/17/2023    HGBA1C 5.4 09/23/2020    HGBA1C 5.2 09/19/2019     Lab Results   Component Value Date    CHOL 178 03/17/2023    CHOL 206 (H) 09/23/2020    CHOL 196 09/19/2019     Lab Results   Component Value Date    HDL 72  "03/17/2023    HDL 99 09/23/2020     09/19/2019     Lab Results   Component Value Date    LDLCALC 95 03/17/2023    LDLCALC 98 09/23/2020    LDLCALC 84 09/19/2019     Lab Results   Component Value Date    TRIG 56 03/17/2023    TRIG 51 09/23/2020    TRIG 47 09/19/2019     No results found for: \"CHOLHDL\"  Lab Results   Component Value Date     03/25/2024    K 4.4 03/25/2024     03/25/2024    CO2 24 03/25/2024    BUN 15 03/25/2024    CREATININE 0.73 03/25/2024    GLUCOSE 93 03/25/2024    AST 15 03/17/2023    ALT 9 03/17/2023    ALKPHOS 72 03/17/2023    PROTEIN 6.8 03/17/2023    ALBUMIN 4.1 03/17/2023    BILITOT 0.6 03/17/2023    EGFR 97 03/25/2024    ANIONGAP 6 08/07/2015       PHYSICAL EXAMINATION      Visit Vitals  /74 (BP Location: Left upper arm, Patient Position: Sitting)   Pulse 71   Resp 17   Ht 1.6 m (5' 3\")   Wt 74.8 kg (164 lb 14.4 oz)   LMP 05/15/2022   SpO2 99%   BMI 29.21 kg/m²         Physical Exam  NAD, excess weight noted  Skin intact  Respirations even and non-labored  MS FROM all extremities  Neuro A&O X 3  Psych pleasant and conversant           ASSESSMENT AND PLAN         Impaired fasting glucose  Continue low glycemic nutrition plan and physical activity.      Vitamin D deficiency  Taking vitamin D supplement as recommended.     BMI 22.0-22.9, adult  Progress: Weight change since last visit: 134lbs- 164.9lbs        Weight change since initial appointment: 188.4- 164.9lbs -13% total body weight             Nutrition Goal: Meeting protein goals, goal for water 64oz    Activity Goal: HIT 2 days a week, walking, weight lifting two days a week, 12,000-15,000 steps a day,  schedule with exercise physiologist      Wellness Goal:  Moderate stress, look into calm kaylan    Medication recommendations: Start Wegovy 0.25mg     Other: Labs pending from PCP    Probiotics:  Please buy refrigerated brands with at least 6-7 probiotic strains.   Recommendations are Udo Tg, VSL-3, Florastor, or " Culturelle   Month 1: Wegovy 0.25 mg/0.5 ml, Inject 0.25 mg subcutaneous weekly, quantity 2 mLs /28 days  Month 2: Wegovy 0.5 mg/0.5 ml, Inject 0.5 mg subcutaneous weekly, quantity 2 mLs /28 days  Month 3: Wegovy 1 mg/0.5 ml, Inject 1 mg subcutaneous weekly, quantity 2 mLs /28 days  Month 4: Wegovy 1.7 mg/0.75 ml,  Inject 1.7 mg subcutaneous weekly, quantity 3 mLs /28 days  Month 5: Wegovy 2.4 mg/ 0.74ml, Inject 2.4 mg subcutaneous weekly thereafter, quantity 3 mLs /28 days     Excellent candidate for GLP-1 medication as this patient has tried multiple lifestyle interventions, for 6 or more months, with out sustained weight loss.      We have confirmed that there are no pregnancy plans, no history of pancreatitis, no gastroparesis, and no personal or family history of medullary thyroid tumors for this patient.      Educated on long-term nature of this medication, we do see weight regain with discontinuation of medication.  Successful weight loss with this medication begins at 5% body weight loss, but 1/3 of patients can achieve up to 25% body weight loss with this intervention.      These medications must be used in combination with proper nutrition, regular physical activity, and proper sleep and stress management. Patients are required to undergo nutrition counseling for at least 6 months during initialization and titration of this medication.     Discussed most common side effects of nausea and constipation.  Generally nausea can be helped by having small frequent meals.  Additionally, yashira tea can be helpful in settling the stomach. If constipation occurs, begin with magnesium supplementation: magnesium 250-500 mg nightly.  Fiber supplementation such as psyllium husk can also be used.           I spent 40 minutes on this date of service performing the following activities: obtaining history, entering orders, documenting, preparing for visit, obtaining / reviewing records, providing counseling and education,  independently reviewing study/studies, and coordinating care.     KEMI Uriostegui    Thank you very much for allowing us to participate in the care of your patient. Please do not hesitate to call or email if there are any questions.      Thank you very much for allowing us to participate in the care of your patient. Please do not hesitate to call or email if there are any questions.

## 2024-11-05 ENCOUNTER — OFFICE VISIT (OUTPATIENT)
Dept: BARIATRICS/WEIGHT MGMT | Facility: CLINIC | Age: 54
End: 2024-11-05
Payer: COMMERCIAL

## 2024-11-05 VITALS
DIASTOLIC BLOOD PRESSURE: 74 MMHG | RESPIRATION RATE: 17 BRPM | OXYGEN SATURATION: 99 % | HEART RATE: 71 BPM | WEIGHT: 164.9 LBS | BODY MASS INDEX: 29.22 KG/M2 | HEIGHT: 63 IN | SYSTOLIC BLOOD PRESSURE: 112 MMHG

## 2024-11-05 DIAGNOSIS — E55.9 VITAMIN D DEFICIENCY: ICD-10-CM

## 2024-11-05 DIAGNOSIS — R73.01 IMPAIRED FASTING GLUCOSE: Primary | ICD-10-CM

## 2024-11-05 DIAGNOSIS — E66.3 OVERWEIGHT WITH BODY MASS INDEX (BMI) OF 29 TO 29.9 IN ADULT: ICD-10-CM

## 2024-11-05 PROCEDURE — 3008F BODY MASS INDEX DOCD: CPT | Performed by: NURSE PRACTITIONER

## 2024-11-05 PROCEDURE — 99215 OFFICE O/P EST HI 40 MIN: CPT | Performed by: NURSE PRACTITIONER

## 2024-11-05 NOTE — PATIENT INSTRUCTIONS
Problem List Items Addressed This Visit       Vitamin D deficiency     Taking vitamin D supplement as recommended.          Impaired fasting glucose - Primary     Continue low glycemic nutrition plan and physical activity.           BMI 22.0-22.9, adult     Progress: Weight change since last visit: 134lbs- 164.9lbs        Weight change since initial appointment: 188.4- 164.9lbs -13% total body weight             Nutrition Goal: Meeting protein goals, goal for water 64oz    Activity Goal: HIT 2 days a week, walking, weight lifting two days a week, 12,000-15,000 steps a day,  schedule with exercise physiologist      Wellness Goal:  Moderate stress, look into calm kaylan    Medication recommendations: Start Wegovy 0.25mg     Other: Labs pending from PCP    Probiotics:  Please buy refrigerated brands with at least 6-7 probiotic strains.   Recommendations are Udo Tg, VSL-3, Florastor, or Culturelle   Month 1: Wegovy 0.25 mg/0.5 ml, Inject 0.25 mg subcutaneous weekly, quantity 2 mLs /28 days  Month 2: Wegovy 0.5 mg/0.5 ml, Inject 0.5 mg subcutaneous weekly, quantity 2 mLs /28 days  Month 3: Wegovy 1 mg/0.5 ml, Inject 1 mg subcutaneous weekly, quantity 2 mLs /28 days  Month 4: Wegovy 1.7 mg/0.75 ml,  Inject 1.7 mg subcutaneous weekly, quantity 3 mLs /28 days  Month 5: Wegovy 2.4 mg/ 0.74ml, Inject 2.4 mg subcutaneous weekly thereafter, quantity 3 mLs /28 days     Excellent candidate for GLP-1 medication as this patient has tried multiple lifestyle interventions, for 6 or more months, with out sustained weight loss.      We have confirmed that there are no pregnancy plans, no history of pancreatitis, no gastroparesis, and no personal or family history of medullary thyroid tumors for this patient.      Educated on long-term nature of this medication, we do see weight regain with discontinuation of medication.  Successful weight loss with this medication begins at 5% body weight loss, but 1/3 of patients can achieve up to 25%  body weight loss with this intervention.      These medications must be used in combination with proper nutrition, regular physical activity, and proper sleep and stress management. Patients are required to undergo nutrition counseling for at least 6 months during initialization and titration of this medication.     Discussed most common side effects of nausea and constipation.  Generally nausea can be helped by having small frequent meals.  Additionally, yashira tea can be helpful in settling the stomach. If constipation occurs, begin with magnesium supplementation: magnesium 250-500 mg nightly.  Fiber supplementation such as psyllium husk can also be used.

## 2025-03-27 ENCOUNTER — OFFICE VISIT (OUTPATIENT)
Dept: PRIMARY CARE | Facility: CLINIC | Age: 55
End: 2025-03-27
Payer: COMMERCIAL

## 2025-03-27 VITALS
RESPIRATION RATE: 16 BRPM | OXYGEN SATURATION: 98 % | HEIGHT: 63 IN | TEMPERATURE: 97.8 F | SYSTOLIC BLOOD PRESSURE: 118 MMHG | BODY MASS INDEX: 31.75 KG/M2 | DIASTOLIC BLOOD PRESSURE: 76 MMHG | HEART RATE: 84 BPM | WEIGHT: 179.2 LBS

## 2025-03-27 DIAGNOSIS — Z86.32 HISTORY OF GESTATIONAL DIABETES: ICD-10-CM

## 2025-03-27 DIAGNOSIS — F43.9 STRESS: ICD-10-CM

## 2025-03-27 DIAGNOSIS — N92.6 IRREGULAR MENSES: ICD-10-CM

## 2025-03-27 DIAGNOSIS — R73.01 IMPAIRED FASTING GLUCOSE: ICD-10-CM

## 2025-03-27 DIAGNOSIS — R53.83 OTHER FATIGUE: ICD-10-CM

## 2025-03-27 DIAGNOSIS — E55.9 VITAMIN D DEFICIENCY: ICD-10-CM

## 2025-03-27 DIAGNOSIS — R06.83 SNORES: ICD-10-CM

## 2025-03-27 DIAGNOSIS — E66.811 CLASS 1 OBESITY DUE TO EXCESS CALORIES WITHOUT SERIOUS COMORBIDITY WITH BODY MASS INDEX (BMI) OF 31.0 TO 31.9 IN ADULT: Primary | ICD-10-CM

## 2025-03-27 DIAGNOSIS — K63.5 HYPERPLASTIC POLYP OF DESCENDING COLON: ICD-10-CM

## 2025-03-27 DIAGNOSIS — Z80.3 FAMILY HISTORY OF MALIGNANT NEOPLASM OF BREAST: ICD-10-CM

## 2025-03-27 DIAGNOSIS — E66.09 CLASS 1 OBESITY DUE TO EXCESS CALORIES WITHOUT SERIOUS COMORBIDITY WITH BODY MASS INDEX (BMI) OF 31.0 TO 31.9 IN ADULT: Primary | ICD-10-CM

## 2025-03-27 PROCEDURE — 3008F BODY MASS INDEX DOCD: CPT | Performed by: INTERNAL MEDICINE

## 2025-03-27 PROCEDURE — 99214 OFFICE O/P EST MOD 30 MIN: CPT | Performed by: INTERNAL MEDICINE

## 2025-03-27 ASSESSMENT — ENCOUNTER SYMPTOMS
SLEEP DISTURBANCE: 1
TREMORS: 0
DYSURIA: 0
CONSTIPATION: 0
BRUISES/BLEEDS EASILY: 0
UNEXPECTED WEIGHT CHANGE: 1
ARTHRALGIAS: 1
DIZZINESS: 0
COUGH: 0

## 2025-03-27 NOTE — PROGRESS NOTES
Subjective      Patient ID: Yany Mahoney is a 54 y.o. female.    Weight gain  60 pounds in 5 years      Anxiety    Menapause    Can't focus    Poor sleep        The following have been reviewed and updated as appropriate in this visit:   Tobacco  Allergies  Meds  Problems  Med Hx  Surg Hx  Fam Hx  Soc   Hx      Review of Systems   Constitutional:  Positive for unexpected weight change.   HENT:  Negative for hearing loss.    Eyes:  Negative for visual disturbance.   Respiratory:  Negative for cough.    Cardiovascular:  Negative for chest pain.   Gastrointestinal:  Negative for constipation.   Endocrine: Negative for polyuria.   Genitourinary:  Negative for dysuria.   Musculoskeletal:  Positive for arthralgias.   Skin:  Negative for rash.   Allergic/Immunologic: Negative for food allergies.   Neurological:  Negative for dizziness and tremors.   Hematological:  Does not bruise/bleed easily.   Psychiatric/Behavioral:  Positive for sleep disturbance.        Objective     Physical Exam  Vitals and nursing note reviewed.   Constitutional:       General: She is not in acute distress.  HENT:      Head: Normocephalic.   Eyes:      General: No scleral icterus.  Cardiovascular:      Rate and Rhythm: Normal rate and regular rhythm.   Pulmonary:      Effort: Pulmonary effort is normal.      Breath sounds: Normal breath sounds.   Abdominal:      General: Bowel sounds are normal.      Palpations: Abdomen is soft.   Musculoskeletal:      Cervical back: Neck supple.      Right lower leg: No edema.      Left lower leg: No edema.   Skin:     General: Skin is warm.   Neurological:      Mental Status: She is alert and oriented to person, place, and time.   Psychiatric:         Behavior: Behavior normal.         Thought Content: Thought content normal.         Judgment: Judgment normal.         Assessment/Plan   Problem List Items Addressed This Visit       History of gestational diabetes    Vitamin D deficiency    Other  fatigue    Hyperplastic polyp of descending colon    Colonoscopy 9/2019    Dr Boles one polyp removed      Follow up 5 years         Family history of malignant neoplasm of breast    Irregular menses    See gynecologist         Impaired fasting glucose    Relevant Orders    Hemoglobin A1c    Class 1 obesity due to excess calories without serious comorbidity with body mass index (BMI) of 31.0 to 31.9 in adult - Primary    See Dr Parada      See Functional Medicine         Relevant Orders    Ambulatory referral to [facility]    Cortisol    Lyme Abs Total W/Reflex WB    Sedimentation rate, automated    Lipid panel    CBC and differential    Comprehensive metabolic panel    Hemoglobin A1c    Urinalysis with Reflex Culture    Folate    Ferritin    Vitamin D 25 hydroxy    Vitamin B12    TSH w reflex FT4    Iron and TIBC    High sensitivity CRP    Insulin    Stress    Relevant Orders    Ambulatory referral to Psychology    Snores    Relevant Orders    Home sleep apnea test   I spent 30 minutes on this date of service performing the following activities: obtaining history, performing examination, entering orders, documenting, preparing for visit, obtaining / reviewing records, providing counseling and education, independently reviewing study/studies, and communicating results.

## 2025-03-27 NOTE — PATIENT INSTRUCTIONS
Problem List Items Addressed This Visit       History of gestational diabetes    Vitamin D deficiency    Other fatigue    Hyperplastic polyp of descending colon    Colonoscopy 9/2019    Dr Boles one polyp removed      Follow up 5 years         Family history of malignant neoplasm of breast    Irregular menses    See gynecologist         Impaired fasting glucose    Relevant Orders    Hemoglobin A1c    Class 1 obesity due to excess calories without serious comorbidity with body mass index (BMI) of 31.0 to 31.9 in adult - Primary    See Dr Parada      See Functional Medicine         Relevant Orders    Ambulatory referral to [facility]    Cortisol    Lyme Abs Total W/Reflex WB    Sedimentation rate, automated    Lipid panel    CBC and differential    Comprehensive metabolic panel    Hemoglobin A1c    Urinalysis with Reflex Culture    Folate    Ferritin    Vitamin D 25 hydroxy    Vitamin B12    TSH w reflex FT4    Iron and TIBC    High sensitivity CRP    Insulin    Stress    Relevant Orders    Ambulatory referral to Psychology    Snores    Relevant Orders    Home sleep apnea test        Get vision and hearing tested      Labs to be done      See Dr Parada instead of Dr Hoffmann      See Gyn      See Nimo in my Office      See Dr Faulkner functional medicine

## 2025-03-29 LAB
25(OH)D3+25(OH)D2 SERPL-MCNC: 43.3 NG/ML (ref 30–100)
ALBUMIN SERPL-MCNC: 4.4 G/DL (ref 3.8–4.9)
ALP SERPL-CCNC: 119 IU/L (ref 44–121)
ALT SERPL-CCNC: 18 IU/L (ref 0–32)
AST SERPL-CCNC: 22 IU/L (ref 0–40)
B BURGDOR AB SER IA.MTTT-IMP: ABNORMAL
B BURGDOR IGG SERPL QL IA: NEGATIVE
B BURGDOR IGG+IGM SER QL IA: POSITIVE
B BURGDOR IGM SERPL QL IA: POSITIVE
BASOPHILS # BLD AUTO: 0 X10E3/UL (ref 0–0.2)
BASOPHILS NFR BLD AUTO: 1 %
BILIRUB SERPL-MCNC: 0.6 MG/DL (ref 0–1.2)
BUN SERPL-MCNC: 15 MG/DL (ref 6–24)
BUN/CREAT SERPL: 21 (ref 9–23)
CALCIUM SERPL-MCNC: 9.4 MG/DL (ref 8.7–10.2)
CHLORIDE SERPL-SCNC: 102 MMOL/L (ref 96–106)
CHOLEST SERPL-MCNC: 218 MG/DL (ref 100–199)
CO2 SERPL-SCNC: 24 MMOL/L (ref 20–29)
CORTIS SERPL-MCNC: 12.8 UG/DL (ref 6.2–19.4)
CREAT SERPL-MCNC: 0.73 MG/DL (ref 0.57–1)
CRP SERPL HS-MCNC: 3.07 MG/L (ref 0–3)
EGFRCR SERPLBLD CKD-EPI 2021: 96 ML/MIN/1.73
EOSINOPHIL # BLD AUTO: 0.1 X10E3/UL (ref 0–0.4)
EOSINOPHIL NFR BLD AUTO: 1 %
ERYTHROCYTE [DISTWIDTH] IN BLOOD BY AUTOMATED COUNT: 12.4 % (ref 11.7–15.4)
ERYTHROCYTE [SEDIMENTATION RATE] IN BLOOD BY WESTERGREN METHOD: 11 MM/HR (ref 0–40)
FERRITIN SERPL-MCNC: 77 NG/ML (ref 15–150)
FOLATE SERPL-MCNC: 13.6 NG/ML
GLOBULIN SER CALC-MCNC: 2.6 G/DL (ref 1.5–4.5)
GLUCOSE SERPL-MCNC: 94 MG/DL (ref 70–99)
HBA1C MFR BLD: 5.5 % (ref 4.8–5.6)
HCT VFR BLD AUTO: 45.6 % (ref 34–46.6)
HDLC SERPL-MCNC: 101 MG/DL
HGB BLD-MCNC: 15.3 G/DL (ref 11.1–15.9)
IMM GRANULOCYTES # BLD AUTO: 0 X10E3/UL (ref 0–0.1)
IMM GRANULOCYTES NFR BLD AUTO: 0 %
INSULIN SERPL-ACNC: 4.9 UIU/ML (ref 2.6–24.9)
IRON SATN MFR SERPL: 50 % (ref 15–55)
IRON SERPL-MCNC: 150 UG/DL (ref 27–159)
LDLC SERPL CALC-MCNC: 107 MG/DL (ref 0–99)
LYMPHOCYTES # BLD AUTO: 2.1 X10E3/UL (ref 0.7–3.1)
LYMPHOCYTES NFR BLD AUTO: 37 %
MCH RBC QN AUTO: 28.1 PG (ref 26.6–33)
MCHC RBC AUTO-ENTMCNC: 33.6 G/DL (ref 31.5–35.7)
MCV RBC AUTO: 84 FL (ref 79–97)
MONOCYTES # BLD AUTO: 0.4 X10E3/UL (ref 0.1–0.9)
MONOCYTES NFR BLD AUTO: 7 %
NEUTROPHILS # BLD AUTO: 3.1 X10E3/UL (ref 1.4–7)
NEUTROPHILS NFR BLD AUTO: 54 %
PLATELET # BLD AUTO: 312 X10E3/UL (ref 150–450)
POTASSIUM SERPL-SCNC: 4.3 MMOL/L (ref 3.5–5.2)
PROT SERPL-MCNC: 7 G/DL (ref 6–8.5)
RBC # BLD AUTO: 5.45 X10E6/UL (ref 3.77–5.28)
SODIUM SERPL-SCNC: 141 MMOL/L (ref 134–144)
T4 FREE SERPL-MCNC: 1.47 NG/DL (ref 0.82–1.77)
TIBC SERPL-MCNC: 299 UG/DL (ref 250–450)
TRIGL SERPL-MCNC: 56 MG/DL (ref 0–149)
TSH SERPL DL<=0.005 MIU/L-ACNC: 0.4 UIU/ML (ref 0.45–4.5)
UIBC SERPL-MCNC: 149 UG/DL (ref 131–425)
VIT B12 SERPL-MCNC: 852 PG/ML (ref 232–1245)
VLDLC SERPL CALC-MCNC: 10 MG/DL (ref 5–40)
WBC # BLD AUTO: 5.7 X10E3/UL (ref 3.4–10.8)

## 2025-03-30 LAB
APPEARANCE UR: CLEAR
BACTERIA #/AREA URNS HPF: NORMAL /[HPF]
BACTERIA UR CULT: NO GROWTH
BACTERIA UR CULT: NORMAL
BILIRUB UR QL STRIP: NEGATIVE
CASTS URNS QL MICRO: NORMAL /LPF
COLOR UR: YELLOW
EPI CELLS #/AREA URNS HPF: NORMAL /HPF (ref 0–10)
GLUCOSE UR QL STRIP: NEGATIVE
HGB UR QL STRIP: NEGATIVE
KETONES UR QL STRIP: NEGATIVE
LAB CORP URINALYSIS REFLEX: ABNORMAL
LEUKOCYTE ESTERASE UR QL STRIP: ABNORMAL
MICRO URNS: ABNORMAL
NITRITE UR QL STRIP: NEGATIVE
PH UR STRIP: 6.5 [PH] (ref 5–7.5)
PROT UR QL STRIP: NEGATIVE
RBC #/AREA URNS HPF: NORMAL /HPF (ref 0–2)
SP GR UR STRIP: 1.01 (ref 1–1.03)
UROBILINOGEN UR STRIP-MCNC: 0.2 MG/DL (ref 0.2–1)
WBC #/AREA URNS HPF: NORMAL /HPF (ref 0–5)

## 2025-03-31 ENCOUNTER — TELEPHONE (OUTPATIENT)
Dept: PRIMARY CARE | Facility: CLINIC | Age: 55
End: 2025-03-31

## 2025-04-01 ENCOUNTER — RESULTS FOLLOW-UP (OUTPATIENT)
Dept: PRIMARY CARE | Facility: CLINIC | Age: 55
End: 2025-04-01

## 2025-04-01 RX ORDER — DOXYCYCLINE 100 MG/1
100 CAPSULE ORAL 2 TIMES DAILY
Qty: 42 CAPSULE | Refills: 0 | Status: SHIPPED | OUTPATIENT
Start: 2025-04-01 | End: 2025-04-22

## 2025-04-01 NOTE — PROGRESS NOTES
[phone]  Infusion - [phone]  Imaging - [phone]    Alvaro Rodrigez RN, BSN, OCN  [facility]   Oncology/Hematology Care Coordinator RN  [facility]  [phone]       "HISTORY OF PRESENT ILLNESS        Yany Mahoney is a 54 y.o. female following up on lifestyle management and weight loss as adjunctive treatment strategies for health condition caused by or worsened by overweight and obesity.     Visit history:  CWWP Intake: 22  Weight: 188.4           BF%: 38.6     REE: 1435  Waist: 36    Co morbidities and Obesity Risk Factors: vitamin D deficiency, colon polyps, dyslipidemia, malignant melanoma, kidney stones, anxiety, sleep apnea, seasonal allergies, perimenopause, prolonged eating interval, frequent snacking, after dinner eating, natural sweetener use, emotional eating when bored, irritable, lonely, tired, stressed, sad, celebrating, mindless eating to stay awake, procrastinate, , fast eating, overeating large portions, distracted eating while on computer, driving, working, cooking, reading, TV, disordered eating patterns, always hungry, never full, intense food cravings, high levels of stress with insufficient relaxation, insufficient cardiovascular exercise and resistance training, insufficient sleep time with unrestful sleep.     Medications Discussed or Started: Wegovy  Medications Contraindicated: Topamax given kidney stones     Nutrition Plan: Hunger Hormone Balance Plan     Calories 1200  4 or more high fiber choices  3 low fiber choices  5 Protein choices  4 Fat choices    3/28/2022  Timin Calorie hhb plan  5 protein/ 4 fat/ 3 lfc/ unlimited hfc  I reviewed this in detail and gave her the macro column sheet and paper food journals to use.    Weight related Conditions:  See above    Medication Trials and Contraindications:  Increase Wegovy to 1.0 mg weekly when completed 0.5 mg weekly  To consider adding topamax.  Craves carbs.      Visit Summaries:  CWWP intake: 22  Weight : 186.9         BF%: 38.4%   REE: 1428 WC:34  She lost 1.5 lbs since last visit.      2022  Weight: 83.6 kg (184 lb 6.4 oz)  Height: 162.6 cm (5' 4\")  BMI " "(Calculated): 31.6  % Body Fat: 38 %  REE: 1417     She will increase Wegovy to 1.7 mg daily.     6/17/2022  Weight: 77.7 kg (171 lb 4.8 oz)  Height: 162.6 cm (5' 4\")  BMI (Calculated): 29.4  % Body Fat: 36.6 %  REE: 1358    Meds:  Phentermine increased from 15 mg daily to 30 mg daily for hunger and cravings  Continue Wegovy 2.4 mg weekly    9/12/2022  Weight: 70.3 kg (154 lb 14.4 oz)  Height: 162.6 cm (5' 4\")  BMI (Calculated): 26.6  % Body Fat: 33.1 %  REE: 1278    Meds:   Phentermine 30 mg daily for hunger and cravings  Continue Wegovy 2.4 mg weekly    03/31/23 MECHE: Weight: 60.5 kg (133 lb 4.8 oz)  Height: 162.6 cm (5' 4\")  BMI (Calculated): 22.9  % Body Fat: 27.1 %  REE: 1180  6/28/2023 wt 134 lb; cont. Wegovy 2.4 mg    11/05/24: 164.9 lb: BMI 28.8: Body Fat 35.5%:    Restart Wegovy with weight regain and off since 3/24.  Cont. Metformin  mg daily.     Medication(s) used to support lifestyle modifications:None, previously on Wegovy previously   Taking medications as prescribed:NA  Side effects: No side effects when taking previously   Medication effect: Weight loss  Supplements used to support lifestyle modification:Vitamin D, Omega 3, and Magnesium    Interval history:   Chart reviewed since our last visit.    Reviewed most recent labs and Reviewed Medical Doctor's note    Subjective: She has continued with dietary and lifestyle changes off wegovy but she is regaining weight.          Medications Ordered Prior to Encounter[1]       Bee sting [bee venom protein (honey bee)]           Lab Results   Component Value Date    HGBA1C 5.5 03/28/2025    HGBA1C 5.2 03/17/2023    HGBA1C 5.4 09/23/2020     Lab Results   Component Value Date    CHOL 218 (H) 03/28/2025    CHOL 178 03/17/2023    CHOL 206 (H) 09/23/2020     Lab Results   Component Value Date     03/28/2025    HDL 72 03/17/2023    HDL 99 09/23/2020     Lab Results   Component Value Date    LDLCALC 107 (H) 03/28/2025    LDLCALC 95 03/17/2023    " "LDLCALC 98 09/23/2020     Lab Results   Component Value Date    TRIG 56 03/28/2025    TRIG 56 03/17/2023    TRIG 51 09/23/2020     No results found for: \"CHOLHDL\"  Lab Results   Component Value Date     03/28/2025    K 4.3 03/28/2025     03/28/2025    CO2 24 03/28/2025    BUN 15 03/28/2025    CREATININE 0.73 03/28/2025    GLUCOSE 94 03/28/2025    AST 22 03/28/2025    ALT 18 03/28/2025    ALKPHOS 119 03/28/2025    PROTEIN 7.0 03/28/2025    ALBUMIN 4.4 03/28/2025    BILITOT 0.6 03/28/2025    EGFR 96 03/28/2025    ANIONGAP 6 08/07/2015       PHYSICAL EXAMINATION      Visit Vitals  /74 (BP Location: Left upper arm, Patient Position: Sitting)   Pulse 86   Ht 1.6 m (5' 3\")   Wt 81.5 kg (179 lb 9.6 oz)   LMP 05/15/2022   SpO2 97%   BMI 31.81 kg/m²      Body mass index is 31.81 kg/m².    Physical Exam           ASSESSMENT AND PLAN         Mixed hyperlipidemia  Cholesterol and LDL increased. Educated patient that abnormal cholesterol levels often come from a blend of genetic and environmental factors.  Maximizing nutrition and physical activity is an important adjunct to medical management to address abnormal cholesterol levels and reduce the risk of atherosclerotic disease such as heart attack, stroke, and peripheral arterial disease.  See assessment and plan under obesity for further details of plan for weight management.     Class 1 obesity due to excess calories without serious comorbidity with body mass index (BMI) of 31.0 to 31.9 in adult  Progress: Weight change since last visit: 164lbs-179lbs        Weight change since initial appointment: 188lbs-179lbs- 5% total body weight loss           Nutrition Goal: Meeting protein and water goals, keeping to planned nutrition, schedule with RD    Activity Goal: Tracking steps, gets 10-15,000 steps a day, going to gym for HIT and strength 150min week    Wellness Goal:  Trouble falling and staying asleep Educated patient on and provided patient on ways to " improve sleep as below:  Turn off blue light electronics and avoid all screens 1 hour prior to scheduled bedtime.  Consider relaxation techniques such as journaling, meditation, binaural beats, or gentle stretching prior to sleep.  Try to wake up around the same time every day regardless of the previous night's sleep.    Medication recommendations: Start Zepbound 2.5mg     I sent Zepbound 2.5 mg to your Pharmacy.  Please read all the info below for me to assist you going forward.  If this is not followed and read I can't assist you efficiently. I need to know when you give yourself the last injection for the month (you will receive 4 pens). Message me on the day of your final dose and if you are tolerating it in terms of appetite suppression or side effects and would you like to move to the next dose OR do you need another month to adjust. If these are not answered, I can't send a script in a timely manner.  You need to check a non-fasting lab for kidney function 6 WEEKS AFTER you start this.  You must be seen every three months to continue to receive prescriptions from me.      WHAT YOU SHOULD KNOW ABOUT Zepbound    Zepbound works in multiple ways. It helps the body release insulin when blood sugar is high, remove excess sugar from the blood, stop the liver from making and releasing too much sugar, reducing appetite and slowing down how quickly food leaves the stomach to improve satiety.     If you take birth control pills by mouth, talk to your healthcare provider before you use Zepbound. Birth control pills may not work as well while using Zepbound. Your healthcare provider may recommend another type of birth control for 4 weeks after you start Zepbound and for 4 weeks after each increase in your dose of Zepbound.  I recommend condoms with your birth control pill 4 weeks after you start Zepbound and for 4 weeks after each increase in your dose of Zepbound.      The dosing schedule of Zepbound is as below:  Month  1: 2.5 mg weekly injection  Month 2: 5 mg weekly injection  Month 3: 7.5mg weekly injection  Month 4: 10 mg weekly injection  Month 5: 12.5mg weekly injection  Month 6: 15mg weekly injection     For a video of how to perform the injection visit: [email or web address]    Zepbound comes in a single-dose pen that requires no mixing. There is no need to see or handle the needle. Zepbound must be stored in the refrigerator unless you are traveling. If you are traveling, please refrigerate it as soon as possible when you arrive at your destination. It does not need to be kept on ice when traveling, but please avoid exposing it to extreme heat or cold or direct sunlight.     Zepbound is injected under the skin of your abdomen, thigh, or upper arm. Do not inject into a muscle or vein. Change your injection site with each injection. Do not use the same site for each injection. If you choose to inject in the same area, always use a different spot in that area.    The most common side effects experienced by patients taking Zepbound include nausea, diarrhea, vomiting, constipation, indigestion, stomach pain.     Every persons' experience on Zepbound is different. Some individuals do not need to increase to higher doses to see good benefit, some individuals have side effects at low or mid range doses and need to remain on these doses a few months before they increase to a higher dose.  When you need a new prescription, send   me a message the day you take your final dose telling me what dose you are currently taking, and how you are tolerating that dose in terms of appetite suppression or side effects, so I know which next dosage to send to your pharmacy.     In studies, most nausea, vomiting and diarrhea events occurred while the dose of Zepbound was being increased. These events decreased over time.    In people who have kidney problems, diarrhea, nausea, and vomiting may cause a loss of fluids (dehydration), which may  cause kidney problems to get worse. It is important for you to drink fluids to help reduce your chance of dehydration.    If you develop nausea, you do not want to increase to the next higher dose until nausea has resolved.  Mild nausea is not a reason to stop the medication.  When you need a refill, please contact me to let me know how you are tolerating your current injection dose.  This will allow me  to determine if we increase your dose or repeat the same dose for another month.    If you develop reflux, please start taking a daily Prevacid or Omeprazole 20mg daily 30-60 minutes before first food in the morning and a nightly Pepcid 20mg with dinner or before bed. Both of these medications are over the counter. We will wean you off of both of these medications at a future point, but this should help control your side effects. If it does not, let me know.     If you develop constipation, increase your water intake to at least 64 ounces a day and start a daily a fiber supplement like Metamucil or FiberCon.  You can also use MiraLAX 1 capful daily or dulcosate (colace or dulcolax)  as a stool softener 1-2 tabs twice daily.  If you go 3 days without a bowel movement, please use milk of magnesium 30 ml every 12 hours for 3 doses or until you have a bowel movement.     If you want to change the day of the week you take your dose, make sure there are at least 3 days (72 hours) between doses. If you miss a dose of  Zepbound, take the missed dose as soon as possible within 4 days (96 hours) after the missed dose. If more than 4 days have passed, skip the missed dose and take your next dose on the regularly scheduled day. Do not take 2 doses of Zepbound within 3 days of each other.    Rare but serious side effects include:  You should not take this medication if you are pregnant, nursing, or plan to become pregnant in the next 3 months.  You should not take this medicine if you have history of severe kidney disease,  pancreatitis or diabetic retinopathy.  If you develop severe abdominal pain while taking this medication, please discontinue the medication and contact our office as soon as possible.   In mouse models, this medication induced a rare form of thyroid tumor called a medullary thyroid tumor.  While we have not seen this happen in humans in the past 10 years of using this type of medication, anyone with a family history of this rare thyroid tumor, should not use this medication.  Please let us know if you develop any difficulty swallowing, or voice changes while on this medication as this could be a sign of a problem developing with your thyroid.     Primary insomnia  Turn off blue light electronics and avoid all screens 1 hour prior to scheduled bedtime.  Consider relaxation techniques such as journaling, meditation, binaural beats, or gentle stretching prior to sleep.  Try to wake up around the same time every day regardless of the previous night's sleep.                 Thank you very much for allowing us to participate in the care of your patient. Please do not hesitate to call or email if there are any questions.          [1]   Current Outpatient Medications on File Prior to Visit   Medication Sig Dispense Refill    ascorbic acid (VITAMIN C ORAL) Take by mouth.      cholecalciferol, vitamin D3, 1,000 unit tablet Take by mouth.      doxycycline (MONODOX) 100 mg capsule Take 1 capsule (100 mg total) by mouth 2 (two) times a day for 21 days. 42 capsule 0    green tea leaf extract (GREEN TEA ORAL) daily.      Lactobacillus acidophilus (PROBIOTIC ORAL) daily.      magnesium oxide (MAG-OX) 400 mg (241.3 mg magnesium) tablet Take 400 mg by mouth daily.      multivitamin (THERAGRAN) tablet Take by mouth.      omega-3 fatty acids-fish oil 300-1,000 mg capsule Take by mouth.      ZINC-15 66 mg tablet Take by mouth.       No current facility-administered medications on file prior to visit.

## 2025-04-02 ENCOUNTER — OFFICE VISIT (OUTPATIENT)
Dept: BARIATRICS/WEIGHT MGMT | Facility: CLINIC | Age: 55
End: 2025-04-02
Payer: COMMERCIAL

## 2025-04-02 VITALS
DIASTOLIC BLOOD PRESSURE: 74 MMHG | OXYGEN SATURATION: 97 % | WEIGHT: 179.6 LBS | HEIGHT: 63 IN | BODY MASS INDEX: 31.82 KG/M2 | HEART RATE: 86 BPM | SYSTOLIC BLOOD PRESSURE: 120 MMHG

## 2025-04-02 DIAGNOSIS — E78.2 MIXED HYPERLIPIDEMIA: Primary | ICD-10-CM

## 2025-04-02 DIAGNOSIS — E66.09 CLASS 1 OBESITY DUE TO EXCESS CALORIES WITHOUT SERIOUS COMORBIDITY WITH BODY MASS INDEX (BMI) OF 31.0 TO 31.9 IN ADULT: ICD-10-CM

## 2025-04-02 DIAGNOSIS — E66.811 CLASS 1 OBESITY DUE TO EXCESS CALORIES WITHOUT SERIOUS COMORBIDITY WITH BODY MASS INDEX (BMI) OF 31.0 TO 31.9 IN ADULT: ICD-10-CM

## 2025-04-02 DIAGNOSIS — F51.01 PRIMARY INSOMNIA: ICD-10-CM

## 2025-04-02 PROCEDURE — 3008F BODY MASS INDEX DOCD: CPT | Performed by: NURSE PRACTITIONER

## 2025-04-02 PROCEDURE — 99214 OFFICE O/P EST MOD 30 MIN: CPT | Performed by: NURSE PRACTITIONER

## 2025-04-02 NOTE — PATIENT INSTRUCTIONS
Problem List Items Addressed This Visit       Class 1 obesity due to excess calories without serious comorbidity with body mass index (BMI) of 31.0 to 31.9 in adult    Progress: Weight change since last visit: 164lbs-179lbs        Weight change since initial appointment: 188lbs-179lbs- 5% total body weight loss           Nutrition Goal: Meeting protein and water goals, keeping to planned nutrition, schedule with RD    Activity Goal: Tracking steps, gets 10-15,000 steps a day, going to gym for HIT and strength 150min week    Wellness Goal:  Trouble falling and staying asleep Educated patient on and provided patient on ways to improve sleep as below:  Turn off blue light electronics and avoid all screens 1 hour prior to scheduled bedtime.  Consider relaxation techniques such as journaling, meditation, binaural beats, or gentle stretching prior to sleep.  Try to wake up around the same time every day regardless of the previous night's sleep.    Medication recommendations: Start Zepbound 2.5mg     I sent Zepbound 2.5 mg to your Pharmacy.  Please read all the info below for me to assist you going forward.  If this is not followed and read I can't assist you efficiently. I need to know when you give yourself the last injection for the month (you will receive 4 pens). Message me on the day of your final dose and if you are tolerating it in terms of appetite suppression or side effects and would you like to move to the next dose OR do you need another month to adjust. If these are not answered, I can't send a script in a timely manner.  You need to check a non-fasting lab for kidney function 6 WEEKS AFTER you start this.  You must be seen every three months to continue to receive prescriptions from me.      WHAT YOU SHOULD KNOW ABOUT Zepbound    Zepbound works in multiple ways. It helps the body release insulin when blood sugar is high, remove excess sugar from the blood, stop the liver from making and releasing too much  sugar, reducing appetite and slowing down how quickly food leaves the stomach to improve satiety.     If you take birth control pills by mouth, talk to your healthcare provider before you use Zepbound. Birth control pills may not work as well while using Zepbound. Your healthcare provider may recommend another type of birth control for 4 weeks after you start Zepbound and for 4 weeks after each increase in your dose of Zepbound.  I recommend condoms with your birth control pill 4 weeks after you start Zepbound and for 4 weeks after each increase in your dose of Zepbound.      The dosing schedule of Zepbound is as below:  Month 1: 2.5 mg weekly injection  Month 2: 5 mg weekly injection  Month 3: 7.5mg weekly injection  Month 4: 10 mg weekly injection  Month 5: 12.5mg weekly injection  Month 6: 15mg weekly injection     For a video of how to perform the injection visit: [email or web address]    Zepbound comes in a single-dose pen that requires no mixing. There is no need to see or handle the needle. Zepbound must be stored in the refrigerator unless you are traveling. If you are traveling, please refrigerate it as soon as possible when you arrive at your destination. It does not need to be kept on ice when traveling, but please avoid exposing it to extreme heat or cold or direct sunlight.     Zepbound is injected under the skin of your abdomen, thigh, or upper arm. Do not inject into a muscle or vein. Change your injection site with each injection. Do not use the same site for each injection. If you choose to inject in the same area, always use a different spot in that area.    The most common side effects experienced by patients taking Zepbound include nausea, diarrhea, vomiting, constipation, indigestion, stomach pain.     Every persons' experience on Zepbound is different. Some individuals do not need to increase to higher doses to see good benefit, some individuals have side effects at low or mid range doses  and need to remain on these doses a few months before they increase to a higher dose.  When you need a new prescription, send   me a message the day you take your final dose telling me what dose you are currently taking, and how you are tolerating that dose in terms of appetite suppression or side effects, so I know which next dosage to send to your pharmacy.     In studies, most nausea, vomiting and diarrhea events occurred while the dose of Zepbound was being increased. These events decreased over time.    In people who have kidney problems, diarrhea, nausea, and vomiting may cause a loss of fluids (dehydration), which may cause kidney problems to get worse. It is important for you to drink fluids to help reduce your chance of dehydration.    If you develop nausea, you do not want to increase to the next higher dose until nausea has resolved.  Mild nausea is not a reason to stop the medication.  When you need a refill, please contact me to let me know how you are tolerating your current injection dose.  This will allow me  to determine if we increase your dose or repeat the same dose for another month.    If you develop reflux, please start taking a daily Prevacid or Omeprazole 20mg daily 30-60 minutes before first food in the morning and a nightly Pepcid 20mg with dinner or before bed. Both of these medications are over the counter. We will wean you off of both of these medications at a future point, but this should help control your side effects. If it does not, let me know.     If you develop constipation, increase your water intake to at least 64 ounces a day and start a daily a fiber supplement like Metamucil or FiberCon.  You can also use MiraLAX 1 capful daily or dulcosate (colace or dulcolax)  as a stool softener 1-2 tabs twice daily.  If you go 3 days without a bowel movement, please use milk of magnesium 30 ml every 12 hours for 3 doses or until you have a bowel movement.     If you want to change the  day of the week you take your dose, make sure there are at least 3 days (72 hours) between doses. If you miss a dose of  Zepbound, take the missed dose as soon as possible within 4 days (96 hours) after the missed dose. If more than 4 days have passed, skip the missed dose and take your next dose on the regularly scheduled day. Do not take 2 doses of Zepbound within 3 days of each other.    Rare but serious side effects include:  You should not take this medication if you are pregnant, nursing, or plan to become pregnant in the next 3 months.  You should not take this medicine if you have history of severe kidney disease, pancreatitis or diabetic retinopathy.  If you develop severe abdominal pain while taking this medication, please discontinue the medication and contact our office as soon as possible.   In mouse models, this medication induced a rare form of thyroid tumor called a medullary thyroid tumor.  While we have not seen this happen in humans in the past 10 years of using this type of medication, anyone with a family history of this rare thyroid tumor, should not use this medication.  Please let us know if you develop any difficulty swallowing, or voice changes while on this medication as this could be a sign of a problem developing with your thyroid.          Relevant Medications    tirzepatide, weight loss, (ZEPBOUND) 2.5 mg/0.5 mL subcutaneous PEN injector    Mixed hyperlipidemia - Primary    Cholesterol and LDL increased. Educated patient that abnormal cholesterol levels often come from a blend of genetic and environmental factors.  Maximizing nutrition and physical activity is an important adjunct to medical management to address abnormal cholesterol levels and reduce the risk of atherosclerotic disease such as heart attack, stroke, and peripheral arterial disease.  See assessment and plan under obesity for further details of plan for weight management.          Relevant Medications    tirzepatide,  weight loss, (ZEPBOUND) 2.5 mg/0.5 mL subcutaneous PEN injector    Primary insomnia    Turn off blue light electronics and avoid all screens 1 hour prior to scheduled bedtime.  Consider relaxation techniques such as journaling, meditation, binaural beats, or gentle stretching prior to sleep.  Try to wake up around the same time every day regardless of the previous night's sleep.                    I sent Zepbound 2.5 mg to your Pharmacy.  Please read all the info below for me to assist you going forward.  If this is not followed and read I can't assist you efficiently. I need to know when you give yourself the last injection for the month (you will receive 4 pens). Message me on the day of your final dose and if you are tolerating it in terms of appetite suppression or side effects and would you like to move to the next dose OR do you need another month to adjust. If these are not answered, I can't send a script in a timely manner.  You need to check a non-fasting lab for kidney function 6 WEEKS AFTER you start this.  You must be seen every three months to continue to receive prescriptions from me.      WHAT YOU SHOULD KNOW ABOUT Zepbound    Zepbound works in multiple ways. It helps the body release insulin when blood sugar is high, remove excess sugar from the blood, stop the liver from making and releasing too much sugar, reducing appetite and slowing down how quickly food leaves the stomach to improve satiety.     If you take birth control pills by mouth, talk to your healthcare provider before you use Zepbound. Birth control pills may not work as well while using Zepbound. Your healthcare provider may recommend another type of birth control for 4 weeks after you start Zepbound and for 4 weeks after each increase in your dose of Zepbound.  I recommend condoms with your birth control pill 4 weeks after you start Zepbound and for 4 weeks after each increase in your dose of Zepbound.      The dosing schedule of  Zepbound is as below:  Month 1: 2.5 mg weekly injection  Month 2: 5 mg weekly injection  Month 3: 7.5mg weekly injection  Month 4: 10 mg weekly injection  Month 5: 12.5mg weekly injection  Month 6: 15mg weekly injection     For a video of how to perform the injection visit: [email or web address]    Zepbound comes in a single-dose pen that requires no mixing. There is no need to see or handle the needle. Zepbound must be stored in the refrigerator unless you are traveling. If you are traveling, please refrigerate it as soon as possible when you arrive at your destination. It does not need to be kept on ice when traveling, but please avoid exposing it to extreme heat or cold or direct sunlight.     Zepbound is injected under the skin of your abdomen, thigh, or upper arm. Do not inject into a muscle or vein. Change your injection site with each injection. Do not use the same site for each injection. If you choose to inject in the same area, always use a different spot in that area.    The most common side effects experienced by patients taking Zepbound include nausea, diarrhea, vomiting, constipation, indigestion, stomach pain.     Every persons' experience on Zepbound is different. Some individuals do not need to increase to higher doses to see good benefit, some individuals have side effects at low or mid range doses and need to remain on these doses a few months before they increase to a higher dose.  When you need a new prescription, send   me a message the day you take your final dose telling me what dose you are currently taking, and how you are tolerating that dose in terms of appetite suppression or side effects, so I know which next dosage to send to your pharmacy.     In studies, most nausea, vomiting and diarrhea events occurred while the dose of Zepbound was being increased. These events decreased over time.    In people who have kidney problems, diarrhea, nausea, and vomiting may cause a loss of fluids  (dehydration), which may cause kidney problems to get worse. It is important for you to drink fluids to help reduce your chance of dehydration.    If you develop nausea, you do not want to increase to the next higher dose until nausea has resolved.  Mild nausea is not a reason to stop the medication.  When you need a refill, please contact me to let me know how you are tolerating your current injection dose.  This will allow me  to determine if we increase your dose or repeat the same dose for another month.    If you develop reflux, please start taking a daily Prevacid or Omeprazole 20mg daily 30-60 minutes before first food in the morning and a nightly Pepcid 20mg with dinner or before bed. Both of these medications are over the counter. We will wean you off of both of these medications at a future point, but this should help control your side effects. If it does not, let me know.     If you develop constipation, increase your water intake to at least 64 ounces a day and start a daily a fiber supplement like Metamucil or FiberCon.  You can also use MiraLAX 1 capful daily or dulcosate (colace or dulcolax)  as a stool softener 1-2 tabs twice daily.  If you go 3 days without a bowel movement, please use milk of magnesium 30 ml every 12 hours for 3 doses or until you have a bowel movement.     If you want to change the day of the week you take your dose, make sure there are at least 3 days (72 hours) between doses. If you miss a dose of  Zepbound, take the missed dose as soon as possible within 4 days (96 hours) after the missed dose. If more than 4 days have passed, skip the missed dose and take your next dose on the regularly scheduled day. Do not take 2 doses of Zepbound within 3 days of each other.    Rare but serious side effects include:  You should not take this medication if you are pregnant, nursing, or plan to become pregnant in the next 3 months.  You should not take this medicine if you have history of  severe kidney disease, pancreatitis or diabetic retinopathy.  If you develop severe abdominal pain while taking this medication, please discontinue the medication and contact our office as soon as possible.   In mouse models, this medication induced a rare form of thyroid tumor called a medullary thyroid tumor.  While we have not seen this happen in humans in the past 10 years of using this type of medication, anyone with a family history of this rare thyroid tumor, should not use this medication.  Please let us know if you develop any difficulty swallowing, or voice changes while on this medication as this could be a sign of a problem developing with your thyroid.

## 2025-04-02 NOTE — ASSESSMENT & PLAN NOTE
Turn off blue light electronics and avoid all screens 1 hour prior to scheduled bedtime.  Consider relaxation techniques such as journaling, meditation, binaural beats, or gentle stretching prior to sleep.  Try to wake up around the same time every day regardless of the previous night's sleep.

## 2025-04-02 NOTE — ASSESSMENT & PLAN NOTE
Cholesterol and LDL increased. Educated patient that abnormal cholesterol levels often come from a blend of genetic and environmental factors.  Maximizing nutrition and physical activity is an important adjunct to medical management to address abnormal cholesterol levels and reduce the risk of atherosclerotic disease such as heart attack, stroke, and peripheral arterial disease.  See assessment and plan under obesity for further details of plan for weight management.

## 2025-04-02 NOTE — ASSESSMENT & PLAN NOTE
Progress: Weight change since last visit: 164lbs-179lbs        Weight change since initial appointment: 188lbs-179lbs- 5% total body weight loss           Nutrition Goal: Meeting protein and water goals, keeping to planned nutrition, schedule with RD    Activity Goal: Tracking steps, gets 10-15,000 steps a day, going to gym for HIT and strength 150min week    Wellness Goal:  Trouble falling and staying asleep Educated patient on and provided patient on ways to improve sleep as below:  Turn off blue light electronics and avoid all screens 1 hour prior to scheduled bedtime.  Consider relaxation techniques such as journaling, meditation, binaural beats, or gentle stretching prior to sleep.  Try to wake up around the same time every day regardless of the previous night's sleep.    Medication recommendations: Start Zepbound 2.5mg     I sent Zepbound 2.5 mg to your Pharmacy.  Please read all the info below for me to assist you going forward.  If this is not followed and read I can't assist you efficiently. I need to know when you give yourself the last injection for the month (you will receive 4 pens). Message me on the day of your final dose and if you are tolerating it in terms of appetite suppression or side effects and would you like to move to the next dose OR do you need another month to adjust. If these are not answered, I can't send a script in a timely manner.  You need to check a non-fasting lab for kidney function 6 WEEKS AFTER you start this.  You must be seen every three months to continue to receive prescriptions from me.      WHAT YOU SHOULD KNOW ABOUT Zepbound    Zepbound works in multiple ways. It helps the body release insulin when blood sugar is high, remove excess sugar from the blood, stop the liver from making and releasing too much sugar, reducing appetite and slowing down how quickly food leaves the stomach to improve satiety.     If you take birth control pills by mouth, talk to your healthcare  provider before you use Zepbound. Birth control pills may not work as well while using Zepbound. Your healthcare provider may recommend another type of birth control for 4 weeks after you start Zepbound and for 4 weeks after each increase in your dose of Zepbound.  I recommend condoms with your birth control pill 4 weeks after you start Zepbound and for 4 weeks after each increase in your dose of Zepbound.      The dosing schedule of Zepbound is as below:  Month 1: 2.5 mg weekly injection  Month 2: 5 mg weekly injection  Month 3: 7.5mg weekly injection  Month 4: 10 mg weekly injection  Month 5: 12.5mg weekly injection  Month 6: 15mg weekly injection     For a video of how to perform the injection visit: [email or web address]    Zepbound comes in a single-dose pen that requires no mixing. There is no need to see or handle the needle. Zepbound must be stored in the refrigerator unless you are traveling. If you are traveling, please refrigerate it as soon as possible when you arrive at your destination. It does not need to be kept on ice when traveling, but please avoid exposing it to extreme heat or cold or direct sunlight.     Zepbound is injected under the skin of your abdomen, thigh, or upper arm. Do not inject into a muscle or vein. Change your injection site with each injection. Do not use the same site for each injection. If you choose to inject in the same area, always use a different spot in that area.    The most common side effects experienced by patients taking Zepbound include nausea, diarrhea, vomiting, constipation, indigestion, stomach pain.     Every persons' experience on Zepbound is different. Some individuals do not need to increase to higher doses to see good benefit, some individuals have side effects at low or mid range doses and need to remain on these doses a few months before they increase to a higher dose.  When you need a new prescription, send   me a message the day you take your final  dose telling me what dose you are currently taking, and how you are tolerating that dose in terms of appetite suppression or side effects, so I know which next dosage to send to your pharmacy.     In studies, most nausea, vomiting and diarrhea events occurred while the dose of Zepbound was being increased. These events decreased over time.    In people who have kidney problems, diarrhea, nausea, and vomiting may cause a loss of fluids (dehydration), which may cause kidney problems to get worse. It is important for you to drink fluids to help reduce your chance of dehydration.    If you develop nausea, you do not want to increase to the next higher dose until nausea has resolved.  Mild nausea is not a reason to stop the medication.  When you need a refill, please contact me to let me know how you are tolerating your current injection dose.  This will allow me  to determine if we increase your dose or repeat the same dose for another month.    If you develop reflux, please start taking a daily Prevacid or Omeprazole 20mg daily 30-60 minutes before first food in the morning and a nightly Pepcid 20mg with dinner or before bed. Both of these medications are over the counter. We will wean you off of both of these medications at a future point, but this should help control your side effects. If it does not, let me know.     If you develop constipation, increase your water intake to at least 64 ounces a day and start a daily a fiber supplement like Metamucil or FiberCon.  You can also use MiraLAX 1 capful daily or dulcosate (colace or dulcolax)  as a stool softener 1-2 tabs twice daily.  If you go 3 days without a bowel movement, please use milk of magnesium 30 ml every 12 hours for 3 doses or until you have a bowel movement.     If you want to change the day of the week you take your dose, make sure there are at least 3 days (72 hours) between doses. If you miss a dose of  Zepbound, take the missed dose as soon as possible  within 4 days (96 hours) after the missed dose. If more than 4 days have passed, skip the missed dose and take your next dose on the regularly scheduled day. Do not take 2 doses of Zepbound within 3 days of each other.    Rare but serious side effects include:  You should not take this medication if you are pregnant, nursing, or plan to become pregnant in the next 3 months.  You should not take this medicine if you have history of severe kidney disease, pancreatitis or diabetic retinopathy.  If you develop severe abdominal pain while taking this medication, please discontinue the medication and contact our office as soon as possible.   In mouse models, this medication induced a rare form of thyroid tumor called a medullary thyroid tumor.  While we have not seen this happen in humans in the past 10 years of using this type of medication, anyone with a family history of this rare thyroid tumor, should not use this medication.  Please let us know if you develop any difficulty swallowing, or voice changes while on this medication as this could be a sign of a problem developing with your thyroid.

## 2025-04-07 ENCOUNTER — OFFICE VISIT (OUTPATIENT)
Dept: BEHAVIORAL HEALTH | Facility: CLINIC | Age: 55
End: 2025-04-07
Payer: COMMERCIAL

## 2025-04-07 DIAGNOSIS — F43.23 ADJUSTMENT DISORDER WITH MIXED ANXIETY AND DEPRESSED MOOD: Primary | ICD-10-CM

## 2025-04-07 PROCEDURE — 90791 PSYCH DIAGNOSTIC EVALUATION: CPT | Performed by: SOCIAL WORKER

## 2025-04-07 NOTE — PROGRESS NOTES
Integrated Behavioral Health Outpatient Initial Visit    Visit Type Performed: In-office     Yany presented today for a behavioral health visit.    Clinician confirmed identification of patient by name and birthdate.    Informed Consent/Confidentiality:   Pt was explained the model of primary care behavioral health we provide at [facility], including the model of care, documentation visibility, and confidentiality:    Model of Care: This is a low-intensity model of care (we provide 8-10 visits of cognitive-behavioral therapy), and the patient has the right to other options of behavioral health care that are indicated for more severe conditions (i.e.: Traditional Outpatient psychotherapy, Intensive Outpatient Programs, Partial Hospitalization Programs, and Inpatient services).    Documentation: The psychologist/licensed behavioral health provider collaborates regularly with the pts PCP regarding the pts treatment. The integrated behavioral health progress notes are visible to the physicians and advanced practitioners in the practice where the pt is being seen, social work, [facility] Integrated Behavioral Health (IBH) providers, [facility] ([facility]) for continuity of care if pts choose to pursue medium-term therapy through [facility], in addition to [facility]'s billing and compliance departments, as needed.     The visit diagnosis and appointment/scheduling information is visible to the patient's EPIC chart, to provide continuity of care across the [facility] system. The pt will also have access to view their notes via [employer] (pt portal).    Confidentiality: Also discussed were confidentiality and the limits of confidentiality. Information shared by the patient with the undersigned provider are kept confidential, unless the patient makes an informed written request for to have their information shared with a specific party, or if a  mandates the release of information via a court order. If the patient is at imminent risk  of suicide or homicide healthcare providers (including psychologists and therapists) may need to break confidentiality to ensure the safety of the patient or others (ie: to engage emergency services). If child, elder, or other vulnerable population abuse or neglect is reported, your healthcare providers must follow mandated reporting requirements.    Patient was given the opportunity to ask clarifying questions, and they expressed understanding and consent: Yes      SUBJECTIVE     History of Behavioral Health Treatment  Previous treatment: None.   Previously experienced symptoms of: no additional concerns noted  Other pertinent behavioral health history: none    Social History  Important people in pt's life/Support network: good support system    Lives with: , children (14 & 9)  Hobbies/Interests: research  Additional pertinent historical information includes:   - Hx of work as   - No prior hx of depression or difficulties with executive functioning    Reported Symptoms  PHQ 9:  Over the last 2 weeks, how often have you been bothered by the following problems?  Little interest or pleasure in doing things: 3-->nearly every day  Feeling down, depressed, or hopeless: 3-->nearly every day  Trouble falling or staying asleep, or sleeping too much: 3-->nearly every day  Feeling tired or having little energy: 3-->nearly every day  Poor appetite or overeatin-->several days  Feeling bad about yourself - or that you are a failure or have let yourself or your family down: 2-->more than half the days  Trouble concentrating on things, such as reading the newspaper or watching television: 3-->nearly every day  Moving or speaking so slowly that other people could have noticed. Or the opposite - being so fidgety or restless that you have been moving around a lot more than usual: 2-->more than half the days  Thoughts that you would be better off dead, or of hurting yourself in some way: 0-->not at all  PHQ-9:  Brief Depression Severity Measure Score: 20  How difficult have these problems made it for you to do your work, take care of things at home, or get along with other people?: extremely difficult    STEVAN-7  Generalized Anxiety Disorder 7  Feeling nervous, anxious or on edge: 3-->Nearly every day  Not being able to stop or control worryin-->More than half the days  Worrying too much about different things: 3-->Nearly every day  Trouble relaxin-->More than half the days  Being so restless that it is hard to sit still: 2-->More than half the days  Becoming easily annoyed or irritable: 2-->More than half the days  Feeling afraid as if something awful might happen: 0-->Not at all  GAD7 Total Score: : 14  If you checked off any problems, how difficult have these made it for you to do your work, take care of things at home, or get along with other people?: Extremely difficult    Additional reported symptoms: difficulty with executive functioning (new onset)    OBJECTIVE     Mental Status Exam  Appearance: Well Groomed  Speech: Regular  Psychomotor Functioning: WNL  Eye Contact: WNL  Orientation: Fully oriented  Attention: WNL  Concentration: WNL  Recent Memory: WNL  Remote Memory: WNL  Thought Content: Appropriate  Thought Process: WNL  Insight: Intact  Sleeping: Decreased  Affect: Full Range  Mood: Euthymic (normal)    ASSESSMENT     Psychotropic medications: no known adherence challenges, N/A   Current Outpatient Medications   Medication Sig Dispense Refill    ascorbic acid (VITAMIN C ORAL) Take by mouth.      cholecalciferol, vitamin D3, 1,000 unit tablet Take by mouth.      doxycycline (MONODOX) 100 mg capsule Take 1 capsule (100 mg total) by mouth 2 (two) times a day for 21 days. 42 capsule 0    green tea leaf extract (GREEN TEA ORAL) daily.      Lactobacillus acidophilus (PROBIOTIC ORAL) daily.      magnesium oxide (MAG-OX) 400 mg (241.3 mg magnesium) tablet Take 400 mg by mouth daily.      multivitamin  (THERAGRAN) tablet Take by mouth.      omega-3 fatty acids-fish oil 300-1,000 mg capsule Take by mouth.      tirzepatide, weight loss, (ZEPBOUND) 2.5 mg/0.5 mL subcutaneous PEN injector Inject 0.5 mL (2.5 mg total) under the skin every (seven) 7 days. 2 mL 0    ZINC-15 66 mg tablet Take by mouth.       No current facility-administered medications for this visit.     Suicidal Ideation/Homicidal Ideation Risk Assessment  Risk Factors:  no known  Protective factors: Effective and accessible mental health care , Connectedness to individuals, family, community, and social institutions, and Problem-solving and conflict resolution skills    Suicidal Ideation: Not Present, No intention or plan.  Self Injurious Behavior:  Not Present  Homicidal Ideation: Not Present  Estimate of Current Risk: Minimal risk    Plan for Safety-   N/A:  Risk is assessed to be minimal; therefore, developing a safety plan is not indicated at this time.    Screening measures administered during this visit  PHQ-9: Brief Depression Severity Measure Score: 20  GAD7 Total Score: : 14    ASSESSMENT / IMPRESSIONS  Yany Mahoney seems to be experiencing an adjustment reaction with mixed anxiety and depressed mood, related to recent onset changes in executive functioning. She shared that within the past year she has noticed changes in her cognitive clarity and acuity that have grown very concerning. She feels overwhelmed and frustrated by a lack of explanation for these symptoms, and worried about possible root causes. She shared that she has been able to fall asleep okay, but is now waking almost daily between 2:30-3am and has some difficulty returning to sleep. She shared that she has always been very organized, driven, and successfully accomplishes tasks; these struggles feel foreign and scary. She is especially concerned by her loss of interest in typical routines/activities, and difficulty with activation towards goals/tasks.    PLAN      Goals:  - Increase understanding of current concerns  - Increase strategies for coping with symptoms/improving functioning    Recommendations for treatment: Individual Therapy, 30 minutes  1-2 times monthly    Recommendations for Interventions: Empathic Listening and Validation, Insight Development, and Monitoring of Symptoms    Next visit plan  - Review current concerns & patterns of coping    I spent  60 minutes on this date of service performing the following activities: obtaining history, documenting, and providing counseling and education.

## 2025-04-11 ASSESSMENT — COGNITIVE AND FUNCTIONAL STATUS - GENERAL
AROUSAL LEVEL: ALERT
INSIGHT: INTACT
IMPULSE CONTROL: INTACT
ATTENTION: WNL
CONCENTRATION: WNL
AFFECT: FULL RANGE
MOOD: EUTHYMIC (NORMAL)
THOUGHT_CONTENT: APPROPRIATE
REMOTE MEMORY: WNL
PSYCHOMOTOR FUNCTIONING: WNL
ORIENTATION: FULLY ORIENTED
APPEARANCE: WELL GROOMED
RECENT MEMORY: WNL
SLEEP_WAKE_CYCLE: DECREASED
EYE_CONTACT: WNL
SPEECH: REGULAR
THOUGHT_PROCESS: WNL

## 2025-05-19 ENCOUNTER — HOSPITAL ENCOUNTER (OUTPATIENT)
Dept: SLEEP MEDICINE | Facility: HOSPITAL | Age: 55
Discharge: HOME | End: 2025-05-19
Attending: INTERNAL MEDICINE
Payer: COMMERCIAL

## 2025-05-19 PROCEDURE — G0399 HOME SLEEP TEST/TYPE 3 PORTA: HCPCS

## 2025-05-27 VITALS — WEIGHT: 169 LBS | HEIGHT: 63 IN | BODY MASS INDEX: 29.95 KG/M2

## 2025-06-27 ENCOUNTER — OFFICE VISIT (OUTPATIENT)
Dept: PRIMARY CARE | Facility: CLINIC | Age: 55
End: 2025-06-27
Payer: COMMERCIAL

## 2025-06-27 VITALS
TEMPERATURE: 98.1 F | BODY MASS INDEX: 31.11 KG/M2 | WEIGHT: 175.6 LBS | HEIGHT: 63 IN | SYSTOLIC BLOOD PRESSURE: 108 MMHG | RESPIRATION RATE: 16 BRPM | DIASTOLIC BLOOD PRESSURE: 62 MMHG | OXYGEN SATURATION: 98 % | HEART RATE: 65 BPM

## 2025-06-27 DIAGNOSIS — Z23 NEED FOR TDAP VACCINATION: ICD-10-CM

## 2025-06-27 DIAGNOSIS — E78.2 MIXED HYPERLIPIDEMIA: ICD-10-CM

## 2025-06-27 DIAGNOSIS — Z82.49 FAMILY HISTORY OF CORONARY ARTERY DISEASE: ICD-10-CM

## 2025-06-27 DIAGNOSIS — E55.9 VITAMIN D DEFICIENCY: ICD-10-CM

## 2025-06-27 DIAGNOSIS — E53.8 VITAMIN B12 DEFICIENCY: ICD-10-CM

## 2025-06-27 DIAGNOSIS — N20.0 KIDNEY STONES: ICD-10-CM

## 2025-06-27 DIAGNOSIS — R73.01 IMPAIRED FASTING GLUCOSE: ICD-10-CM

## 2025-06-27 DIAGNOSIS — Z86.32 HISTORY OF GESTATIONAL DIABETES: ICD-10-CM

## 2025-06-27 DIAGNOSIS — F51.01 PRIMARY INSOMNIA: ICD-10-CM

## 2025-06-27 DIAGNOSIS — Z00.00 PREVENTATIVE HEALTH CARE: Primary | ICD-10-CM

## 2025-06-27 DIAGNOSIS — Z85.828 H/O MALIGNANT NEOPLASM OF SKIN: ICD-10-CM

## 2025-06-27 DIAGNOSIS — E66.811 CLASS 1 OBESITY DUE TO EXCESS CALORIES WITHOUT SERIOUS COMORBIDITY WITH BODY MASS INDEX (BMI) OF 31.0 TO 31.9 IN ADULT: ICD-10-CM

## 2025-06-27 DIAGNOSIS — E66.09 CLASS 1 OBESITY DUE TO EXCESS CALORIES WITHOUT SERIOUS COMORBIDITY WITH BODY MASS INDEX (BMI) OF 31.0 TO 31.9 IN ADULT: ICD-10-CM

## 2025-06-27 DIAGNOSIS — K63.5 HYPERPLASTIC POLYP OF DESCENDING COLON: ICD-10-CM

## 2025-06-27 PROCEDURE — 3008F BODY MASS INDEX DOCD: CPT | Performed by: INTERNAL MEDICINE

## 2025-06-27 PROCEDURE — 90715 TDAP VACCINE 7 YRS/> IM: CPT | Performed by: INTERNAL MEDICINE

## 2025-06-27 PROCEDURE — 99396 PREV VISIT EST AGE 40-64: CPT | Mod: 25 | Performed by: INTERNAL MEDICINE

## 2025-06-27 PROCEDURE — 90471 IMMUNIZATION ADMIN: CPT | Performed by: INTERNAL MEDICINE

## 2025-06-27 RX ORDER — SEMAGLUTIDE 1 MG/.5ML
1 INJECTION, SOLUTION SUBCUTANEOUS
COMMUNITY
Start: 2025-06-03

## 2025-06-27 ASSESSMENT — ENCOUNTER SYMPTOMS
HEADACHES: 0
SHORTNESS OF BREATH: 0
SLEEP DISTURBANCE: 0
CHILLS: 0
NERVOUS/ANXIOUS: 0
FEVER: 0
BACK PAIN: 0
SINUS PRESSURE: 0
EYE PAIN: 0
SORE THROAT: 0
LIGHT-HEADEDNESS: 0
APPETITE CHANGE: 0
FATIGUE: 0
WEAKNESS: 0
BLOOD IN STOOL: 0
HEMATURIA: 0
COUGH: 0
CONSTIPATION: 0
ENDOCRINE NEGATIVE: 1
ARTHRALGIAS: 0
DIARRHEA: 0
DIZZINESS: 0
ABDOMINAL PAIN: 0
DYSURIA: 0
BRUISES/BLEEDS EASILY: 0
RHINORRHEA: 0
TROUBLE SWALLOWING: 0
TREMORS: 0

## 2025-06-27 NOTE — PROGRESS NOTES
"Subjective      Patient ID: Yany Mahoney is a 54 y.o. female.    Physical        The following have been reviewed and updated as appropriate in this visit:   Tobacco  Allergies  Meds  Problems  Med Hx  Surg Hx  Fam Hx  Soc   Hx      Review of Systems   Constitutional:  Negative for chills and fever.   HENT:  Negative for hearing loss.    Eyes:  Negative for visual disturbance.   Respiratory:  Negative for cough.    Cardiovascular:  Negative for chest pain.   Gastrointestinal:  Negative for constipation.   Endocrine: Negative for polyuria.   Genitourinary:  Negative for dysuria.   Musculoskeletal:  Negative for arthralgias.   Skin:  Negative for rash.   Allergic/Immunologic: Negative for food allergies.   Psychiatric/Behavioral:  Negative for sleep disturbance.        Objective   Visit Vitals  /62 (BP Location: Left upper arm, Patient Position: Sitting)   Pulse 65   Temp 36.7 °C (98.1 °F) (Temporal)   Resp 16   Ht 1.6 m (5' 3\")   Wt 79.7 kg (175 lb 9.6 oz)   LMP 05/15/2022   SpO2 98%   BMI 31.11 kg/m²         Physical Exam  Constitutional:       General: She is not in acute distress.  HENT:      Head: Normocephalic.   Eyes:      General: No scleral icterus.  Cardiovascular:      Rate and Rhythm: Normal rate and regular rhythm.   Pulmonary:      Effort: Pulmonary effort is normal.      Breath sounds: Normal breath sounds.   Abdominal:      General: Bowel sounds are normal.      Palpations: Abdomen is soft.   Musculoskeletal:      Cervical back: Neck supple.      Right lower leg: No edema.      Left lower leg: No edema.   Skin:     General: Skin is warm.   Neurological:      Mental Status: She is alert and oriented to person, place, and time.   Psychiatric:         Behavior: Behavior normal.         Thought Content: Thought content normal.         Judgment: Judgment normal.         Assessment/Plan   Problem List Items Addressed This Visit       History of gestational diabetes    Vitamin D deficiency "    Relevant Orders    Vitamin D 25 hydroxy    Preventative health care - Primary    Relevant Orders    Lipid panel    CBC and differential    Comprehensive metabolic panel    Hemoglobin A1c    Vitamin D 25 hydroxy    Vitamin B12    H/O malignant neoplasm of skin    Dermatologist minimum of yearly         Hyperplastic polyp of descending colon    Due for colonoscopy         Relevant Orders    Ambulatory referral to Gastroenterology    Impaired fasting glucose    Relevant Orders    Hemoglobin A1c    Class 1 obesity due to excess calories without serious comorbidity with body mass index (BMI) of 31.0 to 31.9 in adult    Tolerating Wegovy         Kidney stones    Avoid tea      Drink at least 1/2 gallon of water per day         Mixed hyperlipidemia    Relevant Orders    Lipid panel    Comprehensive metabolic panel    TSH w reflex FT4    Primary insomnia    Read why we sleep by Marcial Green         Family history of coronary artery disease    Relevant Orders    CT HEART CORONARY ARTERY CALCIUM SCORE WITHOUT IV CONTRAST    Vitamin B12 deficiency    I ordered level         Relevant Orders    Vitamin B12     Other Visit Diagnoses         Need for Tdap vaccination        Relevant Orders    Tdap vaccine greater than or equal to 6yo IM

## 2025-06-27 NOTE — PROGRESS NOTES
"Subjective  Office visit, physical.      Patient ID: Yany Mahoney is a 54 y.o. female.  1970      Yany Mahoney presents in the office today for a physical exam.         The following have been reviewed and updated as appropriate in this visit:        Review of Systems   Constitutional:  Negative for appetite change, fatigue and fever.   HENT:  Negative for congestion, ear pain, hearing loss, rhinorrhea, sinus pressure, sore throat and trouble swallowing.    Eyes:  Negative for pain and visual disturbance.   Respiratory:  Negative for cough and shortness of breath.    Cardiovascular:  Negative for chest pain.   Gastrointestinal:  Negative for abdominal pain, blood in stool, constipation and diarrhea.   Endocrine: Negative.  Negative for polyuria.   Genitourinary:  Negative for dysuria and hematuria.   Musculoskeletal:  Negative for back pain.   Skin: Negative.  Negative for rash.   Neurological:  Negative for dizziness, tremors, weakness, light-headedness and headaches.   Hematological:  Does not bruise/bleed easily.   Psychiatric/Behavioral:  The patient is not nervous/anxious.        Objective     Vitals:    06/27/25 0818   BP: 108/62   BP Location: Left upper arm   Patient Position: Sitting   Pulse: 65   Resp: 16   Temp: 36.7 °C (98.1 °F)   TempSrc: Temporal   SpO2: 98%   Weight: 79.7 kg (175 lb 9.6 oz)   Height: 1.6 m (5' 3\")     Body mass index is 31.11 kg/m².    Physical Exam  Constitutional:       Appearance: Normal appearance.   HENT:      Head: Normocephalic.      Right Ear: Tympanic membrane normal.      Left Ear: Tympanic membrane normal.      Nose: Nose normal.   Eyes:      Pupils: Pupils are equal, round, and reactive to light.   Cardiovascular:      Rate and Rhythm: Normal rate and regular rhythm.      Heart sounds: Normal heart sounds.   Pulmonary:      Effort: Pulmonary effort is normal.      Breath sounds: Normal breath sounds.   Abdominal:      Palpations: Abdomen is soft. "   Musculoskeletal:         General: Normal range of motion.      Cervical back: Normal range of motion.   Skin:     General: Skin is warm.   Neurological:      Mental Status: She is alert. Mental status is at baseline.   Psychiatric:         Mood and Affect: Mood normal.         Behavior: Behavior normal.         Judgment: Judgment normal.         Assessment & Plan            By signing my name below, I, Elin Jones, attest that this documentation has been prepared under the direction and in the presence of Dr. Shady Otero MD.     I, Dr. Shady Otero MD, personally performed the services described in this documentation, as documented by the scribe in my presence, and it is both accurate and complete.

## 2025-06-27 NOTE — PATIENT INSTRUCTIONS
Problem List Items Addressed This Visit       History of gestational diabetes    Vitamin D deficiency    Relevant Orders    Vitamin D 25 hydroxy    Preventative health care - Primary    Relevant Orders    Lipid panel    CBC and differential    Comprehensive metabolic panel    Hemoglobin A1c    Vitamin D 25 hydroxy    Vitamin B12    H/O malignant neoplasm of skin    Dermatologist minimum of yearly         Hyperplastic polyp of descending colon    Due for colonoscopy         Relevant Orders    Ambulatory referral to Gastroenterology    Impaired fasting glucose    Relevant Orders    Hemoglobin A1c    Class 1 obesity due to excess calories without serious comorbidity with body mass index (BMI) of 31.0 to 31.9 in adult    Tolerating Wegovy         Kidney stones    Avoid tea      Drink at least 1/2 gallon of water per day         Mixed hyperlipidemia    Relevant Orders    Lipid panel    Comprehensive metabolic panel    TSH w reflex FT4    Primary insomnia    Read why we sleep by Marcial Green         Family history of coronary artery disease    Relevant Orders    CT HEART CORONARY ARTERY CALCIUM SCORE WITHOUT IV CONTRAST    Vitamin B12 deficiency    I ordered level         Relevant Orders    Vitamin B12     Other Visit Diagnoses         Need for Tdap vaccination        Relevant Orders    Tdap vaccine greater than or equal to 6yo IM             Apps for clean food    [employer]      Books    [employer] People by El Kendall    Outlive by Og Stahl      Foot / Ankle      Moe Carlisle

## (undated) DEVICE — FORCEP COLD RADIAL JAW